# Patient Record
Sex: FEMALE | Race: WHITE | HISPANIC OR LATINO | Employment: FULL TIME | URBAN - METROPOLITAN AREA
[De-identification: names, ages, dates, MRNs, and addresses within clinical notes are randomized per-mention and may not be internally consistent; named-entity substitution may affect disease eponyms.]

---

## 2017-01-05 ENCOUNTER — ALLSCRIPTS OFFICE VISIT (OUTPATIENT)
Dept: OTHER | Facility: OTHER | Age: 37
End: 2017-01-05

## 2017-01-11 ENCOUNTER — ALLSCRIPTS OFFICE VISIT (OUTPATIENT)
Dept: OTHER | Facility: OTHER | Age: 37
End: 2017-01-11

## 2017-03-03 ENCOUNTER — ALLSCRIPTS OFFICE VISIT (OUTPATIENT)
Dept: OTHER | Facility: OTHER | Age: 37
End: 2017-03-03

## 2017-06-06 ENCOUNTER — ALLSCRIPTS OFFICE VISIT (OUTPATIENT)
Dept: OTHER | Facility: OTHER | Age: 37
End: 2017-06-06

## 2017-09-08 ENCOUNTER — ALLSCRIPTS OFFICE VISIT (OUTPATIENT)
Dept: OTHER | Facility: OTHER | Age: 37
End: 2017-09-08

## 2017-09-12 ENCOUNTER — GENERIC CONVERSION - ENCOUNTER (OUTPATIENT)
Dept: OTHER | Facility: OTHER | Age: 37
End: 2017-09-12

## 2017-11-29 ENCOUNTER — ALLSCRIPTS OFFICE VISIT (OUTPATIENT)
Dept: OTHER | Facility: OTHER | Age: 37
End: 2017-11-29

## 2018-01-09 NOTE — RESULT NOTES
Message  I received a message from the device clinic that opti vol crossed the threshold  No answer I will attempt to call [facility] in the future to evalaute for SS of HF        Signatures   Electronically signed by : Ellen Concepcion; Sep 12 2017  4:57PM EST                       (Author)

## 2018-01-12 VITALS
BODY MASS INDEX: 35.88 KG/M2 | SYSTOLIC BLOOD PRESSURE: 104 MMHG | WEIGHT: 195 LBS | HEIGHT: 62 IN | HEART RATE: 72 BPM | DIASTOLIC BLOOD PRESSURE: 68 MMHG

## 2018-01-13 VITALS
HEIGHT: 62 IN | SYSTOLIC BLOOD PRESSURE: 110 MMHG | BODY MASS INDEX: 35.7 KG/M2 | WEIGHT: 194 LBS | DIASTOLIC BLOOD PRESSURE: 68 MMHG

## 2018-01-19 ENCOUNTER — ALLSCRIPTS OFFICE VISIT (OUTPATIENT)
Dept: OTHER | Facility: OTHER | Age: 38
End: 2018-01-19

## 2018-01-20 NOTE — PROGRESS NOTES
Assessment   Assessed    1  Cardiomyopathy (425 4) (I42 9)   2  ICD (implantable cardioverter-defibrillator) in place (V45 02) (Z95 810)   3  Left bundle-branch block (426 3) (I44 7)   4  Hypertension (401 9) (I10)    Plan   Hypertension    · Follow-up visit in 1 year Evaluation and Treatment  Follow-up  Status: Hold For -    Scheduling  Requested for: 63BAG0962   Ordered; For: Hypertension; Ordered By: Duane Pippins Performed:  Due: 80FAR7505  Paroxysmal atrial fibrillation    · EKG/ECG- POC; Status:Complete;   Done: 68JTH3784   Perform: In Office; Due:19Jan2019; Last Updated By:Alyssa Camacho; 1/19/2018 3:14:53 PM;Ordered; For:Paroxysmal atrial fibrillation; Ordered By:Jamal Mattson;    Discussion/Summary   Cardiology Discussion Summary Free Text Note Form [facility]:    All of her assessed cardiac problems are stable  I have reviewed her medications and made no changes  No cardiac testing is ordered  RTO 1 year  Repeat an ECHO at that time  Chief Complaint   Chief Complaint Free Text Note Form: patient at the office for 1 year follow up, denied any symptoms only concern about device is making some alert sounds  History of Present Illness   Cardiology HPI Free Text Note Form [facility]: She has not had any cardiac problems since her last OV  He lost over 50 LBS and is doing great  She denies CP, SOB, palpitations, LE edema  She has a CM and LBBB thought to be a postpartum CM 9 years ago  Her EF is now 50% with a biV pacemaker and medications  Review of Systems   Cardiology Female ROS:         Cardiac: as noted in HPI  Skin: No complaints of nonhealing sores or skin rash  Genitourinary: No complaints of recurrent urinary tract infections, frequent urination at night, difficult urination, blood in urine, kidney stones, loss of bladder control, kidney problems, denies any birth control or hormone replacement, is not post menopausal, not currently pregnant        Psychological: No complaints of feeling depressed, anxiety, panic attacks, or difficulty concentrating  General: No complaints of trouble sleeping, lack of energy, fatigue, appetite changes, weight changes, fever, frequent infections, or night sweats  Respiratory: No complaints of shortness of breath, cough with sputum, or wheezing  HEENT: No complaints of serious problems, hearing problems, nose problems, throat problems, or snoring  Gastrointestinal: No complaints of liver problems, nausea, vomiting, heartburn, constipation, bloody stools, diarrhea, problems swallowing, adbominal pain, or rectal bleeding  Hematologic: No complaints of bleeding disorders, anemia, blood clots, or excessive brusing  Neurological: No complaints of numbness, tingling, dizziness, weakness, seizures, headaches, syncope or fainting, AM fatigue, daytime sleepiness, no witnessed apnea episodes  Musculoskeletal: No complaints of arthritis, back pain, or painfull swelling  ROS Reviewed:    ROS reviewed  Active Problems   Problems    1  Acute bronchitis (466 0) (J20 9)   2  Acute rhinitis (460) (J00)   3  Acute sinusitis (461 9) (J01 90)   4  Anxiety (300 00) (F41 9)   5  Cardiomyopathy (425 4) (I42 9)   6  Chronic systolic congestive heart failure (428 22,428 0) (I50 22)   7  Encounter for gynecological examination without abnormal finding (V72 31) (Z01 419)   8  Encounter for routine gynecological examination (V72 31) (Z01 419)   9  Encounter for routine gynecological examination with Papanicolaou smear of cervix     (V72 31,V76 2) (Z01 419)   10  Hypertension (401 9) (I10)   11  ICD (implantable cardioverter-defibrillator) in place (V45 02) (Z95 810)   12  Left bundle-branch block (426 3) (I44 7)   13  Menorrhagia with regular cycle (626 2) (N92 0)   14  Mitral regurgitation (424 0) (I34 0)   15  Paroxysmal atrial fibrillation (427 31) (I48 0)   16  Preoperative testing (V72 84) (Z01 818)   17   Screening for human papillomavirus (HPV) (V73 81) (Z11 51)    Past Medical History   Problems    1  History of Bunion, unspecified laterality   2  History of candidal vulvovaginitis (V13 29) (Z86 19)   3  History of chest pain (V13 89) (Z87 898)   4  History of herpes simplex infection (V12 09) (Z86 19)   5  History of left bundle branch block (V12 59) (Z86 79)   6  Normal delivery 21   9)  Active Problems And Past Medical History Reviewed: The active problems and past medical history were reviewed and updated today  Surgical History   Problems    1  History of Defibrillation   2  History of Implantable Cardioverter-Defibrillator   3  History of Tubal Ligation  Surgical History Reviewed: The surgical history was reviewed and updated today  Family History   Father    1  Family history of Diabetes Mellitus (V18 0)   2  Family history of Heart Disease (V17 49)   3  Family history of Hyperlipidemia   4  Family history of Hypertension (V17 49)  Family History Reviewed: The family history was reviewed and updated today  Social History   Problems    · Being A Social Drinker   · Daily Coffee Consumption (2  Cups/Day)   · Exercising regularly (more than 90 min/week)   · Marital History - Currently    · Never A Smoker  Social History Reviewed: The social history was reviewed and updated today  The social history was reviewed and is unchanged  Current Meds    1  Carvedilol 25 MG Oral Tablet; Take 1 tablet twice daily; Therapy: 43VAD3820 to ([facility])  Requested for: 69XRZ1800; Last     Rx:28Nov2017 Ordered   2  Lisinopril 5 MG Oral Tablet; Take 1 tablet twice daily; Therapy: 95QWH4591 to ([facility])  Requested for: 17KHN3653; Last     Rx:28Nov2017 Ordered  Medication List Reviewed: The medication list was reviewed and updated today  Allergies   Medication    1   No Known Drug Allergies    Vitals   Vital Signs    Recorded: 23GKX1146 03:05PM   Heart Rate 69   Systolic 126, RUE, Sitting   Diastolic 70, RUE, Sitting   Height 5 ft 1 5 in   Weight 148 lb 3 oz   BMI Calculated 27 55   BSA Calculated 1 67     Physical Exam        Constitutional - General appearance: No acute distress, well appearing and well nourished  Eyes - Conjunctiva and Sclera examination: Conjunctiva pink, sclera anicteric  Neck - Normal, no JVD   Pulmonary - Respiratory effort: No signs of respiratory distress  -- Auscultation of lungs: Clear to auscultation  Cardiovascular - Auscultation of heart: Normal rate and rhythm, normal S1 and S2, no murmurs  -- Pedal pulses: Normal, 2+ bilaterally  -- Examination of extremities for edema and/or varicosities: Normal        Abdomen - Soft  Musculoskeletal - Gait and station: Normal gait  Skin - Skin: Normal without rashes  Skin is warm and well perfused  Neurologic - Speech normal  No focal deficits  Psychiatric - Orientation to person, place, and time: Normal       Results/Data   ECG Report:      Rhythm and rate:  normal sinus rhythm  BiV pacing      Future Appointments      Date/Time Provider Specialty Site   12/04/2018 08:30 AM Cardiology, [address] [facility]   03/06/2018 09:30 AM Cardiology, Device Remote  [address]   02/01/2018 09:40 AM GIULIANO Morejon   Obstetrics/Gynecology [facility] OB     Signatures    Electronically signed by : GIULIANO Mitchell ; Jan 19 2018  3:27PM EST                       (Author)

## 2018-01-23 VITALS
BODY MASS INDEX: 27.27 KG/M2 | WEIGHT: 148.19 LBS | HEART RATE: 69 BPM | DIASTOLIC BLOOD PRESSURE: 70 MMHG | SYSTOLIC BLOOD PRESSURE: 100 MMHG | HEIGHT: 62 IN

## 2018-02-01 ENCOUNTER — OFFICE VISIT (OUTPATIENT)
Dept: OBGYN CLINIC | Facility: CLINIC | Age: 38
End: 2018-02-01
Payer: COMMERCIAL

## 2018-02-01 VITALS
DIASTOLIC BLOOD PRESSURE: 62 MMHG | BODY MASS INDEX: 27.64 KG/M2 | SYSTOLIC BLOOD PRESSURE: 122 MMHG | HEIGHT: 61 IN | WEIGHT: 146.4 LBS

## 2018-02-01 DIAGNOSIS — Z12.31 ENCOUNTER FOR SCREENING MAMMOGRAM FOR MALIGNANT NEOPLASM OF BREAST: ICD-10-CM

## 2018-02-01 DIAGNOSIS — Z01.419 ENCOUNTER FOR GYNECOLOGICAL EXAMINATION (GENERAL) (ROUTINE) WITHOUT ABNORMAL FINDINGS: Primary | ICD-10-CM

## 2018-02-01 PROCEDURE — 87624 HPV HI-RISK TYP POOLED RSLT: CPT | Performed by: OBSTETRICS & GYNECOLOGY

## 2018-02-01 PROCEDURE — G0145 SCR C/V CYTO,THINLAYER,RESCR: HCPCS | Performed by: OBSTETRICS & GYNECOLOGY

## 2018-02-01 PROCEDURE — S0612 ANNUAL GYNECOLOGICAL EXAMINA: HCPCS | Performed by: OBSTETRICS & GYNECOLOGY

## 2018-02-01 RX ORDER — LISINOPRIL 5 MG/1
TABLET ORAL
COMMUNITY
Start: 2017-11-28 | End: 2018-09-10

## 2018-02-01 RX ORDER — LISINOPRIL 5 MG/1
1 TABLET ORAL 2 TIMES DAILY
COMMUNITY
Start: 2011-03-23 | End: 2018-12-01 | Stop reason: SDUPTHER

## 2018-02-01 RX ORDER — CARVEDILOL 25 MG/1
25 TABLET ORAL 2 TIMES DAILY WITH MEALS
COMMUNITY
Start: 2017-11-28 | End: 2018-12-01 | Stop reason: SDUPTHER

## 2018-02-01 NOTE — PROGRESS NOTES
Pepper Hinojosa is a 42 y o  female here for a routine gyn exam,   Current complaints   No     Patient is here for annual gyn exam   She has no new problems  She lost 50 lb she should go back on weight watchers program  She is doing well with her pacemaker/defibrillator  Her menses and cycles are normal       Obstetric History  OB History   No data available       Social History     Social History    Marital status: /Civil Union     Spouse name: N/A    Number of children: N/A    Years of education: N/A     Occupational History    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      Social History Main Topics    Smoking status: Never Smoker    Smokeless tobacco: Never Used    Alcohol use Yes      Comment: social    Drug use: No    Sexual activity: Yes     Partners: Male     Birth control/ protection: Female Sterilization     Other Topics Concern    Not on file     Social History Narrative    No narrative on file     Family History   Problem Relation Age of Onset    Diabetes Father     Hypertension Father     Heart disease Father     Hyperlipidemia Father      Past Medical History:   Diagnosis Date    Bunion 1985    Candidal vulvovaginitis     Chest pain     Herpes simplex     LBBB (left bundle branch block)     Normal delivery     2005 son, 2008 daughter    Pacemaker        Current Outpatient Prescriptions:     carvedilol (COREG) 25 mg tablet, , Disp: , Rfl:     lisinopril (ZESTRIL) 5 mg tablet, , Disp: , Rfl:             Gynecologic History  Patient's last menstrual period was 01/22/2018 (exact date)  Contraception: tubal ligation  Last Pap: 2015  Results were: normal  Last mammogram: never   Results were: na    Obstetric History  OB History   No data available         The following portions of the patient's history were reviewed and updated as appropriate: allergies, current medications, past family history, past medical history, past social history, past surgical history and problem list     Review of Systems  Review of Systems is unremarkable     Objective     /62   Ht 5' 1" (1 549 m)   Wt 66 4 kg (146 lb 6 4 oz)   LMP 01/22/2018 (Exact Date)   Breastfeeding? No   BMI 27 66 kg/m²     General Appearance:    Alert, cooperative, no distress, appears stated age   Head:    Normocephalic, without obvious abnormality, atraumatic                   Neck:   Supple, symmetrical, trachea midline, no adenopathy;     thyroid:  no enlargement/tenderness/nodules;           Lungs:     Clear to auscultation bilaterally, respirations unlabored        Heart:    Regular rate and rhythm, S1 and S2 normal, no murmur, rub   or gallop   Breast Exam:  normal nipple, no masses normal skin   Abdomen:     Soft, non-tender, bowel sounds active all four quadrants,     no masses, no organomegaly     Genitalia      :Vulva: normal  Vagina: normal mucosa  Cervix: normal appearance and thin prep PAP obtained  Uterus: normal size, anteverted  Adnexa: normal adnexa and no mass, fullness, tenderness     Rectal:   not done   Extremities:   Extremities normal, atraumatic, no cyanosis or edema       Skin:   Skin color, texture, turgor normal, no rashes or lesions   Lymph nodes:   Cervical, supraclavicular, and axillary nodes normal             Assessment:  Encounter Diagnoses   Name Primary?  Encounter for screening mammogram for malignant neoplasm of breast     Encounter for gynecological examination (general) (routine) without abnormal findings Yes     Encounter Diagnoses   Name Primary?  Encounter for screening mammogram for malignant neoplasm of breast     Encounter for gynecological examination (general) (routine) without abnormal findings Yes           Normal gynecological evalation and well visit         Plan     Education reviewed: none

## 2018-02-05 LAB — HPV RRNA GENITAL QL NAA+PROBE: NORMAL

## 2018-02-07 LAB
LAB AP GYN PRIMARY INTERPRETATION: NORMAL
LAB AP LMP: NORMAL
Lab: NORMAL

## 2018-02-16 ENCOUNTER — TRANSCRIBE ORDERS (OUTPATIENT)
Dept: LAB | Facility: CLINIC | Age: 38
End: 2018-02-16

## 2018-02-19 ENCOUNTER — HOSPITAL ENCOUNTER (OUTPATIENT)
Dept: RADIOLOGY | Age: 38
Discharge: HOME | End: 2018-02-19
Payer: COMMERCIAL

## 2018-02-19 DIAGNOSIS — Z12.31 ENCOUNTER FOR SCREENING MAMMOGRAM FOR MALIGNANT NEOPLASM OF BREAST: ICD-10-CM

## 2018-02-19 PROCEDURE — 77067 SCR MAMMO BI INCL CAD: CPT

## 2018-03-06 ENCOUNTER — CLINICAL SUPPORT (OUTPATIENT)
Dept: CARDIOLOGY CLINIC | Facility: CLINIC | Age: 38
End: 2018-03-06
Payer: COMMERCIAL

## 2018-03-06 DIAGNOSIS — I44.7 LEFT BUNDLE BRANCH BLOCK: ICD-10-CM

## 2018-03-06 DIAGNOSIS — I42.9 CARDIOMYOPATHY, UNSPECIFIED TYPE (HCC): ICD-10-CM

## 2018-03-06 DIAGNOSIS — Z95.810 AICD (AUTOMATIC CARDIOVERTER/DEFIBRILLATOR) PRESENT: ICD-10-CM

## 2018-03-06 DIAGNOSIS — I50.22 CHRONIC SYSTOLIC CONGESTIVE HEART FAILURE (HCC): Primary | ICD-10-CM

## 2018-03-06 DIAGNOSIS — I47.2 VENTRICULAR TACHYARRHYTHMIA (HCC): ICD-10-CM

## 2018-03-06 PROCEDURE — 93295 DEV INTERROG REMOTE 1/2/MLT: CPT | Performed by: INTERNAL MEDICINE

## 2018-03-06 PROCEDURE — 93296 REM INTERROG EVL PM/IDS: CPT | Performed by: INTERNAL MEDICINE

## 2018-03-06 NOTE — PROGRESS NOTES
ICD CARELINK TRANSMISSION: BATTERY VOLTAGE ADEQUATE (5 9 YRS)  AP<0 1%, BVP>99%  ALL AVAILABLE LEAD PARAMETERS WITHIN NORMAL LIMITS  NO SIGNIFICANT HIGH RATE EPISODES  [address] [facility]  OPTI-VOL FLUID THRESHOLD CROSSED IN DEC & ONGOING  PT IS NOT ON ANY DIURETIC  WILL RECHECK IN 1 MONTH  TASKED NP  NORMAL DEVICE FUNCTION   GV

## 2018-03-07 NOTE — PROGRESS NOTES
"  Discussion/Summary  Normal device function      Results/Data  Results   Cardiac Device Remote [id number] 01:25PM [facility] Lung     Test Name Result Flag Reference   MISCELLANEOUS COMMENT      CARELINK TRANSMISSION: BATTERY VOLTAGE ADEQUATE (7 7 YRS)  AP<0 1%, BVP>99%  NO SIGNIFICANT HIGH RATE EPISODES  OPTI-VOL WITHIN NORMAL LIMITS  ALL AVAILABLE LEAD PARAMETERS WITHIN NORMAL LIMITS  NORMAL DEVICE FUNCTION  GV   Cardiac Electrophysiology Report      [id number]}  pdf   DEVICE TYPE CRT-D       Cardiac Electrophysiology Report [id number] 01:25PM [facility]     Test Name Result Flag Reference   Cardiac Electrophysiology Report      [id number]  pdf     Signatures   Electronically signed by : Francisco Gallardo RN; Feb 29 2016  1:07PM EST                       (Author)    Electronically signed by : Chikis Moses DO; Mar  8 2016  1:04PM EST                       (Author)    "

## 2018-03-07 NOTE — PROGRESS NOTES
"  Discussion/Summary  Normal device function     6 hours paf  kelby notified  Results/Data  Results   Cardiac Device Remote [id number] 12:33AM Luz Maria Alfaro     Test Name Result Flag Reference   MISCELLANEOUS COMMENT (Report)     CARELINK TRANSMISSION: BATTERY VOLTAGE ADEQUATE  (7 5 YRS)  AP 1%  98%  ALL AVAILABLE LEAD PARAMETERS WITHIN NORMAL LIMITS  1 AT/AF EPISODE DETECTED < 7 HOURS  PATIENT IS NOT ON ASA OR ANTICOAGULANTS  DR Patricia Deras  OPTI-VOL WITHIN NORMAL LIMITS  NORMAL DEVICE FUNCTION  ---HEIN   Cardiac Electrophysiology Report      [id number]}  pdf   DEVICE TYPE CRT-D       Cardiac Electrophysiology Report [id number] 12:33AM Luz Maria Alfaro     Test Name Result Flag Reference   Cardiac Electrophysiology Report      [id number]  pdf     Signatures   Electronically signed by : Idalmis Vivar, ; Son  3 2016  3:39PM EST                       (Author)    Electronically signed by : Ivan Reinoso DO; Jun 4 2016  3:37PM EST                       (Author)    "

## 2018-03-07 NOTE — PROGRESS NOTES
"  Discussion/Summary  Normal device function     Brief NSVT  EF retained  on beta blocker  adequate BiV pacing    continue same therapy     Results/Data  Cardiac Device In Clinic [id number] 03:15PM Donnia Osgood     Test Name Result Flag Reference   MISCELLANEOUS COMMENT (Report)     DEVICE INTERROGATED IN THE [place] OFFICE: BATTERY VOLTAGE ADEQUATE  AP 0% BVP 99 8% ( 98 5 VSR PACE 1 3)  1 NSVT NOTED, NO THERAPIES GIVEN  AVAIL EGRAM 1 SECS; APPROX 13 BEATS ~ 198 BPM  PT ON CARVEDILOL & EF 50% (2016)  OPTI-VOL WITHIN NORMAL LIMITS  ALL LEAD PARAMETERS WITHIN NORMAL LIMITS  NO PROGRAMMING CHANGES MADE TO DEVICE PARAMETERS  NORMAL DEVICE FUNCTION  NC   Cardiac Electrophysiology Report      [id number]  pdf   DEVICE TYPE CRT-D       Cardiac Electrophysiology Report [id number] 03:15PM Donnia Osgood     Test Name Result Flag Reference   Cardiac Electrophysiology Report      [id number] pdf     Signatures   Electronically signed by : Aletha Gresham, ; Nov 28 2016 10:27AM EST                       (Author)    Electronically signed by : GIULIANO Joya ; Nov 28 2016 12:52PM EST                       (Author)    "

## 2018-03-07 NOTE — PROGRESS NOTES
"  Discussion/Summary  Normal device function      Results/Data  Cardiac Device Remote [id number] 11:22AM [employer] [facility]     Test Name Result Flag Reference   MISCELLANEOUS COMMENT (Report)     CARELINK TRANSMISSION: BATTERY VOLTAGE ADEQUATE (6 YR)  AP < 0 1% BP 99 8% ( 98 4%+VSRP 1 4%)  ALL AVAILABLE LEAD PARAMETERS WITHIN NORMAL LIMITS  NO SIGNIFICANT HIGH RATE EPISODES  204 V SENSE EPISODES SINCE 6/5/17 (1 5 MIN /D) WITH MARKERS SHOWING STACH  -136 BPM (VSRP MAX = 130 PPM)  OPTI-VOL FLUID THRESHOLD CROSSED ~ 9/1/2017  TASKED TO NP  NORMAL DEVICE FUNCTION  RG   Cardiac Electrophysiology Report      [id number]  pdf   DEVICE TYPE CRT-D       Cardiac Electrophysiology Report [id number] 11:22AM [facility] [employer] [facility]     Test Name Result Flag Reference   Cardiac Electrophysiology Report      [id number]  pdf     Signatures   Electronically signed by : Jarret Ray, ; Sep  8 2017  8:05AM EST                       (Author)    Electronically signed by : GIULIANO Hicks ; Sep  8 2017  9:40AM EST                       (Author)    "

## 2018-03-07 NOTE — PROGRESS NOTES
"  Discussion/Summary  Normal device function     Adequate BiV pacing  sinus tachycardia  Results/Data  Cardiac Device Remote [id number] 10:40AM Sarah Beth Figueroa     Test Name Result Flag Reference   MISCELLANEOUS COMMENT (Report)     CARELINK TRANSMISSION: BATTERY VOLTAGE ADEQUATE (7 2 YRS)  AP<0 1%, BVP>99% (TOTAL -98 5% + VSR PACE-1 3%)  NO SIGNIFICANT HIGH RATE EPISODES  [place]  OPTI-VOL WITHIN NORMAL LIMITS  ALL AVAILABLE LEAD PARAMETERS WITHIN NORMAL LIMITS  NORMAL DEVICE FUNCTION  GV   Cardiac Electrophysiology Report      [id number]  pdf   DEVICE TYPE CRT-D       Cardiac Electrophysiology Report [id number] 10:40AM Sarah Beth Figueroa     Test Name Result Flag Reference   Cardiac Electrophysiology Report      [id number]  pdf     Signatures   Electronically signed by : Jose Santizo RN; Mar  3 2017 12:49PM EST                       (Author)    Electronically signed by : GIULIANO Elias ; Mar 14 2017 10:48AM EST                       (Author)    "

## 2018-03-07 NOTE — PROGRESS NOTES
"  Discussion/Summary  Normal device function      Results/Data  Cardiac Device In Clinic [id number] 01:27PM Suzanna Estrin     Test Name Result Flag Reference   MISCELLANEOUS COMMENT (Report)     DEVICE INTERROGATED IN THE [facility] OFFICE  [id number] VOLTAGE ADEQUATE  (6 1 YRS)  AP <1% BVP 99%  ALL AVAILABLE LEAD PARAMETERS WITHIN NORMAL LIMITS  NO SIGNIFICANT HIGH RATE EPISODES  OPTI-VOL WITHIN NORMAL LIMITS  NO PROGRAMMING CHANGES MADE TO DEVICE PARAMETERS  NORMAL DEVICE FUNCTION  ---HEIN   Cardiac Electrophysiology Report      [id number] [id number]  pdf   DEVICE TYPE CRT-D       Cardiac Electrophysiology Report [id number] 01:27PM Suzanna Estrin     Test Name Result Flag Reference   Cardiac Electrophysiology Report      [id number]  pdf     Signatures   Electronically signed by : Jose Velazquez, ; Nov 29 2017  3:18PM EST                       (Author)    Electronically signed by : GIULIANO Jean Baptiste ; Nov 29 2017  4:21PM EST                       (Author)    "

## 2018-03-07 NOTE — PROGRESS NOTES
"  Discussion/Summary  Normal device function      Results/Data  Cardiac Device Remote [id number] 11:25PM Cam Schwalbe     Test Name Result Flag Reference   MISCELLANEOUS COMMENT (Report)     CARELINK TRANSMISSION: BATTERY STATUS "OK"  AP 0% BVP 99 7% ( 98 4 VSR PACE 1 3)  ALL AVAILABLE LEAD PARAMETERS WITHIN NORMAL LIMITS  1 AT/AF NOTED, 0 2% OF TIME IN  4:44 HRS LONG  NO AC NOTED-HER CHADS-VASC SCORE IS 0  DR ALEXIS MADE AWARE  PT DOES HAVE DX OF PAF  EF 50% (2016) & PT ON CARVEDILOL  [address] OPTI-VOL WITHIN NORMAL LIMITS  NORMAL DEVICE FUNCTION  NC   Cardiac Electrophysiology Report      [id number]  pdf   DEVICE TYPE CRT-D       Cardiac Electrophysiology Report [id number] 11:25PM Cam Schwalbe     Test Name Result Flag Reference   Cardiac Electrophysiology Report      [id number]  pdf     Signatures   Electronically signed by : Jefe Mensah, ; Jun 12 2017 12:57PM EST                       (Author)    Electronically signed by : GIULIANO Monahan ; Jun 12 2017  3:25PM EST                       (Author)    "

## 2018-04-09 ENCOUNTER — CLINICAL SUPPORT (OUTPATIENT)
Dept: CARDIOLOGY CLINIC | Facility: CLINIC | Age: 38
End: 2018-04-09
Payer: COMMERCIAL

## 2018-04-09 DIAGNOSIS — I50.22 CHRONIC SYSTOLIC CONGESTIVE HEART FAILURE (HCC): Primary | ICD-10-CM

## 2018-04-09 DIAGNOSIS — I47.2 VT (VENTRICULAR TACHYCARDIA) (HCC): ICD-10-CM

## 2018-04-09 DIAGNOSIS — Z95.810 PRESENCE OF IMPLANTABLE CARDIOVERTER-DEFIBRILLATOR (ICD): ICD-10-CM

## 2018-04-09 PROCEDURE — 93299 PR REM INTERROG ICPMS/SCRMS <30 D TECH REVIEW: CPT | Performed by: INTERNAL MEDICINE

## 2018-04-09 PROCEDURE — 93297 REM INTERROG DEV EVAL ICPMS: CPT | Performed by: INTERNAL MEDICINE

## 2018-04-09 NOTE — PROGRESS NOTES
CARELINK CRT-D TRANSMISSION - OPTI-VOL ONLY:  OPTI-VOL WITHIN NORMAL LIMITS   BATTERY VOLTAGE ADEQUATE (4 7 YR)   AP <0 1% BP 98 4% (VSRP 1 4%)   ALL LEAD PARAMETERS WITHIN NORMAL LIMITS   NO SIGNIFICANT HIGH RATE EPISODES   103 V SENSE EPISODES FOR ST > VSRP MAX (130 BPM)   NORMAL DEVICE FUNCTION   RG

## 2018-09-10 ENCOUNTER — HOSPITAL ENCOUNTER (EMERGENCY)
Facility: HOSPITAL | Age: 38
Discharge: HOME | End: 2018-09-10
Attending: EMERGENCY MEDICINE
Payer: COMMERCIAL

## 2018-09-10 ENCOUNTER — APPOINTMENT (EMERGENCY)
Dept: RADIOLOGY | Facility: HOSPITAL | Age: 38
End: 2018-09-10
Payer: COMMERCIAL

## 2018-09-10 VITALS
DIASTOLIC BLOOD PRESSURE: 74 MMHG | OXYGEN SATURATION: 100 % | SYSTOLIC BLOOD PRESSURE: 120 MMHG | WEIGHT: 140.43 LBS | BODY MASS INDEX: 26.53 KG/M2 | RESPIRATION RATE: 18 BRPM | TEMPERATURE: 98.7 F | HEART RATE: 67 BPM

## 2018-09-10 DIAGNOSIS — R07.89 CHEST WALL PAIN: Primary | ICD-10-CM

## 2018-09-10 DIAGNOSIS — M79.10 MUSCLE PAIN: ICD-10-CM

## 2018-09-10 LAB
ALBUMIN SERPL BCP-MCNC: 3.7 G/DL (ref 3.5–5)
ALP SERPL-CCNC: 45 U/L (ref 46–116)
ALT SERPL W P-5'-P-CCNC: 27 U/L (ref 12–78)
ANION GAP SERPL CALCULATED.3IONS-SCNC: 7 MMOL/L (ref 4–13)
AST SERPL W P-5'-P-CCNC: 14 U/L (ref 5–45)
ATRIAL RATE: 63 BPM
BASOPHILS # BLD AUTO: 0.03 THOUSANDS/ΜL (ref 0–0.1)
BASOPHILS NFR BLD AUTO: 1 % (ref 0–1)
BILIRUB SERPL-MCNC: 0.2 MG/DL (ref 0.2–1)
BUN SERPL-MCNC: 12 MG/DL (ref 5–25)
CALCIUM SERPL-MCNC: 9 MG/DL (ref 8.3–10.1)
CHLORIDE SERPL-SCNC: 103 MMOL/L (ref 100–108)
CO2 SERPL-SCNC: 26 MMOL/L (ref 21–32)
CREAT SERPL-MCNC: 0.61 MG/DL (ref 0.6–1.3)
DEPRECATED D DIMER PPP: 349 NG/ML (FEU) (ref 0–424)
EOSINOPHIL # BLD AUTO: 0.22 THOUSAND/ΜL (ref 0–0.61)
EOSINOPHIL NFR BLD AUTO: 4 % (ref 0–6)
ERYTHROCYTE [DISTWIDTH] IN BLOOD BY AUTOMATED COUNT: 12.2 % (ref 11.6–15.1)
GFR SERPL CREATININE-BSD FRML MDRD: 114 ML/MIN/1.73SQ M
GLUCOSE SERPL-MCNC: 88 MG/DL (ref 65–140)
HCT VFR BLD AUTO: 35.8 % (ref 34.8–46.1)
HGB BLD-MCNC: 11.7 G/DL (ref 11.5–15.4)
IMM GRANULOCYTES # BLD AUTO: 0.01 THOUSAND/UL (ref 0–0.2)
IMM GRANULOCYTES NFR BLD AUTO: 0 % (ref 0–2)
LYMPHOCYTES # BLD AUTO: 2.44 THOUSANDS/ΜL (ref 0.6–4.47)
LYMPHOCYTES NFR BLD AUTO: 42 % (ref 14–44)
MCH RBC QN AUTO: 28.4 PG (ref 26.8–34.3)
MCHC RBC AUTO-ENTMCNC: 32.7 G/DL (ref 31.4–37.4)
MCV RBC AUTO: 87 FL (ref 82–98)
MONOCYTES # BLD AUTO: 0.68 THOUSAND/ΜL (ref 0.17–1.22)
MONOCYTES NFR BLD AUTO: 12 % (ref 4–12)
NEUTROPHILS # BLD AUTO: 2.48 THOUSANDS/ΜL (ref 1.85–7.62)
NEUTS SEG NFR BLD AUTO: 41 % (ref 43–75)
NRBC BLD AUTO-RTO: 0 /100 WBCS
P AXIS: -60 DEGREES
PLATELET # BLD AUTO: 202 THOUSANDS/UL (ref 149–390)
PMV BLD AUTO: 10.9 FL (ref 8.9–12.7)
POTASSIUM SERPL-SCNC: 3.9 MMOL/L (ref 3.5–5.3)
PR INTERVAL: 162 MS
PROT SERPL-MCNC: 7.2 G/DL (ref 6.4–8.2)
QRS AXIS: -31 DEGREES
QRSD INTERVAL: 96 MS
QT INTERVAL: 394 MS
QTC INTERVAL: 403 MS
RBC # BLD AUTO: 4.12 MILLION/UL (ref 3.81–5.12)
SODIUM SERPL-SCNC: 136 MMOL/L (ref 136–145)
T WAVE AXIS: 60 DEGREES
TROPONIN I SERPL-MCNC: <0.02 NG/ML
VENTRICULAR RATE: 63 BPM
WBC # BLD AUTO: 5.86 THOUSAND/UL (ref 4.31–10.16)

## 2018-09-10 PROCEDURE — 93010 ELECTROCARDIOGRAM REPORT: CPT | Performed by: INTERNAL MEDICINE

## 2018-09-10 PROCEDURE — 85379 FIBRIN DEGRADATION QUANT: CPT | Performed by: EMERGENCY MEDICINE

## 2018-09-10 PROCEDURE — 80053 COMPREHEN METABOLIC PANEL: CPT | Performed by: EMERGENCY MEDICINE

## 2018-09-10 PROCEDURE — 36415 COLL VENOUS BLD VENIPUNCTURE: CPT

## 2018-09-10 PROCEDURE — 85025 COMPLETE CBC W/AUTO DIFF WBC: CPT | Performed by: EMERGENCY MEDICINE

## 2018-09-10 PROCEDURE — 93005 ELECTROCARDIOGRAM TRACING: CPT

## 2018-09-10 PROCEDURE — 99285 EMERGENCY DEPT VISIT HI MDM: CPT

## 2018-09-10 PROCEDURE — 84484 ASSAY OF TROPONIN QUANT: CPT | Performed by: EMERGENCY MEDICINE

## 2018-09-10 PROCEDURE — 71046 X-RAY EXAM CHEST 2 VIEWS: CPT

## 2018-09-10 NOTE — DISCHARGE INSTRUCTIONS
Chest Wall Pain, Ambulatory Care   GENERAL INFORMATION:   Chest wall pain  may be caused by problems with the muscles, cartilage, or bones of the chest wall  Chest wall pain may also be caused by pain that spreads to your chest from another part of your body  The pain may be aching, severe, dull, or sharp  It may come and go, or it may be constant  The pain may be worse when you move in certain ways, breathe deeply, or cough  Seek immediate care for the following symptoms:   · Severe pain    · You have any of the following signs of a heart attack:      ¨ Squeezing, pressure, or pain in your chest that lasts longer than 5 minutes or returns    ¨ Discomfort or pain in your back, neck, jaw, stomach, or arm     ¨ Trouble breathing    ¨ Nausea or vomiting    ¨ Lightheadedness or a sudden cold sweat, especially with chest pain or trouble breathing  Treatment  depends on the cause of your chest wall pain  You may need any of the following:  · NSAIDs  help decrease swelling and pain or fever  This medicine is available with or without a doctor's order  NSAIDs can cause stomach bleeding or kidney problems in certain people  If you take blood thinner medicine, always ask your healthcare provider if NSAIDs are safe for you  Always read the medicine label and follow directions  · Acetaminophen  decreases pain  It is available without a doctor's order  Ask how much to take and how often to take it  Follow directions  Acetaminophen can cause liver damage if not taken correctly  · Apply heat  on your chest for 20 to 30 minutes every 2 hours for as many days as directed  Heat helps decrease pain and muscle spasms  · Apply ice  on your chest for 15 to 20 minutes every hour or as directed  Use an ice pack, or put crushed ice in a plastic bag  Cover it with a towel  Ice helps prevent tissue damage and decreases swelling and pain    Follow up with your healthcare provider as directed:  Write down your questions so you remember to ask them during your visits  CARE AGREEMENT:   You have the right to help plan your care  Learn about your health condition and how it may be treated  Discuss treatment options with your caregivers to decide what care you want to receive  You always have the right to refuse treatment  The above information is an  only  It is not intended as medical advice for individual conditions or treatments  Talk to your doctor, nurse or pharmacist before following any medical regimen to see if it is safe and effective for you  © 2014 [address] is for End User's use only and may not be sold, redistributed or otherwise used for commercial purposes  All illustrations and images included in CareNotes® are the copyrighted property of A D A M , Inc  or [facility]  Musculoskeletal Pain   WHAT YOU NEED TO KNOW:   Muscle pain can be dull, achy, or sharp  You may have pain and tenderness to the touch as well  It can occur anywhere on your body and is often brought on by exercise  Muscle pain may occur from an injury, such as a sprain, tendonitis, or bone fracture  Muscle pain can also be the result of medical conditions, such as polymyositis, fibromyalgia, and connective tissue disorders  DISCHARGE INSTRUCTIONS:   Self care:   · Rest  as directed and avoid activity that causes pain  You may be able to return to normal activity when you can move without pain  Follow directions for rest and activity  You are at risk for injury for 3 weeks after your symptoms go away  · Ice  your painful muscle area to decrease pain and swelling  Use an ice pack, or put ice in a plastic bag and cover it with a towel  Always  put a cloth between the ice and your skin  Apply the ice as often as directed for the first 24 to 48 hours  · Compression  with a splint, brace, or elastic bandage helps decrease pain and swelling  This may be needed for muscle pain in arms or legs   A splint, brace, or bandage will also help protect the painful area when you move around  · Elevate  a painful arm or leg to reduce swelling and pain  Elevate your limb while you are sitting or lying down  Prop a painful leg on pillows to keep it above the level of your heart  Medicines:   · NSAIDs  help decrease swelling and pain or fever  This medicine is available with or without a doctor's order  NSAIDs can cause stomach bleeding or kidney problems in certain people  If you take blood thinner medicine, always ask your healthcare provider if NSAIDs are safe for you  Always read the medicine label and follow directions  · Acetaminophen  is used to decrease pain  It is available without a doctor's order  Ask your healthcare provider how much to take and when to take it  Follow directions  Acetaminophen can cause liver damage if not taken correctly  Do not take more than one medicine that contains acetaminophen unless directed  · Muscle relaxers  help relax your muscles to decrease pain and muscle spasms  · Steroids  may be given to decrease redness, pain, and swelling  · Take your medicine as directed  Contact your healthcare provider if you think your medicine is not helping or if you have side effects  Tell him if you are allergic to any medicine  Keep a list of the medicines, vitamins, and herbs you take  Include the amounts, and when and why you take them  Bring the list or the pill bottles to follow-up visits  Carry your medicine list with you in case of an emergency  Follow up with your healthcare provider as directed: You may need more tests to help healthcare providers find the cause of your muscle pain  You may need physical therapy to learn muscle strengthening exercises  Write down your questions so you remember to ask them during your visits  Contact your healthcare provider if:   · You have a fever  · You have trouble sleeping because of your pain       · Your painful area becomes more tender, red, and warm to the touch  · You have decreased movement of the painful area  · You have questions or concerns about your condition or care  Return to the emergency department if:   · You have increased severe pain when you move the painful muscle area  · You lose feeling in your painful muscle area  · You have new or worse swelling in the painful area  Your skin may feel tight  · You have increased muscle pain or pain that does not improve with treatment  © 2017 [address]  Information is for End User's use only and may not be sold, redistributed or otherwise used for commercial purposes  All illustrations and images included in CareNotes® are the copyrighted property of A D A M , Inc  or [employer]  The above information is an  only  It is not intended as medical advice for individual conditions or treatments  Talk to your doctor, nurse or pharmacist before following any medical regimen to see if it is safe and effective for you

## 2018-09-10 NOTE — ED PROVIDER NOTES
History  Chief Complaint   Patient presents with    Chest Pain     Pt reports right sided chest pain for the last "2-3 days" that "comes and goes " Pt reports the chest pain worsens when she exhales  Pt denies SOB or any radiating pain  Pt has ICD placed      Patient presents to the emergency department with right sided chest pain that began 2 days ago  She states it hurts most with positional change and with exhalation  She denies trauma fall or new activity or injury  She denies back pain component  She denies sob or diaphoresis  She denies left-sided chest pain  She denies jaw arm neck pain  She denies calf pain calf tenderness or history of DVTs or pulmonary embolisms  She denies fever chills or cough  She refuses analgesics at this time  Prior to Admission Medications   Prescriptions Last Dose Informant Patient Reported? Taking?   carvedilol (COREG) 25 mg tablet  Self Yes Yes   lisinopril (ZESTRIL) 5 mg tablet   Yes Yes   Sig: Take 1 tablet by mouth 2 (two) times a day      Facility-Administered Medications: None       Past Medical History:   Diagnosis Date    Bunion 1985    Candidal vulvovaginitis     Cardiomyopathy ([facility] [address] )     Chest pain     Herpes simplex     LBBB (left bundle branch block)     Normal delivery     2005 son, 2008 daughter    Pacemaker        Past Surgical History:   Procedure Laterality Date    CARDIAC DEFIBRILLATOR PLACEMENT      TUBAL LIGATION  2009       Family History   Problem Relation Age of Onset    Diabetes Father     Hypertension Father     Heart disease Father     Hyperlipidemia Father      I have reviewed and agree with the history as documented  Social History   Substance Use Topics    Smoking status: Never Smoker    Smokeless tobacco: Never Used    Alcohol use Yes      Comment: social        Review of Systems   Constitutional: Negative  Negative for activity change, appetite change, chills, diaphoresis, fatigue and fever     HENT: Negative for congestion, drooling, sinus pain, sinus pressure, trouble swallowing and voice change  Eyes: Negative  Negative for photophobia and visual disturbance  Respiratory: Negative for cough, choking, chest tightness, shortness of breath, wheezing and stridor  Cardiovascular: Positive for chest pain  Negative for palpitations and leg swelling  Gastrointestinal: Negative  Negative for abdominal pain, anal bleeding, blood in stool, diarrhea, nausea and vomiting  Endocrine: Negative  Genitourinary: Negative  Negative for difficulty urinating, dysuria, frequency, hematuria and urgency  Musculoskeletal: Negative  Negative for back pain, joint swelling, neck pain and neck stiffness  Skin: Negative  Negative for rash and wound  Allergic/Immunologic: Negative  Neurological: Negative  Negative for dizziness, tremors, seizures, syncope, facial asymmetry, speech difficulty, light-headedness, numbness and headaches  Hematological: Negative  Does not bruise/bleed easily  Psychiatric/Behavioral: Negative  Physical Exam  Physical Exam   Constitutional: She is oriented to person, place, and time  She appears well-developed and well-nourished  Nontoxic appearance  No respiratory distress  Patient looks comfortable sitting upright in the stretcher  Speak in full sentences and answer simple questions appropriately  HENT:   Head: Normocephalic and atraumatic  Right Ear: External ear normal    Left Ear: External ear normal    Mouth/Throat: Oropharynx is clear and moist    Eyes: Conjunctivae and EOM are normal  Pupils are equal, round, and reactive to light  Neck: Normal range of motion  Neck supple  Cardiovascular: Normal rate, regular rhythm, normal heart sounds and intact distal pulses  Pulmonary/Chest: Effort normal and breath sounds normal  No respiratory distress  She has no wheezes  She has no rales  She exhibits tenderness  Normal visible appearance of anterior chest wall  No asymmetry ecchymosis signs of infection  No crepitus or subcutaneous air  Mild right-sided tenderness to palpation  Increased discomfort with twisting of trunk  Abdominal: Soft  Bowel sounds are normal  She exhibits no distension and no mass  There is no tenderness  There is no rebound and no guarding  No hernia  Musculoskeletal: Normal range of motion  She exhibits no edema, tenderness or deformity  Neurological: She is alert and oriented to person, place, and time  She has normal reflexes  She displays normal reflexes  No cranial nerve deficit or sensory deficit  She exhibits normal muscle tone  Coordination normal    Skin: Skin is warm and dry  No rash noted  No erythema  Psychiatric: She has a normal mood and affect  Her behavior is normal  Judgment and thought content normal    Nursing note and vitals reviewed        Vital Signs  ED Triage Vitals   Temperature Pulse Respirations Blood Pressure SpO2   09/10/18 1604 09/10/18 1605 09/10/18 1604 09/10/18 1604 09/10/18 1605   98 7 °F (37 1 °C) 75 18 132/67 97 %      Temp Source Heart Rate Source Patient Position - Orthostatic VS BP Location FiO2 (%)   09/10/18 1604 09/10/18 1604 09/10/18 1604 09/10/18 1604 --   Oral Monitor Sitting Right arm       Pain Score       --                  Vitals:    09/10/18 1604 09/10/18 1605   BP: 132/67    Pulse:  75   Patient Position - Orthostatic VS: Sitting        Visual Acuity      ED Medications  Medications - No data to display    Diagnostic Studies  Results Reviewed     Procedure Component Value Units Date/Time    D-Dimer [[id number]]  (Normal) Collected:  09/10/18 1652    Lab Status:  Final result Specimen:  Blood Updated:  09/10/18 1715     D-Dimer, Quant 349 ng/ml (FEU)     Troponin I [[id number]]  (Normal) Collected:  09/10/18 1624    Lab Status:  Final result Specimen:  Blood from Arm, Right Updated:  09/10/18 1649     Troponin I <0 02 ng/mL     Comprehensive metabolic panel [[id number]]  (Abnormal) Collected: 09/10/18 1624    Lab Status:  Final result Specimen:  Blood from Arm, Right Updated:  09/10/18 1646     Sodium 136 mmol/L      Potassium 3 9 mmol/L      Chloride 103 mmol/L      CO2 26 mmol/L      ANION GAP 7 mmol/L      BUN 12 mg/dL      Creatinine 0 61 mg/dL      Glucose 88 mg/dL      Calcium 9 0 mg/dL      AST 14 U/L      ALT 27 U/L      Alkaline Phosphatase 45 (L) U/L      Total Protein 7 2 g/dL      Albumin 3 7 g/dL      Total Bilirubin 0 20 mg/dL      eGFR 114 ml/min/1 73sq m     Narrative:         National Kidney Disease Education Program recommendations are as follows:  GFR calculation is accurate only with a steady state creatinine  Chronic Kidney disease less than 60 ml/min/1 73 sq  meters  Kidney failure less than 15 ml/min/1 73 sq  meters  CBC and differential [[id number]]  (Abnormal) Collected:  09/10/18 1624    Lab Status:  Final result Specimen:  Blood from Arm, Right Updated:  09/10/18 1631     WBC 5 86 Thousand/uL      RBC 4 12 Million/uL      Hemoglobin 11 7 g/dL      Hematocrit 35 8 %      MCV 87 fL      MCH 28 4 pg      MCHC 32 7 g/dL      RDW 12 2 %      MPV 10 9 fL      Platelets 311 Thousands/uL      nRBC 0 /100 WBCs      Neutrophils Relative 41 (L) %      Immat GRANS % 0 %      Lymphocytes Relative 42 %      Monocytes Relative 12 %      Eosinophils Relative 4 %      Basophils Relative 1 %      Neutrophils Absolute 2 48 Thousands/µL      Immature Grans Absolute 0 01 Thousand/uL      Lymphocytes Absolute 2 44 Thousands/µL      Monocytes Absolute 0 68 Thousand/µL      Eosinophils Absolute 0 22 Thousand/µL      Basophils Absolute 0 03 Thousands/µL                  XR chest 2 views   ED Interpretation by Annia Quintero MD (09/10 1713)   No acute disease  No infiltrates or evidence of pneumothorax                   Procedures  ECG 12 Lead Documentation  Date/Time: 9/10/2018 4:46 PM  Performed by: Toy Saavedra  Authorized by: Toy Saavedra     ECG reviewed by me, the ED Provider: yes Patient location:  ED  Previous ECG:     Previous ECG:  Compared to current    Similarity:  Changes noted  Interpretation:     Interpretation: abnormal    Rate:     ECG rate:  63    ECG rate assessment: normal    Rhythm:     Rhythm: paced    Pacing:     Capture:  Complete    Type of pacing:  Atrial  QRS:     QRS axis:  Left    QRS intervals: Wide  Conduction:     Conduction: normal    ST segments:     ST segments:  Non-specific  T waves:     T waves: non-specific             Phone Contacts  ED Phone Contact    ED Course  ED Course as of Sep 10 1736   Mon Sep 10, 2018   1728 Patient is stable for discharge  I suspect a muscular etiology of her chest wall pain  Discussed pain management and signs and symptoms that would require return to the emergency department  MDM  CritCare Time    Disposition  Final diagnoses:   Chest wall pain   Muscle pain     Time reflects when diagnosis was documented in both MDM as applicable and the Disposition within this note     Time User Action Codes Description Comment    9/10/2018  5:32 PM Karen Arriaza Add [R07 89] Chest wall pain     9/10/2018  5:33 PM Madeline Nolasco Add [M79 1] Muscle pain       ED Disposition     ED Disposition Condition Comment    Discharge  Tara Marquez discharge to home/self care  Condition at discharge: Stable        Follow-up Information     Follow up With Specialties Details Why [facility] physician  Schedule an appointment as soon as possible for a visit            Patient's Medications   Discharge Prescriptions    No medications on file     No discharge procedures on file      ED Provider  Electronically Signed by           Jamari Giron MD  09/10/18 895 Eh Ge MD  09/10/18 [id number]

## 2018-11-07 ENCOUNTER — TELEPHONE (OUTPATIENT)
Dept: CARDIOLOGY CLINIC | Facility: CLINIC | Age: 38
End: 2018-11-07

## 2018-11-07 NOTE — TELEPHONE ENCOUNTER
P/C with c/o intensifying CP located on right side of her chest  Pt stated she was seen in ER previously for same symptoms in September   Advised to return to ED, and will f/u in the am

## 2018-11-12 ENCOUNTER — OFFICE VISIT (OUTPATIENT)
Dept: CARDIOLOGY CLINIC | Facility: CLINIC | Age: 38
End: 2018-11-12
Payer: COMMERCIAL

## 2018-11-12 VITALS
DIASTOLIC BLOOD PRESSURE: 60 MMHG | HEIGHT: 61 IN | SYSTOLIC BLOOD PRESSURE: 82 MMHG | WEIGHT: 143 LBS | HEART RATE: 69 BPM | BODY MASS INDEX: 27 KG/M2

## 2018-11-12 DIAGNOSIS — I42.0 DILATED CARDIOMYOPATHY (HCC): ICD-10-CM

## 2018-11-12 DIAGNOSIS — R07.1 CHEST PAIN ON BREATHING: Primary | ICD-10-CM

## 2018-11-12 DIAGNOSIS — I10 ESSENTIAL HYPERTENSION: ICD-10-CM

## 2018-11-12 DIAGNOSIS — I44.7 LEFT BUNDLE-BRANCH BLOCK: ICD-10-CM

## 2018-11-12 DIAGNOSIS — I50.22 CHRONIC SYSTOLIC CONGESTIVE HEART FAILURE (HCC): ICD-10-CM

## 2018-11-12 PROCEDURE — 93000 ELECTROCARDIOGRAM COMPLETE: CPT | Performed by: INTERNAL MEDICINE

## 2018-11-12 PROCEDURE — 99214 OFFICE O/P EST MOD 30 MIN: CPT | Performed by: INTERNAL MEDICINE

## 2018-11-12 NOTE — PROGRESS NOTES
Cardiology Follow Up    Vivi Fletcher  1980  [id number]  [address] [facility]  [address] [place] [address]  [phone]    1  Chest pain on breathing  POCT ECG   2  Dilated cardiomyopathy ([address] )  Echo complete with contrast if indicated   3  Chronic systolic congestive heart failure ([address] )     4  Left bundle-branch block     5  Essential hypertension           Discussion/Summary:Her symptoms are c/w pleuritic pain / inflammation  I will check an ECHO and have her take Motrin 400 mg BID for the next 10 days  She may need a longer course if the symptoms persist  I have reviewed the rest of her medications and made no changes  RTO 1 year  Interval History: She has been having R sided pleuritic CP since September which she feels is getting worse  The pain is worse with taking a deep breath and slightly worse when lying flat  She was seen in the ER  Troponin was normal  CXR was normal  She is presently taking Tylenol for the pain which helps a little  He has a non ischemic CM with a biV pacemaker  Last ECHO showed an EF of 50%      Patient Active Problem List   Diagnosis    Acute bronchitis    Acute rhinitis    Acute sinusitis    Anxiety    Cardiomyopathy ([address] )    Chronic systolic congestive heart failure ([address] )    Hypertension    Left bundle-branch block    Menorrhagia with regular cycle    Mitral regurgitation    Paroxysmal atrial fibrillation (HCC)     Past Medical History:   Diagnosis Date    Anxiety     Atrial fibrillation (HCC)     paroxysmal    Bunion 1985    Candidal vulvovaginitis     Cardiomyopathy ([address] )     Chest pain     Herpes simplex     Hypertension     LBBB (left bundle branch block)     Normal delivery     2005 son, 2008 daughter   Coty Hernandez Pacemaker      Social History     Social History    Marital status: /Civil Union     Spouse name: N/A    Number of children: N/A    Years of education: N/A     Occupational History    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      Social History Main Topics    Smoking status: Never Smoker    Smokeless tobacco: Never Used    Alcohol use Yes      Comment: social    Drug use: No    Sexual activity: Yes     Partners: Male     Birth control/ protection: Female Sterilization     Other Topics Concern    Not on file     Social History Narrative    No narrative on file      Family History   Problem Relation Age of Onset    Diabetes Father     Hypertension Father     Heart disease Father     Hyperlipidemia Father      Past Surgical History:   Procedure Laterality Date    CARDIAC DEFIBRILLATOR PLACEMENT      TUBAL LIGATION  2009       Current Outpatient Prescriptions:     carvedilol (COREG) 25 mg tablet, Take 25 mg by mouth 2 (two) times a day with meals  , Disp: , Rfl:     lisinopril (ZESTRIL) 5 mg tablet, Take 1 tablet by mouth 2 (two) times a day, Disp: , Rfl:   No Known Allergies  Vitals:    11/12/18 0828   BP: (!) 82/60   BP Location: Left arm   Patient Position: Sitting   Cuff Size: Standard   Pulse: 69   Weight: 64 9 kg (143 lb)   Height: 5' 1" (1 549 m)     Weight (last 2 days)     Date/Time   Weight    11/12/18 0828  64 9 (143)             Blood pressure (!) 82/60, pulse 69, height 5' 1" (1 549 m), weight 64 9 kg (143 lb), not currently breastfeeding , Body mass index is 27 02 kg/m²      Labs:  Admission on 09/10/2018, Discharged on 09/10/2018   Component Date Value    Sodium 09/10/2018 136     Potassium 09/10/2018 3 9     Chloride 09/10/2018 103     CO2 09/10/2018 26     ANION GAP 09/10/2018 7     BUN 09/10/2018 12     Creatinine 09/10/2018 0 61     Glucose 09/10/2018 88     Calcium 09/10/2018 9 0     AST 09/10/2018 14     ALT 09/10/2018 27     Alkaline Phosphatase 09/10/2018 45*    Total Protein 09/10/2018 7 2     Albumin 09/10/2018 3 7     Total Bilirubin 09/10/2018 0 20     eGFR 09/10/2018 114     WBC 09/10/2018 5 86     RBC 09/10/2018 4 12     Hemoglobin 09/10/2018 11 7     Hematocrit 09/10/2018 35 8     MCV 09/10/2018 87     MCH 09/10/2018 28 4     MCHC 09/10/2018 32 7     RDW 09/10/2018 12 2     MPV 09/10/2018 10 9     Platelets 46/37/0764 202     nRBC 09/10/2018 0     Neutrophils Relative 09/10/2018 41*    Immat GRANS % 09/10/2018 0     Lymphocytes Relative 09/10/2018 42     Monocytes Relative 09/10/2018 12     Eosinophils Relative 09/10/2018 4     Basophils Relative 09/10/2018 1     Neutrophils Absolute 09/10/2018 2 48     Immature Grans Absolute 09/10/2018 0 01     Lymphocytes Absolute 09/10/2018 2 44     Monocytes Absolute 09/10/2018 0 68     Eosinophils Absolute 09/10/2018 0 22     Basophils Absolute 09/10/2018 0 03     Troponin I 09/10/2018 <0 02     D-Dimer, Quant 09/10/2018 349     Ventricular Rate 09/10/2018 63     Atrial Rate 09/10/2018 63     CA Interval 09/10/2018 162     QRSD Interval 09/10/2018 96     QT Interval 09/10/2018 394     QTC Interval 09/10/2018 403     P Axis 09/10/2018 -60     QRS [place] 09/10/2018 -31     T Wave Axis 09/10/2018 60      Imaging: No results found  Review of Systems:  Review of Systems   Constitutional: Negative for diaphoresis, fatigue, fever and unexpected weight change  HENT: Negative  Respiratory: Negative for cough, shortness of breath and wheezing  Cardiovascular: Positive for chest pain  Negative for palpitations and leg swelling  Gastrointestinal: Negative for abdominal pain, diarrhea and nausea  Musculoskeletal: Negative for gait problem and myalgias  Skin: Negative for rash  Neurological: Negative for dizziness and numbness  Psychiatric/Behavioral: Negative  Physical Exam:  Physical Exam   Constitutional: She is oriented to person, place, and time  She appears well-developed and well-nourished  HENT:   Head: Normocephalic and atraumatic  Eyes: Pupils are equal, round, and reactive to light  Neck: Normal range of motion  Neck supple  No JVD present  Cardiovascular: Regular rhythm, S1 normal, S2 normal and normal pulses  Pulses:       Carotid pulses are 2+ on the right side, and 2+ on the left side  Pulmonary/Chest: Effort normal and breath sounds normal  She has no wheezes  She has no rales  Abdominal: Soft  Bowel sounds are normal  There is no tenderness  Musculoskeletal: Normal range of motion  She exhibits no edema or tenderness  Neurological: She is alert and oriented to person, place, and time  She has normal reflexes  No cranial nerve deficit  Skin: Skin is warm  Psychiatric: She has a normal mood and affect

## 2018-12-01 DIAGNOSIS — I42.0 DILATED CARDIOMYOPATHY (HCC): Primary | ICD-10-CM

## 2018-12-03 RX ORDER — CARVEDILOL 25 MG/1
TABLET ORAL
Qty: 180 TABLET | Refills: 3 | Status: SHIPPED | OUTPATIENT
Start: 2018-12-03 | End: 2019-11-17 | Stop reason: SDUPTHER

## 2018-12-03 RX ORDER — LISINOPRIL 5 MG/1
TABLET ORAL
Qty: 180 TABLET | Refills: 3 | Status: SHIPPED | OUTPATIENT
Start: 2018-12-03 | End: 2019-11-17 | Stop reason: SDUPTHER

## 2018-12-11 ENCOUNTER — IN-CLINIC DEVICE VISIT (OUTPATIENT)
Dept: CARDIOLOGY CLINIC | Facility: CLINIC | Age: 38
End: 2018-12-11
Payer: COMMERCIAL

## 2018-12-11 ENCOUNTER — HOSPITAL ENCOUNTER (OUTPATIENT)
Dept: NON INVASIVE DIAGNOSTICS | Facility: CLINIC | Age: 38
Discharge: HOME | End: 2018-12-11
Payer: COMMERCIAL

## 2018-12-11 DIAGNOSIS — I47.2 VT (VENTRICULAR TACHYCARDIA) (HCC): Primary | ICD-10-CM

## 2018-12-11 DIAGNOSIS — Z95.810 AICD (AUTOMATIC CARDIOVERTER/DEFIBRILLATOR) PRESENT: ICD-10-CM

## 2018-12-11 DIAGNOSIS — I42.0 DILATED CARDIOMYOPATHY (HCC): ICD-10-CM

## 2018-12-11 PROCEDURE — 93306 TTE W/DOPPLER COMPLETE: CPT

## 2018-12-11 PROCEDURE — 93306 TTE W/DOPPLER COMPLETE: CPT | Performed by: INTERNAL MEDICINE

## 2018-12-11 PROCEDURE — 93283 PRGRMG EVAL IMPLANTABLE DFB: CPT | Performed by: INTERNAL MEDICINE

## 2018-12-13 ENCOUNTER — TELEPHONE (OUTPATIENT)
Dept: CARDIOLOGY CLINIC | Facility: CLINIC | Age: 38
End: 2018-12-13

## 2018-12-13 NOTE — PROGRESS NOTES
Results for orders placed or performed in visit on 12/11/18   Cardiac EP device report    Narrative    CRT-D  DEVICE INTERROGATED IN THE [place] OFFICE: BATTERY VOLTAGE ADEQUATE  AP 0%  98%  VSRP 1 4%  ALL AVAILABLE LEAD PARAMETERS WITHIN NORMAL LIMITS  NO SIGNIFICANT HIGH RATE EPISODES  OPTI-VOL WITHIN NORMAL LIMITS  NO PROGRAMMING CHANGES MADE TO DEVICE PARAMETERS  NORMAL DEVICE FUNCTION   NC

## 2019-01-25 ENCOUNTER — OFFICE VISIT (OUTPATIENT)
Dept: CARDIOLOGY CLINIC | Facility: CLINIC | Age: 39
End: 2019-01-25
Payer: COMMERCIAL

## 2019-01-25 VITALS
DIASTOLIC BLOOD PRESSURE: 58 MMHG | BODY MASS INDEX: 26.62 KG/M2 | SYSTOLIC BLOOD PRESSURE: 100 MMHG | HEIGHT: 61 IN | HEART RATE: 80 BPM | WEIGHT: 141 LBS

## 2019-01-25 DIAGNOSIS — I44.7 LEFT BUNDLE-BRANCH BLOCK: ICD-10-CM

## 2019-01-25 DIAGNOSIS — R07.1 CHEST PAIN ON BREATHING: Primary | ICD-10-CM

## 2019-01-25 DIAGNOSIS — I42.0 DILATED CARDIOMYOPATHY (HCC): ICD-10-CM

## 2019-01-25 DIAGNOSIS — I48.0 PAROXYSMAL ATRIAL FIBRILLATION (HCC): ICD-10-CM

## 2019-01-25 PROCEDURE — 99213 OFFICE O/P EST LOW 20 MIN: CPT | Performed by: INTERNAL MEDICINE

## 2019-01-25 NOTE — PROGRESS NOTES
Cardiology Follow Up    Sindy Scherer  1980  [id number]  [address] [facility] [place]  [address] [address]  [phone]    1  Chest pain on breathing  CTA chest pe study    Basic metabolic panel   2  Dilated cardiomyopathy ([address] )     3  Left bundle-branch block     4  Paroxysmal atrial fibrillation (HCC)           Discussion/Summary: I do not feel that her CP is cardiac related  I will get a CT chest / Pulmonary embolus study to rule of PE  With her previous heart problems, she is anxious about this ongoing pain  I tried to reassure her that her heart condition is very stable  RTO 9 months  Interval History: She continues to have R sided upper chest pain worse with breathing  This has been going on since 9/2018  It is better  She still exercises regularly and it does not limit her activity  Recent ECHO showed an EF of 45% which is basically unchanged for her  She had previous cardiac cath which showed patent coronary arteries      Patient Active Problem List   Diagnosis    Acute bronchitis    Acute rhinitis    Acute sinusitis    Anxiety    Cardiomyopathy [facility] [address] )    Hypertension    Left bundle-branch block    Menorrhagia with regular cycle    Mitral regurgitation    Paroxysmal atrial fibrillation (HCC)     Past Medical History:   Diagnosis Date    Anxiety     Atrial fibrillation (HCC)     paroxysmal    Bunion 1985    Candidal vulvovaginitis     Cardiomyopathy ([address] )     Chest pain     Herpes simplex     Hypertension     LBBB (left bundle branch block)     Normal delivery     2005 son, 2008 daughter   Reshma Mayen Pacemaker      Social History     Social History    Marital status: /Civil Union     Spouse name: N/A    Number of children: N/A    Years of education: N/A     Occupational History    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      Social History Main Topics    Smoking status: Never Smoker    Smokeless tobacco: Never Used    Alcohol use Yes      Comment: social    Drug use: No    Sexual activity: Yes     Partners: Male     Birth control/ protection: Female Sterilization     Other Topics Concern    Not on file     Social History Narrative    No narrative on file      Family History   Problem Relation Age of Onset    Diabetes Father     Hypertension Father     Heart disease Father     Hyperlipidemia Father      Past Surgical History:   Procedure Laterality Date    CARDIAC DEFIBRILLATOR PLACEMENT      TUBAL LIGATION  2009       Current Outpatient Prescriptions:     carvedilol (COREG) 25 mg tablet, TAKE 1 TABLET TWICE DAILY, Disp: 180 tablet, Rfl: 3    lisinopril (ZESTRIL) 5 mg tablet, TAKE 1 TABLET TWICE DAILY, Disp: 180 tablet, Rfl: 3  No Known Allergies  Vitals:    01/25/19 0849   BP: 100/58   BP Location: Left arm   Patient Position: Sitting   Cuff Size: Standard   Pulse: 80   Weight: 64 kg (141 lb)   Height: 5' 1" (1 549 m)     Weight (last 2 days)     Date/Time   Weight    01/25/19 0849  64 (141)             Blood pressure 100/58, pulse 80, height 5' 1" (1 549 m), weight 64 kg (141 lb), not currently breastfeeding , Body mass index is 26 64 kg/m²      Labs:  Office Visit on 11/12/2018   Component Date Value    INTERPRETATIONS 11/12/2018     Admission on 09/10/2018, Discharged on 09/10/2018   Component Date Value    Sodium 09/10/2018 136     Potassium 09/10/2018 3 9     Chloride 09/10/2018 103     CO2 09/10/2018 26     ANION GAP 09/10/2018 7     BUN 09/10/2018 12     Creatinine 09/10/2018 0 61     Glucose 09/10/2018 88     Calcium 09/10/2018 9 0     AST 09/10/2018 14     ALT 09/10/2018 27     Alkaline Phosphatase 09/10/2018 45*    Total Protein 09/10/2018 7 2     Albumin 09/10/2018 3 7     Total Bilirubin 09/10/2018 0 20     eGFR 09/10/2018 114     WBC 09/10/2018 5 86     RBC 09/10/2018 4 12     Hemoglobin 09/10/2018 11 7     Hematocrit 09/10/2018 35 8     MCV 09/10/2018 87     MCH 09/10/2018 28 4     MCHC 09/10/2018 32 7     RDW 09/10/2018 12 2     MPV 09/10/2018 10 9     Platelets 22/71/1811 202     nRBC 09/10/2018 0     Neutrophils Relative 09/10/2018 41*    Immat GRANS % 09/10/2018 0     Lymphocytes Relative 09/10/2018 42     Monocytes Relative 09/10/2018 12     Eosinophils Relative 09/10/2018 4     Basophils Relative 09/10/2018 1     Neutrophils Absolute 09/10/2018 2 48     Immature Grans Absolute 09/10/2018 0 01     Lymphocytes Absolute 09/10/2018 2 44     Monocytes Absolute 09/10/2018 0 68     Eosinophils Absolute 09/10/2018 0 22     Basophils Absolute 09/10/2018 0 03     Troponin I 09/10/2018 <0 02     D-Dimer, Quant 09/10/2018 349     Ventricular Rate 09/10/2018 63     Atrial Rate 09/10/2018 63     LA Interval 09/10/2018 162     QRSD Interval 09/10/2018 96     QT Interval 09/10/2018 394     QTC Interval 09/10/2018 403     P Axis 09/10/2018 -60     QRS Apex 09/10/2018 -31     T Wave Axis 09/10/2018 60      Imaging: No results found  Review of Systems:  Review of Systems   Constitutional: Negative for diaphoresis, fatigue, fever and unexpected weight change  HENT: Negative  Respiratory: Negative for cough, shortness of breath and wheezing  Cardiovascular: Positive for chest pain  Negative for palpitations and leg swelling  Gastrointestinal: Negative for abdominal pain, diarrhea and nausea  Musculoskeletal: Negative for gait problem and myalgias  Skin: Negative for rash  Neurological: Negative for dizziness and numbness  Psychiatric/Behavioral: Negative  Physical Exam:  Physical Exam   Constitutional: She is oriented to person, place, and time  She appears well-developed and well-nourished  HENT:   Head: Normocephalic and atraumatic  Eyes: Pupils are equal, round, and reactive to light  Neck: Normal range of motion  Neck supple  No JVD present     Cardiovascular: Regular rhythm, S1 normal, S2 normal and normal pulses  Pulses:       Carotid pulses are 2+ on the right side, and 2+ on the left side  Pulmonary/Chest: Effort normal and breath sounds normal  She has no wheezes  She has no rales  Abdominal: Soft  Bowel sounds are normal  There is no tenderness  Musculoskeletal: Normal range of motion  She exhibits no edema or tenderness  Neurological: She is alert and oriented to person, place, and time  She has normal reflexes  No cranial nerve deficit  Skin: Skin is warm  Psychiatric: She has a normal mood and affect

## 2019-01-26 ENCOUNTER — TRANSCRIBE ORDERS (OUTPATIENT)
Dept: ADMINISTRATIVE | Age: 39
End: 2019-01-26

## 2019-01-26 ENCOUNTER — APPOINTMENT (OUTPATIENT)
Dept: LAB | Age: 39
End: 2019-01-26
Payer: COMMERCIAL

## 2019-01-26 DIAGNOSIS — R07.1 PAINFUL RESPIRATION: ICD-10-CM

## 2019-01-26 DIAGNOSIS — R07.1 PAINFUL RESPIRATION: Primary | ICD-10-CM

## 2019-01-26 LAB
ANION GAP SERPL CALCULATED.3IONS-SCNC: 9 MMOL/L (ref 4–13)
BUN SERPL-MCNC: 24 MG/DL (ref 5–25)
CALCIUM SERPL-MCNC: 8.5 MG/DL (ref 8.3–10.1)
CHLORIDE SERPL-SCNC: 106 MMOL/L (ref 100–108)
CO2 SERPL-SCNC: 24 MMOL/L (ref 21–32)
CREAT SERPL-MCNC: 0.6 MG/DL (ref 0.6–1.3)
GFR SERPL CREATININE-BSD FRML MDRD: 114 ML/MIN/1.73SQ M
GLUCOSE P FAST SERPL-MCNC: 104 MG/DL (ref 65–99)
POTASSIUM SERPL-SCNC: 4.1 MMOL/L (ref 3.5–5.3)
SODIUM SERPL-SCNC: 139 MMOL/L (ref 136–145)

## 2019-01-26 PROCEDURE — 36415 COLL VENOUS BLD VENIPUNCTURE: CPT | Performed by: INTERNAL MEDICINE

## 2019-01-26 PROCEDURE — 80048 BASIC METABOLIC PNL TOTAL CA: CPT | Performed by: INTERNAL MEDICINE

## 2019-01-31 ENCOUNTER — TELEPHONE (OUTPATIENT)
Dept: CARDIOLOGY CLINIC | Facility: CLINIC | Age: 39
End: 2019-01-31

## 2019-01-31 ENCOUNTER — HOSPITAL ENCOUNTER (OUTPATIENT)
Dept: CT IMAGING | Facility: HOSPITAL | Age: 39
Discharge: HOME | End: 2019-01-31
Attending: INTERNAL MEDICINE
Payer: COMMERCIAL

## 2019-01-31 DIAGNOSIS — R07.1 CHEST PAIN ON BREATHING: ICD-10-CM

## 2019-01-31 PROCEDURE — 71275 CT ANGIOGRAPHY CHEST: CPT

## 2019-01-31 RX ADMIN — IOHEXOL 85 ML: 350 INJECTION, SOLUTION INTRAVENOUS at 17:03

## 2019-01-31 NOTE — TELEPHONE ENCOUNTER
Received call from radiology stating there was a critical result from pts CTA chest from today   Please Review

## 2019-02-05 ENCOUNTER — ANNUAL EXAM (OUTPATIENT)
Dept: OBGYN CLINIC | Facility: CLINIC | Age: 39
End: 2019-02-05
Payer: COMMERCIAL

## 2019-02-05 VITALS
HEIGHT: 62 IN | DIASTOLIC BLOOD PRESSURE: 62 MMHG | WEIGHT: 141 LBS | BODY MASS INDEX: 25.95 KG/M2 | SYSTOLIC BLOOD PRESSURE: 94 MMHG

## 2019-02-05 DIAGNOSIS — Z12.31 ENCOUNTER FOR SCREENING MAMMOGRAM FOR MALIGNANT NEOPLASM OF BREAST: ICD-10-CM

## 2019-02-05 DIAGNOSIS — R92.2 DENSE BREAST TISSUE: ICD-10-CM

## 2019-02-05 DIAGNOSIS — Z01.419 ENCOUNTER FOR GYNECOLOGICAL EXAMINATION (GENERAL) (ROUTINE) WITHOUT ABNORMAL FINDINGS: Primary | ICD-10-CM

## 2019-02-05 PROCEDURE — S0612 ANNUAL GYNECOLOGICAL EXAMINA: HCPCS | Performed by: OBSTETRICS & GYNECOLOGY

## 2019-02-05 NOTE — PROGRESS NOTES
Gary Marquez  1980      CC:  Yearly exam    S:  34 y o  female here for yearly exam  Her cycles are regular, getting slightly heavier, not crampy  She is sexually active  She uses tubal for contraception  She has no gynecologic concerns today  Multiple cardiac issues -stable per patient, she feels well    Works as manager for [employer]   Has a son and daughter, 10yo and 11yo  Had tubal after last delivery - was told should not get pregnant again due to cardiac issues      Last Pap 2/1/2018 - Normal Cytology, Neg HPV  Last Mammo 2/19/18 -3D, benign      Current Outpatient Prescriptions:     carvedilol (COREG) 25 mg tablet, TAKE 1 TABLET TWICE DAILY, Disp: 180 tablet, Rfl: 3    lisinopril (ZESTRIL) 5 mg tablet, TAKE 1 TABLET TWICE DAILY, Disp: 180 tablet, Rfl: 3  Social History     Social History    Marital status: /Civil Union     Spouse name: N/A    Number of children: N/A    Years of education: N/A     Occupational History    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      Social History Main Topics    Smoking status: Never Smoker    Smokeless tobacco: Never Used    Alcohol use Yes      Comment: social    Drug use: No    Sexual activity: Yes     Partners: Male     Birth control/ protection: Female Sterilization     Other Topics Concern    Not on file     Social History Narrative    No narrative on file     Family History   Problem Relation Age of Onset    Diabetes Father     Hypertension Father     Heart disease Father     Hyperlipidemia Father     No Known Problems Mother     No Known Problems Brother     No Known Problems Brother       Past Medical History:   Diagnosis Date    Anxiety     Atrial fibrillation ([facility] [address] )     paroxysmal    Bunion 1985    Candidal vulvovaginitis     Cardiomyopathy ([facility] [address] )     Chest pain     Herpes simplex     Hypertension     LBBB (left bundle branch block)     Normal delivery     2005 son, 2008 daughter    Pacemaker         O:  Blood pressure 94/62, height 5' 2" (1 575 m), weight 64 kg (141 lb), last menstrual period 01/28/2019, not currently breastfeeding  Patient appears well and is not in distress  Neck is supple without masses  Breasts are symmetrical without mass, tenderness, nipple discharge, skin changes or adenopathy  Abdomen is soft and nontender without masses  External genitals are normal without lesions or rashes  Vagina is normal without discharge or bleeding  Cervix is normal without discharge or lesion  Uterus is normal, mobile, nontender without palpable mass  Adnexa are normal, nontender, without palpable mass  A:  Yearly exam      P:   Pap due 2023   Mammo slip provided    RTO one year for yearly exam or sooner as needed  Discussed heavy menses - tolerable for now, will try NSAIDS just prior to onset of menses, ATC first 2-3d  Could also consider lysteda (although would want cardiology clearance), or Mirena

## 2019-02-15 ENCOUNTER — TELEPHONE (OUTPATIENT)
Dept: CARDIOLOGY CLINIC | Facility: CLINIC | Age: 39
End: 2019-02-15

## 2019-02-15 NOTE — TELEPHONE ENCOUNTER
----- Message from Quan Fink MD sent at 2/15/2019 10:20 AM EST -----  Please tell her that her CT scan of her chest did not show any blood clots or pneumonia or any problems   it did show a small james nodule which does not cause any symptoms and just needs to be followed  This should be followed by her PCP  Not cardiac related

## 2019-02-19 ENCOUNTER — OFFICE VISIT (OUTPATIENT)
Dept: INTERNAL MEDICINE CLINIC | Facility: CLINIC | Age: 39
End: 2019-02-19
Payer: COMMERCIAL

## 2019-02-19 VITALS
WEIGHT: 145 LBS | TEMPERATURE: 97.6 F | HEART RATE: 68 BPM | HEIGHT: 61 IN | SYSTOLIC BLOOD PRESSURE: 106 MMHG | OXYGEN SATURATION: 98 % | DIASTOLIC BLOOD PRESSURE: 62 MMHG | BODY MASS INDEX: 27.38 KG/M2

## 2019-02-19 DIAGNOSIS — R07.1 PAIN OF ANTERIOR CHEST WALL WITH RESPIRATION: Primary | ICD-10-CM

## 2019-02-19 DIAGNOSIS — I42.0 DILATED CARDIOMYOPATHY (HCC): ICD-10-CM

## 2019-02-19 DIAGNOSIS — Z13.6 SCREENING FOR HEART DISEASE: ICD-10-CM

## 2019-02-19 DIAGNOSIS — Z13.1 SCREENING FOR DIABETES MELLITUS: ICD-10-CM

## 2019-02-19 PROCEDURE — 99204 OFFICE O/P NEW MOD 45 MIN: CPT | Performed by: NURSE PRACTITIONER

## 2019-02-19 PROCEDURE — 3008F BODY MASS INDEX DOCD: CPT | Performed by: NURSE PRACTITIONER

## 2019-02-19 NOTE — PROGRESS NOTES
Assessment/Plan:    Cardiomyopathy ([address] )  Cardiomyopathy diagnosed 10 years ago; postpartum  She is status post ICD shortly after her initial diagnosis  No ongoing complications with recent echo showing stable ejection fraction at 45%, routine follow-up with Cardiology  Pain of anterior chest wall with respiration  Reassured patient that her chest wall pain is not likely related to the 3 mm right upper lobe nodule found on recent CTA  The size is very small not likely to cause pain, she has no pulmonary abnormality on assessment today, her vitals are stable with normal activity tolerance  No concerning systemic complaints  The recommendation on the radiology report was to do a follow-up image in 12 months, however due to her pain and her history of secondhand smoke exposure as a child, I have ordered as CT to be done in 6 months for follow-up evaluation  For the time being we will do feel an x-ray of the thoracic spine to rule out anatomical abnormality that may be causing pain  Patient will return in 3 months for reassessment, she should call the office if any of this her symptoms worsen prior  Diagnoses and all orders for this visit:    Pain of anterior chest wall with respiration  -     XR spine thoracic 3 vw; Future  -     CT chest w contrast; Future  -     Comprehensive metabolic panel; Future  -     CBC and differential; Future    Screening for heart disease  -     Lipid panel; Future    Screening for diabetes mellitus  -     Comprehensive metabolic panel; Future  -     Hemoglobin A1C; Future    Dilated cardiomyopathy (HCC)          Subjective:      Patient ID: Pelon Naylor is a 34 y o  female  Patient is a 77-year-old female here today as a new patient to establish care  Past medical history includes 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, AFib, and anxiety    Patient is here today to discuss concern she has regarding right chest wall pain that started approximately September last year  She reports that she spoke with her previous PCP about this and they referred her to her cardiologist for evaluation given her cardiac history  Her cardiac evaluation was negative and a CTA was done in January  There was an incidental finding of a 3 mm RUL nodule found and the patient is concerned that this is associated with her pain symptoms  Pain occurs with inspiration and expiration, primarily at the anterior chest wall but radiating through to the posterior chest   She denies shortness of breath, cough, or activity intolerance  She does not smoke or have any environmental exposures  She did have second hand smoke exposure throughout her childhood  She denies fever, chills, night sweats, weight loss, appetite changes  No increased symptoms with ROM of her neck, back, or upper extremities  Denies numbness/tingling  The following portions of the patient's history were reviewed and updated as appropriate: allergies, current medications, past family history, past medical history, past social history, past surgical history and problem list     Review of Systems   Constitutional: Negative for activity change, appetite change, chills, fatigue, fever and unexpected weight change  HENT: Negative for hearing loss  Eyes: Negative for visual disturbance  Respiratory: Negative for cough, chest tightness and shortness of breath  Cardiovascular: Positive for chest pain (see hpi)  Negative for palpitations and leg swelling  Gastrointestinal: Negative for constipation, diarrhea, nausea and vomiting  Genitourinary: Negative for dysuria and frequency  Musculoskeletal: Negative for arthralgias and myalgias  Allergic/Immunologic: Negative for environmental allergies  Neurological: Negative for dizziness, weakness, numbness and headaches  Psychiatric/Behavioral: Negative for sleep disturbance  The patient is not nervous/anxious            Objective:      BP 106/62   Pulse 68   Temp 97 6 °F (36 4 °C)   Ht 5' 1" (1 549 m)   Wt 65 8 kg (145 lb)   LMP 01/28/2019 (Approximate)   SpO2 98%   BMI 27 40 kg/m²          Physical Exam   Constitutional: She is oriented to person, place, and time  Vital signs are normal  She appears well-developed and well-nourished  She is cooperative  HENT:   Right Ear: Hearing, tympanic membrane, external ear and ear canal normal    Left Ear: Hearing, tympanic membrane, external ear and ear canal normal    Nose: Mucosal edema present  Mouth/Throat: Uvula is midline, oropharynx is clear and moist and mucous membranes are normal    Eyes: Pupils are equal, round, and reactive to light  Conjunctivae and lids are normal    Neck: Full passive range of motion without pain  No JVD present  Carotid bruit is not present  Cardiovascular: Normal rate, regular rhythm, normal heart sounds and intact distal pulses  No murmur heard  Pulmonary/Chest: Effort normal and breath sounds normal  No respiratory distress  Abdominal: Soft  Normal appearance and bowel sounds are normal  There is no tenderness  Musculoskeletal: Normal range of motion  She exhibits no edema  Lymphadenopathy:        Head (right side): No submental, no submandibular, no tonsillar, no preauricular, no posterior auricular and no occipital adenopathy present  Head (left side): No submental, no submandibular, no tonsillar, no preauricular, no posterior auricular and no occipital adenopathy present  She has no cervical adenopathy  Neurological: She is alert and oriented to person, place, and time  She has normal strength  No sensory deficit  Skin: Skin is warm, dry and intact  Psychiatric: She has a normal mood and affect  Her speech is normal and behavior is normal  Judgment and thought content normal  Cognition and memory are normal    Vitals reviewed

## 2019-02-19 NOTE — ASSESSMENT & PLAN NOTE
Cardiomyopathy diagnosed 10 years ago; postpartum  She is status post ICD shortly after her initial diagnosis  No ongoing complications with recent echo showing stable ejection fraction at 45%, routine follow-up with Cardiology

## 2019-02-19 NOTE — ASSESSMENT & PLAN NOTE
Reassured patient that her chest wall pain is not likely related to the 3 mm right upper lobe nodule found on recent CTA  The size is very small not likely to cause pain, she has no pulmonary abnormality on assessment today, her vitals are stable with normal activity tolerance  No concerning systemic complaints  The recommendation on the radiology report was to do a follow-up image in 12 months, however due to her pain and her history of secondhand smoke exposure as a child, I have ordered as CT to be done in 6 months for follow-up evaluation  For the time being we will do feel an x-ray of the thoracic spine to rule out anatomical abnormality that may be causing pain  Patient will return in 3 months for reassessment, she should call the office if any of this her symptoms worsen prior

## 2019-02-25 ENCOUNTER — HOSPITAL ENCOUNTER (OUTPATIENT)
Dept: RADIOLOGY | Age: 39
Discharge: HOME | End: 2019-02-25
Payer: COMMERCIAL

## 2019-02-25 VITALS — WEIGHT: 145 LBS | HEIGHT: 61 IN | BODY MASS INDEX: 27.38 KG/M2

## 2019-02-25 DIAGNOSIS — R92.2 DENSE BREAST TISSUE: ICD-10-CM

## 2019-02-25 DIAGNOSIS — Z12.31 ENCOUNTER FOR SCREENING MAMMOGRAM FOR MALIGNANT NEOPLASM OF BREAST: ICD-10-CM

## 2019-02-25 PROCEDURE — 77063 BREAST TOMOSYNTHESIS BI: CPT

## 2019-02-25 PROCEDURE — 77067 SCR MAMMO BI INCL CAD: CPT

## 2019-03-05 ENCOUNTER — APPOINTMENT (OUTPATIENT)
Dept: LAB | Age: 39
End: 2019-03-05
Payer: COMMERCIAL

## 2019-03-05 ENCOUNTER — APPOINTMENT (OUTPATIENT)
Dept: RADIOLOGY | Age: 39
End: 2019-03-05
Payer: COMMERCIAL

## 2019-03-05 DIAGNOSIS — R07.1 PAIN OF ANTERIOR CHEST WALL WITH RESPIRATION: ICD-10-CM

## 2019-03-05 DIAGNOSIS — Z13.6 SCREENING FOR HEART DISEASE: ICD-10-CM

## 2019-03-05 DIAGNOSIS — Z13.1 SCREENING FOR DIABETES MELLITUS: ICD-10-CM

## 2019-03-05 LAB
ALBUMIN SERPL BCP-MCNC: 3.5 G/DL (ref 3.5–5)
ALP SERPL-CCNC: 41 U/L (ref 46–116)
ALT SERPL W P-5'-P-CCNC: 21 U/L (ref 12–78)
ANION GAP SERPL CALCULATED.3IONS-SCNC: 6 MMOL/L (ref 4–13)
AST SERPL W P-5'-P-CCNC: 12 U/L (ref 5–45)
BASOPHILS # BLD AUTO: 0.04 THOUSANDS/ΜL (ref 0–0.1)
BASOPHILS NFR BLD AUTO: 1 % (ref 0–1)
BILIRUB SERPL-MCNC: 0.32 MG/DL (ref 0.2–1)
BUN SERPL-MCNC: 14 MG/DL (ref 5–25)
CALCIUM SERPL-MCNC: 8.2 MG/DL (ref 8.3–10.1)
CHLORIDE SERPL-SCNC: 106 MMOL/L (ref 100–108)
CHOLEST SERPL-MCNC: 158 MG/DL (ref 50–200)
CO2 SERPL-SCNC: 27 MMOL/L (ref 21–32)
CREAT SERPL-MCNC: 0.51 MG/DL (ref 0.6–1.3)
EOSINOPHIL # BLD AUTO: 0.27 THOUSAND/ΜL (ref 0–0.61)
EOSINOPHIL NFR BLD AUTO: 5 % (ref 0–6)
ERYTHROCYTE [DISTWIDTH] IN BLOOD BY AUTOMATED COUNT: 12.8 % (ref 11.6–15.1)
EST. AVERAGE GLUCOSE BLD GHB EST-MCNC: 100 MG/DL
GFR SERPL CREATININE-BSD FRML MDRD: 121 ML/MIN/1.73SQ M
GLUCOSE P FAST SERPL-MCNC: 94 MG/DL (ref 65–99)
HBA1C MFR BLD: 5.1 % (ref 4.2–6.3)
HCT VFR BLD AUTO: 34.7 % (ref 34.8–46.1)
HDLC SERPL-MCNC: 46 MG/DL (ref 40–60)
HGB BLD-MCNC: 11.1 G/DL (ref 11.5–15.4)
IMM GRANULOCYTES # BLD AUTO: 0.01 THOUSAND/UL (ref 0–0.2)
IMM GRANULOCYTES NFR BLD AUTO: 0 % (ref 0–2)
LDLC SERPL CALC-MCNC: 87 MG/DL (ref 0–100)
LYMPHOCYTES # BLD AUTO: 1.76 THOUSANDS/ΜL (ref 0.6–4.47)
LYMPHOCYTES NFR BLD AUTO: 34 % (ref 14–44)
MCH RBC QN AUTO: 28.3 PG (ref 26.8–34.3)
MCHC RBC AUTO-ENTMCNC: 32 G/DL (ref 31.4–37.4)
MCV RBC AUTO: 89 FL (ref 82–98)
MONOCYTES # BLD AUTO: 0.57 THOUSAND/ΜL (ref 0.17–1.22)
MONOCYTES NFR BLD AUTO: 11 % (ref 4–12)
NEUTROPHILS # BLD AUTO: 2.48 THOUSANDS/ΜL (ref 1.85–7.62)
NEUTS SEG NFR BLD AUTO: 49 % (ref 43–75)
NONHDLC SERPL-MCNC: 112 MG/DL
NRBC BLD AUTO-RTO: 0 /100 WBCS
PLATELET # BLD AUTO: 162 THOUSANDS/UL (ref 149–390)
PMV BLD AUTO: 12 FL (ref 8.9–12.7)
POTASSIUM SERPL-SCNC: 4.3 MMOL/L (ref 3.5–5.3)
PROT SERPL-MCNC: 6.5 G/DL (ref 6.4–8.2)
RBC # BLD AUTO: 3.92 MILLION/UL (ref 3.81–5.12)
SODIUM SERPL-SCNC: 139 MMOL/L (ref 136–145)
TRIGL SERPL-MCNC: 126 MG/DL
WBC # BLD AUTO: 5.13 THOUSAND/UL (ref 4.31–10.16)

## 2019-03-05 PROCEDURE — 80053 COMPREHEN METABOLIC PANEL: CPT

## 2019-03-05 PROCEDURE — 83036 HEMOGLOBIN GLYCOSYLATED A1C: CPT

## 2019-03-05 PROCEDURE — 72072 X-RAY EXAM THORAC SPINE 3VWS: CPT

## 2019-03-05 PROCEDURE — 36415 COLL VENOUS BLD VENIPUNCTURE: CPT

## 2019-03-05 PROCEDURE — 80061 LIPID PANEL: CPT

## 2019-03-05 PROCEDURE — 85025 COMPLETE CBC W/AUTO DIFF WBC: CPT

## 2019-03-14 ENCOUNTER — REMOTE DEVICE CLINIC VISIT (OUTPATIENT)
Dept: CARDIOLOGY CLINIC | Facility: CLINIC | Age: 39
End: 2019-03-14
Payer: COMMERCIAL

## 2019-03-14 DIAGNOSIS — I44.7 LBBB (LEFT BUNDLE BRANCH BLOCK): ICD-10-CM

## 2019-03-14 DIAGNOSIS — I42.0 DILATED CARDIOMYOPATHY (HCC): Primary | ICD-10-CM

## 2019-03-14 DIAGNOSIS — Z95.810 BIVENTRICULAR ICD (IMPLANTABLE CARDIOVERTER-DEFIBRILLATOR) IN PLACE: ICD-10-CM

## 2019-03-14 DIAGNOSIS — I48.0 PAROXYSMAL ATRIAL FIBRILLATION (HCC): ICD-10-CM

## 2019-03-14 PROCEDURE — 93295 DEV INTERROG REMOTE 1/2/MLT: CPT | Performed by: INTERNAL MEDICINE

## 2019-03-14 PROCEDURE — 93296 REM INTERROG EVL PM/IDS: CPT | Performed by: INTERNAL MEDICINE

## 2019-03-14 NOTE — PROGRESS NOTES
Results for orders placed or performed in visit on 03/14/19   Cardiac EP device report    Narrative    CRT-D  CARELINK TRANSMISSION: BATTERY VOLTAGE ADEQUATE (5 3 YRS)  AP - 0% BVP - 99 4% ( - 98% + VSR PACE - 1 4%)  ALL AVAILABLE LEAD PARAMETERS WITHIN NORMAL LIMITS  NO SIGNIFICANT HIGH RATE EPISODES  211 V SENSE (5 2 MIN/DAY) w/CHANNEL MARKERS SHOWING STACH, V FUSION BEATS  LONGEST EPISODE @ 29 MIN 13 SECS  AVG V RATES 133-162 BPM  OPTI-VOL WITHIN NORMAL LIMITS  NORMAL DEVICE FUNCTION     EB

## 2019-05-09 ENCOUNTER — APPOINTMENT (OUTPATIENT)
Dept: LAB | Facility: CLINIC | Age: 39
End: 2019-05-09
Payer: COMMERCIAL

## 2019-05-09 ENCOUNTER — TRANSCRIBE ORDERS (OUTPATIENT)
Dept: LAB | Facility: CLINIC | Age: 39
End: 2019-05-09

## 2019-05-09 DIAGNOSIS — Z01.818 OTHER SPECIFIED PRE-OPERATIVE EXAMINATION: ICD-10-CM

## 2019-05-09 DIAGNOSIS — Z01.818 OTHER SPECIFIED PRE-OPERATIVE EXAMINATION: Primary | ICD-10-CM

## 2019-05-09 LAB
APTT PPP: 28 SECONDS (ref 26–38)
BASOPHILS # BLD AUTO: 0.03 THOUSANDS/ΜL (ref 0–0.1)
BASOPHILS NFR BLD AUTO: 1 % (ref 0–1)
EOSINOPHIL # BLD AUTO: 0.29 THOUSAND/ΜL (ref 0–0.61)
EOSINOPHIL NFR BLD AUTO: 6 % (ref 0–6)
ERYTHROCYTE [DISTWIDTH] IN BLOOD BY AUTOMATED COUNT: 12.1 % (ref 11.6–15.1)
HCT VFR BLD AUTO: 38.8 % (ref 34.8–46.1)
HGB BLD-MCNC: 12.7 G/DL (ref 11.5–15.4)
IMM GRANULOCYTES # BLD AUTO: 0.01 THOUSAND/UL (ref 0–0.2)
IMM GRANULOCYTES NFR BLD AUTO: 0 % (ref 0–2)
INR PPP: 1.05 (ref 0.86–1.17)
LYMPHOCYTES # BLD AUTO: 1.82 THOUSANDS/ΜL (ref 0.6–4.47)
LYMPHOCYTES NFR BLD AUTO: 36 % (ref 14–44)
MCH RBC QN AUTO: 28.7 PG (ref 26.8–34.3)
MCHC RBC AUTO-ENTMCNC: 32.7 G/DL (ref 31.4–37.4)
MCV RBC AUTO: 88 FL (ref 82–98)
MONOCYTES # BLD AUTO: 0.78 THOUSAND/ΜL (ref 0.17–1.22)
MONOCYTES NFR BLD AUTO: 15 % (ref 4–12)
NEUTROPHILS # BLD AUTO: 2.19 THOUSANDS/ΜL (ref 1.85–7.62)
NEUTS SEG NFR BLD AUTO: 42 % (ref 43–75)
NRBC BLD AUTO-RTO: 0 /100 WBCS
PLATELET # BLD AUTO: 182 THOUSANDS/UL (ref 149–390)
PMV BLD AUTO: 11.3 FL (ref 8.9–12.7)
PROTHROMBIN TIME: 13.4 SECONDS (ref 11.8–14.2)
RBC # BLD AUTO: 4.42 MILLION/UL (ref 3.81–5.12)
WBC # BLD AUTO: 5.12 THOUSAND/UL (ref 4.31–10.16)

## 2019-05-09 PROCEDURE — 85610 PROTHROMBIN TIME: CPT

## 2019-05-09 PROCEDURE — 36415 COLL VENOUS BLD VENIPUNCTURE: CPT

## 2019-05-09 PROCEDURE — 85730 THROMBOPLASTIN TIME PARTIAL: CPT

## 2019-05-09 PROCEDURE — 85025 COMPLETE CBC W/AUTO DIFF WBC: CPT

## 2019-05-28 ENCOUNTER — ANESTHESIA EVENT (OUTPATIENT)
Dept: PERIOP | Facility: HOSPITAL | Age: 39
End: 2019-05-28
Payer: SELF-PAY

## 2019-05-29 ENCOUNTER — ANESTHESIA (OUTPATIENT)
Dept: PERIOP | Facility: HOSPITAL | Age: 39
End: 2019-05-29
Payer: SELF-PAY

## 2019-05-29 ENCOUNTER — HOSPITAL ENCOUNTER (OUTPATIENT)
Facility: HOSPITAL | Age: 39
Setting detail: OBSERVATION
Discharge: HOME | End: 2019-05-30
Attending: PLASTIC SURGERY | Admitting: PLASTIC SURGERY
Payer: SELF-PAY

## 2019-05-29 DIAGNOSIS — Z41.1 ENCOUNTER FOR COSMETIC SURGERY: ICD-10-CM

## 2019-05-29 DIAGNOSIS — E88.1 LIPODYSTROPHY: Primary | ICD-10-CM

## 2019-05-29 LAB — EXT PREGNANCY TEST URINE: NEGATIVE

## 2019-05-29 PROCEDURE — 81025 URINE PREGNANCY TEST: CPT | Performed by: PLASTIC SURGERY

## 2019-05-29 PROCEDURE — 88302 TISSUE EXAM BY PATHOLOGIST: CPT | Performed by: PATHOLOGY

## 2019-05-29 RX ORDER — MAGNESIUM HYDROXIDE 1200 MG/15ML
LIQUID ORAL AS NEEDED
Status: DISCONTINUED | OUTPATIENT
Start: 2019-05-29 | End: 2019-05-29 | Stop reason: HOSPADM

## 2019-05-29 RX ORDER — MEPERIDINE HYDROCHLORIDE 25 MG/ML
12.5 INJECTION INTRAMUSCULAR; INTRAVENOUS; SUBCUTANEOUS
Status: DISCONTINUED | OUTPATIENT
Start: 2019-05-29 | End: 2019-05-29 | Stop reason: HOSPADM

## 2019-05-29 RX ORDER — ONDANSETRON 2 MG/ML
4 INJECTION INTRAMUSCULAR; INTRAVENOUS ONCE AS NEEDED
Status: DISCONTINUED | OUTPATIENT
Start: 2019-05-29 | End: 2019-05-29 | Stop reason: HOSPADM

## 2019-05-29 RX ORDER — MIDAZOLAM HYDROCHLORIDE 1 MG/ML
INJECTION INTRAMUSCULAR; INTRAVENOUS AS NEEDED
Status: DISCONTINUED | OUTPATIENT
Start: 2019-05-29 | End: 2019-05-29 | Stop reason: SURG

## 2019-05-29 RX ORDER — GLYCOPYRROLATE 0.2 MG/ML
INJECTION INTRAMUSCULAR; INTRAVENOUS AS NEEDED
Status: DISCONTINUED | OUTPATIENT
Start: 2019-05-29 | End: 2019-05-29 | Stop reason: SURG

## 2019-05-29 RX ORDER — FENTANYL CITRATE 50 UG/ML
INJECTION, SOLUTION INTRAMUSCULAR; INTRAVENOUS AS NEEDED
Status: DISCONTINUED | OUTPATIENT
Start: 2019-05-29 | End: 2019-05-29 | Stop reason: SURG

## 2019-05-29 RX ORDER — HYDROMORPHONE HYDROCHLORIDE 2 MG/ML
INJECTION, SOLUTION INTRAMUSCULAR; INTRAVENOUS; SUBCUTANEOUS AS NEEDED
Status: DISCONTINUED | OUTPATIENT
Start: 2019-05-29 | End: 2019-05-29 | Stop reason: SURG

## 2019-05-29 RX ORDER — DIPHENHYDRAMINE HYDROCHLORIDE 50 MG/ML
25 INJECTION INTRAMUSCULAR; INTRAVENOUS EVERY 6 HOURS PRN
Status: DISCONTINUED | OUTPATIENT
Start: 2019-05-29 | End: 2019-05-30 | Stop reason: HOSPADM

## 2019-05-29 RX ORDER — PROPOFOL 10 MG/ML
INJECTION, EMULSION INTRAVENOUS AS NEEDED
Status: DISCONTINUED | OUTPATIENT
Start: 2019-05-29 | End: 2019-05-29 | Stop reason: SURG

## 2019-05-29 RX ORDER — SCOLOPAMINE TRANSDERMAL SYSTEM 1 MG/1
PATCH, EXTENDED RELEASE TRANSDERMAL AS NEEDED
Status: DISCONTINUED | OUTPATIENT
Start: 2019-05-29 | End: 2019-05-29 | Stop reason: SURG

## 2019-05-29 RX ORDER — SODIUM CHLORIDE, SODIUM LACTATE, POTASSIUM CHLORIDE, CALCIUM CHLORIDE 600; 310; 30; 20 MG/100ML; MG/100ML; MG/100ML; MG/100ML
50 INJECTION, SOLUTION INTRAVENOUS CONTINUOUS
Status: DISCONTINUED | OUTPATIENT
Start: 2019-05-29 | End: 2019-05-29

## 2019-05-29 RX ORDER — PROMETHAZINE HYDROCHLORIDE 25 MG/ML
12.5 INJECTION, SOLUTION INTRAMUSCULAR; INTRAVENOUS ONCE AS NEEDED
Status: DISCONTINUED | OUTPATIENT
Start: 2019-05-29 | End: 2019-05-29 | Stop reason: HOSPADM

## 2019-05-29 RX ORDER — LIDOCAINE HYDROCHLORIDE AND EPINEPHRINE 5; 5 MG/ML; UG/ML
INJECTION, SOLUTION INFILTRATION; PERINEURAL AS NEEDED
Status: DISCONTINUED | OUTPATIENT
Start: 2019-05-29 | End: 2019-05-29 | Stop reason: HOSPADM

## 2019-05-29 RX ORDER — CEFAZOLIN SODIUM 1 G/50ML
1000 SOLUTION INTRAVENOUS ONCE
Status: COMPLETED | OUTPATIENT
Start: 2019-05-29 | End: 2019-05-29

## 2019-05-29 RX ORDER — CEFAZOLIN SODIUM 1 G/50ML
1000 SOLUTION INTRAVENOUS EVERY 8 HOURS
Status: COMPLETED | OUTPATIENT
Start: 2019-05-29 | End: 2019-05-30

## 2019-05-29 RX ORDER — ONDANSETRON 2 MG/ML
INJECTION INTRAMUSCULAR; INTRAVENOUS AS NEEDED
Status: DISCONTINUED | OUTPATIENT
Start: 2019-05-29 | End: 2019-05-29 | Stop reason: SURG

## 2019-05-29 RX ORDER — NEOSTIGMINE METHYLSULFATE 1 MG/ML
INJECTION INTRAVENOUS AS NEEDED
Status: DISCONTINUED | OUTPATIENT
Start: 2019-05-29 | End: 2019-05-29 | Stop reason: SURG

## 2019-05-29 RX ORDER — EPHEDRINE SULFATE 50 MG/ML
INJECTION INTRAVENOUS AS NEEDED
Status: DISCONTINUED | OUTPATIENT
Start: 2019-05-29 | End: 2019-05-29 | Stop reason: SURG

## 2019-05-29 RX ORDER — DEXAMETHASONE SODIUM PHOSPHATE 10 MG/ML
INJECTION, SOLUTION INTRAMUSCULAR; INTRAVENOUS AS NEEDED
Status: DISCONTINUED | OUTPATIENT
Start: 2019-05-29 | End: 2019-05-29 | Stop reason: SURG

## 2019-05-29 RX ORDER — OXYCODONE HYDROCHLORIDE AND ACETAMINOPHEN 5; 325 MG/1; MG/1
1 TABLET ORAL EVERY 4 HOURS PRN
Status: DISCONTINUED | OUTPATIENT
Start: 2019-05-29 | End: 2019-05-30 | Stop reason: HOSPADM

## 2019-05-29 RX ORDER — ROCURONIUM BROMIDE 10 MG/ML
INJECTION, SOLUTION INTRAVENOUS AS NEEDED
Status: DISCONTINUED | OUTPATIENT
Start: 2019-05-29 | End: 2019-05-29 | Stop reason: SURG

## 2019-05-29 RX ORDER — HYDROMORPHONE HCL/PF 1 MG/ML
0.5 SYRINGE (ML) INJECTION
Status: DISCONTINUED | OUTPATIENT
Start: 2019-05-29 | End: 2019-05-29 | Stop reason: HOSPADM

## 2019-05-29 RX ORDER — HYDROMORPHONE HCL/PF 1 MG/ML
1 SYRINGE (ML) INJECTION EVERY 2 HOUR PRN
Status: DISCONTINUED | OUTPATIENT
Start: 2019-05-29 | End: 2019-05-30 | Stop reason: HOSPADM

## 2019-05-29 RX ORDER — DEXTROSE MONOHYDRATE AND SODIUM CHLORIDE 5; .45 G/100ML; G/100ML
125 INJECTION, SOLUTION INTRAVENOUS CONTINUOUS
Status: DISCONTINUED | OUTPATIENT
Start: 2019-05-29 | End: 2019-05-30 | Stop reason: HOSPADM

## 2019-05-29 RX ORDER — ONDANSETRON 2 MG/ML
4 INJECTION INTRAMUSCULAR; INTRAVENOUS EVERY 6 HOURS PRN
Status: DISCONTINUED | OUTPATIENT
Start: 2019-05-29 | End: 2019-05-30 | Stop reason: HOSPADM

## 2019-05-29 RX ADMIN — SCOPALAMINE 1 PATCH: 1 PATCH, EXTENDED RELEASE TRANSDERMAL at 13:00

## 2019-05-29 RX ADMIN — PROPOFOL 150 MG: 10 INJECTION, EMULSION INTRAVENOUS at 09:44

## 2019-05-29 RX ADMIN — CEFAZOLIN SODIUM 1000 MG: 1 SOLUTION INTRAVENOUS at 13:58

## 2019-05-29 RX ADMIN — NEOSTIGMINE METHYLSULFATE 3 MG: 1 INJECTION INTRAVENOUS at 14:34

## 2019-05-29 RX ADMIN — FENTANYL CITRATE 100 MCG: 50 INJECTION INTRAMUSCULAR; INTRAVENOUS at 10:29

## 2019-05-29 RX ADMIN — ONDANSETRON HYDROCHLORIDE 4 MG: 2 INJECTION, SOLUTION INTRAMUSCULAR; INTRAVENOUS at 20:21

## 2019-05-29 RX ADMIN — ROCURONIUM BROMIDE 50 MG: 10 INJECTION, SOLUTION INTRAVENOUS at 09:44

## 2019-05-29 RX ADMIN — HYDROMORPHONE HYDROCHLORIDE 1 MG: 1 INJECTION, SOLUTION INTRAMUSCULAR; INTRAVENOUS; SUBCUTANEOUS at 20:27

## 2019-05-29 RX ADMIN — HYDROMORPHONE HYDROCHLORIDE 0.5 MG: 1 INJECTION, SOLUTION INTRAMUSCULAR; INTRAVENOUS; SUBCUTANEOUS at 15:32

## 2019-05-29 RX ADMIN — HYDROMORPHONE HYDROCHLORIDE 0.5 MG: 1 INJECTION, SOLUTION INTRAMUSCULAR; INTRAVENOUS; SUBCUTANEOUS at 15:09

## 2019-05-29 RX ADMIN — HYDROMORPHONE HYDROCHLORIDE 0.6 MG: 2 INJECTION, SOLUTION INTRAMUSCULAR; INTRAVENOUS; SUBCUTANEOUS at 14:42

## 2019-05-29 RX ADMIN — SODIUM CHLORIDE, POTASSIUM CHLORIDE, SODIUM LACTATE AND CALCIUM CHLORIDE: 600; 310; 30; 20 INJECTION, SOLUTION INTRAVENOUS at 09:25

## 2019-05-29 RX ADMIN — SODIUM CHLORIDE, POTASSIUM CHLORIDE, SODIUM LACTATE AND CALCIUM CHLORIDE 1000 ML: 600; 310; 30; 20 INJECTION, SOLUTION INTRAVENOUS at 18:19

## 2019-05-29 RX ADMIN — SODIUM CHLORIDE, POTASSIUM CHLORIDE, SODIUM LACTATE AND CALCIUM CHLORIDE: 600; 310; 30; 20 INJECTION, SOLUTION INTRAVENOUS at 13:45

## 2019-05-29 RX ADMIN — ROCURONIUM BROMIDE 10 MG: 10 INJECTION, SOLUTION INTRAVENOUS at 11:10

## 2019-05-29 RX ADMIN — ROCURONIUM BROMIDE 10 MG: 10 INJECTION, SOLUTION INTRAVENOUS at 13:22

## 2019-05-29 RX ADMIN — SODIUM CHLORIDE, POTASSIUM CHLORIDE, SODIUM LACTATE AND CALCIUM CHLORIDE 50 ML/HR: 600; 310; 30; 20 INJECTION, SOLUTION INTRAVENOUS at 08:54

## 2019-05-29 RX ADMIN — CEFAZOLIN SODIUM 1000 MG: 1 SOLUTION INTRAVENOUS at 22:27

## 2019-05-29 RX ADMIN — FENTANYL CITRATE 100 MCG: 50 INJECTION INTRAMUSCULAR; INTRAVENOUS at 09:44

## 2019-05-29 RX ADMIN — DEXTROSE AND SODIUM CHLORIDE 125 ML/HR: 5; 450 INJECTION, SOLUTION INTRAVENOUS at 15:33

## 2019-05-29 RX ADMIN — HYDROMORPHONE HYDROCHLORIDE 0.4 MG: 2 INJECTION, SOLUTION INTRAMUSCULAR; INTRAVENOUS; SUBCUTANEOUS at 12:06

## 2019-05-29 RX ADMIN — FENTANYL CITRATE 50 MCG: 50 INJECTION INTRAMUSCULAR; INTRAVENOUS at 10:27

## 2019-05-29 RX ADMIN — CEFAZOLIN SODIUM 1000 MG: 1 SOLUTION INTRAVENOUS at 09:58

## 2019-05-29 RX ADMIN — GLYCOPYRROLATE 0.4 MG: 0.2 INJECTION, SOLUTION INTRAMUSCULAR; INTRAVENOUS at 14:34

## 2019-05-29 RX ADMIN — DEXTROSE AND SODIUM CHLORIDE 125 ML/HR: 5; 450 INJECTION, SOLUTION INTRAVENOUS at 22:27

## 2019-05-29 RX ADMIN — EPHEDRINE SULFATE 5 MG: 50 INJECTION, SOLUTION INTRAVENOUS at 10:46

## 2019-05-29 RX ADMIN — ROCURONIUM BROMIDE 10 MG: 10 INJECTION, SOLUTION INTRAVENOUS at 13:37

## 2019-05-29 RX ADMIN — ONDANSETRON HYDROCHLORIDE 4 MG: 2 INJECTION, SOLUTION INTRAMUSCULAR; INTRAVENOUS at 14:16

## 2019-05-29 RX ADMIN — DEXAMETHASONE SODIUM PHOSPHATE 4 MG: 10 INJECTION, SOLUTION INTRAMUSCULAR; INTRAVENOUS at 09:54

## 2019-05-29 RX ADMIN — MIDAZOLAM HYDROCHLORIDE 2 MG: 1 INJECTION, SOLUTION INTRAMUSCULAR; INTRAVENOUS at 09:44

## 2019-05-29 RX ADMIN — ROCURONIUM BROMIDE 10 MG: 10 INJECTION, SOLUTION INTRAVENOUS at 12:43

## 2019-05-29 RX ADMIN — HYDROMORPHONE HYDROCHLORIDE 0.5 MG: 1 INJECTION, SOLUTION INTRAMUSCULAR; INTRAVENOUS; SUBCUTANEOUS at 15:21

## 2019-05-30 VITALS
OXYGEN SATURATION: 96 % | TEMPERATURE: 98.7 F | RESPIRATION RATE: 18 BRPM | SYSTOLIC BLOOD PRESSURE: 98 MMHG | DIASTOLIC BLOOD PRESSURE: 50 MMHG | HEART RATE: 88 BPM

## 2019-05-30 PROBLEM — E88.1 LIPODYSTROPHY: Status: ACTIVE | Noted: 2019-05-30

## 2019-05-30 RX ORDER — OXYCODONE HYDROCHLORIDE AND ACETAMINOPHEN 5; 325 MG/1; MG/1
1 TABLET ORAL EVERY 4 HOURS PRN
Qty: 30 TABLET | Refills: 0 | Status: SHIPPED | OUTPATIENT
Start: 2019-05-30 | End: 2019-06-09

## 2019-05-30 RX ORDER — CEPHALEXIN 500 MG/1
500 CAPSULE ORAL EVERY 8 HOURS SCHEDULED
Qty: 21 CAPSULE | Refills: 0 | Status: SHIPPED | OUTPATIENT
Start: 2019-05-30 | End: 2019-06-06

## 2019-05-30 RX ADMIN — HYDROMORPHONE HYDROCHLORIDE 1 MG: 1 INJECTION, SOLUTION INTRAMUSCULAR; INTRAVENOUS; SUBCUTANEOUS at 00:28

## 2019-05-30 RX ADMIN — CEFAZOLIN SODIUM 1000 MG: 1 SOLUTION INTRAVENOUS at 05:16

## 2019-05-30 RX ADMIN — OXYCODONE HYDROCHLORIDE AND ACETAMINOPHEN 1 TABLET: 5; 325 TABLET ORAL at 09:43

## 2019-05-30 RX ADMIN — HYDROMORPHONE HYDROCHLORIDE 1 MG: 1 INJECTION, SOLUTION INTRAMUSCULAR; INTRAVENOUS; SUBCUTANEOUS at 05:32

## 2019-05-30 RX ADMIN — DEXTROSE AND SODIUM CHLORIDE 125 ML/HR: 5; 450 INJECTION, SOLUTION INTRAVENOUS at 06:25

## 2019-06-14 ENCOUNTER — REMOTE DEVICE CLINIC VISIT (OUTPATIENT)
Dept: CARDIOLOGY CLINIC | Facility: CLINIC | Age: 39
End: 2019-06-14
Payer: COMMERCIAL

## 2019-06-14 DIAGNOSIS — Z95.810 PRESENCE OF AUTOMATIC CARDIOVERTER/DEFIBRILLATOR (AICD): Primary | ICD-10-CM

## 2019-06-14 PROCEDURE — 93296 REM INTERROG EVL PM/IDS: CPT | Performed by: INTERNAL MEDICINE

## 2019-06-14 PROCEDURE — 93295 DEV INTERROG REMOTE 1/2/MLT: CPT | Performed by: INTERNAL MEDICINE

## 2019-08-17 ENCOUNTER — TRANSCRIBE ORDERS (OUTPATIENT)
Dept: LAB | Facility: CLINIC | Age: 39
End: 2019-08-17

## 2019-08-17 ENCOUNTER — HOSPITAL ENCOUNTER (OUTPATIENT)
Dept: CT IMAGING | Facility: HOSPITAL | Age: 39
Discharge: HOME | End: 2019-08-17
Payer: COMMERCIAL

## 2019-08-17 DIAGNOSIS — R07.1 PAIN OF ANTERIOR CHEST WALL WITH RESPIRATION: ICD-10-CM

## 2019-08-17 PROCEDURE — 71260 CT THORAX DX C+: CPT

## 2019-08-17 RX ADMIN — IOHEXOL 85 ML: 350 INJECTION, SOLUTION INTRAVENOUS at 11:20

## 2019-08-26 ENCOUNTER — OFFICE VISIT (OUTPATIENT)
Dept: INTERNAL MEDICINE CLINIC | Facility: CLINIC | Age: 39
End: 2019-08-26
Payer: COMMERCIAL

## 2019-08-26 VITALS
TEMPERATURE: 98.2 F | SYSTOLIC BLOOD PRESSURE: 114 MMHG | HEART RATE: 60 BPM | WEIGHT: 141 LBS | BODY MASS INDEX: 26.62 KG/M2 | DIASTOLIC BLOOD PRESSURE: 68 MMHG | OXYGEN SATURATION: 99 % | RESPIRATION RATE: 16 BRPM | HEIGHT: 61 IN

## 2019-08-26 DIAGNOSIS — E66.3 OVERWEIGHT (BMI 25.0-29.9): ICD-10-CM

## 2019-08-26 DIAGNOSIS — I42.0 DILATED CARDIOMYOPATHY (HCC): ICD-10-CM

## 2019-08-26 DIAGNOSIS — Z00.00 ENCOUNTER FOR PREVENTIVE CARE: ICD-10-CM

## 2019-08-26 DIAGNOSIS — R07.1 PAIN OF ANTERIOR CHEST WALL WITH RESPIRATION: Primary | ICD-10-CM

## 2019-08-26 PROCEDURE — 99214 OFFICE O/P EST MOD 30 MIN: CPT | Performed by: NURSE PRACTITIONER

## 2019-08-26 PROCEDURE — 1036F TOBACCO NON-USER: CPT | Performed by: NURSE PRACTITIONER

## 2019-08-26 PROCEDURE — 3008F BODY MASS INDEX DOCD: CPT | Performed by: NURSE PRACTITIONER

## 2019-08-26 NOTE — PROGRESS NOTES
Assessment/Plan:    Cardiomyopathy (HCC)  Stable on carvedilol and lisinopril  Continue with routine cardiac f/u  Pain of anterior chest wall with respiration  Pt continues with symptoms despite unremarkable exam and chest CT  Did attempt to reassure that this does not appear to be anything concerning given her normal testing  She is stable and otherwise asymptomatic with normal vitals  Pt referred to pulmonology for evaluation of symptoms  Overweight (BMI 25 0-29  9)  BMI Counseling: Body mass index is 26 64 kg/m²  Discussed the patient's BMI with her  The BMI is above average  BMI counseling and education was provided to the patient  Nutrition recommendations include decreasing overall calorie intake and 3-5 servings of fruits/vegetables daily  Diagnoses and all orders for this visit:    Pain of anterior chest wall with respiration  -     Ambulatory referral to Pulmonology; Future    Encounter for preventive care  -     Lipid panel; Future  -     Comprehensive metabolic panel; Future  -     CBC and differential; Future  -     Hemoglobin A1C; Future    Dilated cardiomyopathy (HCC)    Overweight (BMI 25 0-29  9)          Subjective:      Patient ID: Jordin Marquez is a 37 y o  female  Patient is a 47-year-old female here for follow-up  Past medical history of cardiomyopathy with ICD  At her visit in February, we discussed persistent R chest wall pain with inspiration and expiration  This was first noted nearly a year ago and she was evaluated by her cardiologist with no abnormal findings on CTA  There was an incidental finding of a 3 mm RUL nodule which was not likely related to her symptoms  A 6 month f/u CT was ordered for further reassessment, and this was done this month  The 3 mm nodule was no longer present  There were 1-2 mm nodules noted that appeared to be stable findings, unchanged from prior imaging  Pt continues with this mild pain with respirations    No palpitations or shortness of breath, denies dizziness, headache, or blurred vision  Appetite is normal, no N/V/D  The following portions of the patient's history were reviewed and updated as appropriate: allergies, current medications, past family history, past medical history, past social history, past surgical history and problem list     Review of Systems   Constitutional: Negative for activity change, appetite change, chills, fatigue, fever and unexpected weight change  HENT: Negative for hearing loss  Eyes: Negative for visual disturbance  Respiratory: Negative for cough, chest tightness, shortness of breath and wheezing  Cardiovascular: Positive for chest pain  Negative for palpitations and leg swelling  Gastrointestinal: Negative for constipation, diarrhea, nausea and vomiting  Genitourinary: Negative for dysuria and frequency  Musculoskeletal: Negative for arthralgias and myalgias  Allergic/Immunologic: Negative for environmental allergies  Neurological: Negative for dizziness, weakness, numbness and headaches  Psychiatric/Behavioral: Negative for sleep disturbance  The patient is not nervous/anxious  Objective:      /68 (Patient Position: Sitting, Cuff Size: Standard)   Pulse 60   Temp 98 2 °F (36 8 °C)   Resp 16   Ht 5' 1" (1 549 m)   Wt 64 kg (141 lb)   SpO2 99%   BMI 26 64 kg/m²          Physical Exam   Constitutional: She is oriented to person, place, and time  Vital signs are normal  She appears well-developed and well-nourished  She is cooperative  HENT:   Right Ear: Hearing normal    Left Ear: Hearing normal    Eyes: Conjunctivae are normal    Cardiovascular: Normal rate, regular rhythm, normal heart sounds and intact distal pulses  No murmur heard  Pulmonary/Chest: Effort normal and breath sounds normal  No accessory muscle usage  No respiratory distress  She has no decreased breath sounds  She has no wheezes  She has no rhonchi  She has no rales  Abdominal: Normal appearance  There is no tenderness  Musculoskeletal: She exhibits no edema  Lymphadenopathy:        Head (right side): No submental, no submandibular, no tonsillar, no preauricular, no posterior auricular and no occipital adenopathy present  Head (left side): No submental, no submandibular, no tonsillar, no preauricular, no posterior auricular and no occipital adenopathy present  She has no cervical adenopathy  Neurological: She is alert and oriented to person, place, and time  Skin: Skin is warm, dry and intact  Psychiatric: She has a normal mood and affect  Her speech is normal and behavior is normal  Judgment and thought content normal  Cognition and memory are normal    Vitals reviewed

## 2019-08-26 NOTE — ASSESSMENT & PLAN NOTE
BMI Counseling: Body mass index is 26 64 kg/m²  Discussed the patient's BMI with her  The BMI is above average  BMI counseling and education was provided to the patient  Nutrition recommendations include decreasing overall calorie intake and 3-5 servings of fruits/vegetables daily

## 2019-08-26 NOTE — ASSESSMENT & PLAN NOTE
Pt continues with symptoms despite unremarkable exam and chest CT  Did attempt to reassure that this does not appear to be anything concerning given her normal testing  She is stable and otherwise asymptomatic with normal vitals  Pt referred to pulmonology for evaluation of symptoms

## 2019-09-03 ENCOUNTER — OFFICE VISIT (OUTPATIENT)
Dept: PULMONOLOGY | Facility: CLINIC | Age: 39
End: 2019-09-03
Payer: COMMERCIAL

## 2019-09-03 VITALS
DIASTOLIC BLOOD PRESSURE: 70 MMHG | SYSTOLIC BLOOD PRESSURE: 116 MMHG | BODY MASS INDEX: 26.43 KG/M2 | HEART RATE: 75 BPM | WEIGHT: 140 LBS | OXYGEN SATURATION: 99 % | RESPIRATION RATE: 16 BRPM | HEIGHT: 61 IN | TEMPERATURE: 98 F

## 2019-09-03 DIAGNOSIS — R07.1 PAIN OF ANTERIOR CHEST WALL WITH RESPIRATION: ICD-10-CM

## 2019-09-03 PROCEDURE — 99244 OFF/OP CNSLTJ NEW/EST MOD 40: CPT | Performed by: INTERNAL MEDICINE

## 2019-09-03 RX ORDER — PREDNISONE 20 MG/1
40 TABLET ORAL DAILY
Qty: 10 TABLET | Refills: 0 | Status: SHIPPED | OUTPATIENT
Start: 2019-09-03 | End: 2019-10-28

## 2019-09-03 NOTE — PROGRESS NOTES
Pulmonary Outpatient Consultation Note   Dorian Joyce 37 y o  female MRN: [id number]  9/3/2019    Referring provider:   JUAN LUIS Petit    Assessment/Plan:      Chest pain on breathing   The etiology of right-sided chest pain is not entirely clear  There is no evidence of pleural disease or underlying pulmonary process that would be contributing to the pain  The pain is not reproducible  The pain has been unrelieved with anti inflammatory agents  Though it would be unusual to have an isolated chest wall abnormality related to rheumatologic condition, I will perform screening labs including NICO, rheumatoid factor, CPK and C reactive protein  As a therapeutic trial, I will also give a short course of prednisone  I will call her with blood work results and to see how her pain is affected by the prednisone  Visit orders:    Diagnoses and all orders for this visit:    Pain of anterior chest wall with respiration  -     Ambulatory referral to Pulmonology  -     NICO Screen w/ Reflex to Titer/Pattern; Future  -     RF Screen w/ Reflex to Titer; Future  -     C-reactive protein; Future  -     CK (with reflex to MB); Future  -     predniSONE 20 mg tablet; Take 2 tablets (40 mg total) by mouth daily        History of Present Illness   HPI:  Dorian Joyce is a 37 y o  female who  Is here today for evaluation regarding right-sided chest pain  One year ago, she presented to the emergency department with a relatively sudden onset of right anterior chest wall pain  The pain radiates into the back  Patient states that the pain is constantly there, with episodic flare-ups  The pain is sometimes worse when lying on her right side  She has no associated neck or shoulder pain  She denies trauma to this area  She finds it is difficult to take a deep breath when she is experiencing the pain  Otherwise, she is not dyspneic  Tylenol and Aleve have been ineffective at treating the pain   She has no associated cough, wheeze or sputum production  She does not have any prior existing lung disease including asthma or COPD  She is a lifelong nonsmoker  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  She has an ICD in place  Extensive cardiac evaluation has not revealed any etiology for the chest pain  She has undergone 2 separate chest CT examinations  The first was in January of this year which showed a 3 mm right upper lobe pulmonary nodule  No evidence of pulmonary embolism  On follow-up CT,  Nodule is no longer appreciated  She denies arthralgias or myalgias other than the chest pain  She denies rheumatologic conditions  Her weight is stable  She denies fever, chills or sweats  Review of Systems   Constitutional: Negative for chills, fever and unexpected weight change  HENT: Negative for postnasal drip and sore throat  Eyes: Negative for visual disturbance  Respiratory:        As noted in HPI   Cardiovascular: Negative for chest pain  Gastrointestinal: Negative for abdominal pain, diarrhea and vomiting  Denies heartburn   Genitourinary: Negative for difficulty urinating  Musculoskeletal: Negative for arthralgias and back pain  Skin: Negative for rash  Neurological: Negative for headaches  Hematological: Negative for adenopathy  Psychiatric/Behavioral: Negative  All other systems reviewed and are negative          Historical Information   Past Medical History:   Diagnosis Date    Allergic rhinitis     Anxiety     Atrial fibrillation (HCC)     paroxysmal    Bunion 1985    Candidal vulvovaginitis     Cardiomyopathy ([address] )     Chest pain     CHF (congestive heart failure) (HCC)     chronic systolic    Coronary artery disease     Herpes simplex     Irregular heart beat     LBBB (left bundle branch block)     Normal delivery     2005 son, 2008 daughter    Pacemaker      Past Surgical History:   Procedure Laterality Date    CARDIAC DEFIBRILLATOR PLACEMENT      MO EXCISE EXCESS SKIN TISSUE,ABDOMEN, ADD-ON N/A 5/29/2019    Procedure: CIRCUMFERENTIAL ABDOMINOPLASTY;  Surgeon: Nadia Gomez MD;  Location: [address] [place] [address];  Service: Plastics    TUBAL LIGATION  2009     Family History   Problem Relation Age of Onset    Diabetes Father     Hypertension Father     Heart disease Father     Hyperlipidemia Father     Hypertension Mother     No Known Problems Brother     No Known Problems Brother     No Known Problems Son     No Known Problems Daughter        Occupational History:  Works at a desk job  No chemical exposures    Social History     Tobacco Use   Smoking Status Never Smoker   Smokeless Tobacco Never Used   Tobacco Comment    history of second hand smoke exposure as a child       Meds/Allergies     Current Outpatient Medications:     carvedilol (COREG) 25 mg tablet, TAKE 1 TABLET TWICE DAILY, Disp: 180 tablet, Rfl: 3    lisinopril (ZESTRIL) 5 mg tablet, TAKE 1 TABLET TWICE DAILY, Disp: 180 tablet, Rfl: 3    predniSONE 20 mg tablet, Take 2 tablets (40 mg total) by mouth daily, Disp: 10 tablet, Rfl: 0  No Known Allergies    Vitals: Blood pressure 116/70, pulse 75, temperature 98 °F (36 7 °C), temperature source Tympanic, resp  rate 16, height 5' 1" (1 549 m), weight 63 5 kg (140 lb), SpO2 99 %, not currently breastfeeding , Body mass index is 26 45 kg/m²  Oxygen Therapy  SpO2: 99 %    Physical Exam   Constitutional: She is oriented to person, place, and time  No distress  HENT:   Head: Normocephalic  Mouth/Throat: No oropharyngeal exudate  Eyes: Pupils are equal, round, and reactive to light  No scleral icterus  Neck: Neck supple  No JVD present  Cardiovascular: Normal rate and regular rhythm  Pulmonary/Chest: No respiratory distress  She has no wheezes  She has no rales  She exhibits no tenderness  Abdominal: Soft  There is no tenderness  Musculoskeletal: She exhibits no edema  Lymphadenopathy:     She has no cervical adenopathy  Neurological: She is alert and oriented to person, place, and time  Skin: Skin is warm and dry  Psychiatric: She has a normal mood and affect  Labs: I have personally reviewed pertinent lab results  Lab Results   Component Value Date    WBC 5 12 05/09/2019    HGB 12 7 05/09/2019    HCT 38 8 05/09/2019    MCV 88 05/09/2019     05/09/2019     Lab Results   Component Value Date    CALCIUM 8 2 (L) 03/05/2019    K 4 3 03/05/2019    CO2 27 03/05/2019     03/05/2019    BUN 14 03/05/2019    CREATININE 0 51 (L) 03/05/2019     No results found for: IGE  Lab Results   Component Value Date    ALT 21 03/05/2019    AST 12 03/05/2019    ALKPHOS 41 (L) 03/05/2019       Imaging and other studies: I have personally reviewed pertinent reports  and I have personally reviewed pertinent films in PACS   CT of the chest from January 2019 and August 2019 are both reviewed on the [facility]  There is no pulmonary embolism  No pleural effusion  On the initial study, there was a 3 mm right upper lobe nodule, not visualized on most recent study

## 2019-09-03 NOTE — ASSESSMENT & PLAN NOTE
The etiology of right-sided chest pain is not entirely clear  There is no evidence of pleural disease or underlying pulmonary process that would be contributing to the pain  The pain is not reproducible  The pain has been unrelieved with anti inflammatory agents  Though it would be unusual to have an isolated chest wall abnormality related to rheumatologic condition, I will perform screening labs including NICO, rheumatoid factor, CPK and C reactive protein  As a therapeutic trial, I will also give a short course of prednisone  I will call her with blood work results and to see how her pain is affected by the prednisone

## 2019-09-06 ENCOUNTER — APPOINTMENT (OUTPATIENT)
Dept: LAB | Facility: CLINIC | Age: 39
End: 2019-09-06
Payer: COMMERCIAL

## 2019-09-06 DIAGNOSIS — R07.1 PAIN OF ANTERIOR CHEST WALL WITH RESPIRATION: ICD-10-CM

## 2019-09-06 LAB
CK SERPL-CCNC: 41 U/L (ref 26–192)
CRP SERPL QL: <3 MG/L
RHEUMATOID FACT SER QL LA: NEGATIVE

## 2019-09-06 PROCEDURE — 82550 ASSAY OF CK (CPK): CPT

## 2019-09-06 PROCEDURE — 86430 RHEUMATOID FACTOR TEST QUAL: CPT

## 2019-09-06 PROCEDURE — 36415 COLL VENOUS BLD VENIPUNCTURE: CPT

## 2019-09-06 PROCEDURE — 86140 C-REACTIVE PROTEIN: CPT

## 2019-09-06 PROCEDURE — 86038 ANTINUCLEAR ANTIBODIES: CPT

## 2019-09-09 LAB — RYE IGE QN: NEGATIVE

## 2019-09-16 ENCOUNTER — REMOTE DEVICE CLINIC VISIT (OUTPATIENT)
Dept: CARDIOLOGY CLINIC | Facility: CLINIC | Age: 39
End: 2019-09-16
Payer: COMMERCIAL

## 2019-09-16 DIAGNOSIS — Z95.810 PRESENCE OF AUTOMATIC CARDIOVERTER/DEFIBRILLATOR (AICD): Primary | ICD-10-CM

## 2019-09-16 PROCEDURE — 93295 DEV INTERROG REMOTE 1/2/MLT: CPT | Performed by: INTERNAL MEDICINE

## 2019-09-16 PROCEDURE — 93297 REM INTERROG DEV EVAL ICPMS: CPT | Performed by: INTERNAL MEDICINE

## 2019-09-16 PROCEDURE — 93296 REM INTERROG EVL PM/IDS: CPT | Performed by: INTERNAL MEDICINE

## 2019-09-16 NOTE — PROGRESS NOTES
Results for orders placed or performed in visit on 09/16/19   Cardiac EP device report    Narrative    CRT-D  CARELINK TRANSMISSION: BATTERY VOLTAGE ADEQUATE  (4 4 YRS) AP 1%  98%  ALL AVAILABLE LEAD PARAMETERS WITHIN NORMAL LIMITS  NO SIGNIFICANT HIGH RATE EPISODES  VENTRICULAR SENSE EPISODES NOTED  OPTI-VOL WITHIN NORMAL LIMITS  NORMAL DEVICE FUNCTION  ---HEIN

## 2019-10-28 ENCOUNTER — OFFICE VISIT (OUTPATIENT)
Dept: CARDIOLOGY CLINIC | Facility: CLINIC | Age: 39
End: 2019-10-28
Payer: COMMERCIAL

## 2019-10-28 VITALS
BODY MASS INDEX: 27.13 KG/M2 | HEART RATE: 72 BPM | HEIGHT: 61 IN | WEIGHT: 143.7 LBS | SYSTOLIC BLOOD PRESSURE: 108 MMHG | DIASTOLIC BLOOD PRESSURE: 64 MMHG

## 2019-10-28 DIAGNOSIS — I42.0 DILATED CARDIOMYOPATHY (HCC): ICD-10-CM

## 2019-10-28 DIAGNOSIS — I44.7 LEFT BUNDLE-BRANCH BLOCK: Primary | ICD-10-CM

## 2019-10-28 DIAGNOSIS — R07.1 CHEST PAIN ON BREATHING: ICD-10-CM

## 2019-10-28 PROCEDURE — 99213 OFFICE O/P EST LOW 20 MIN: CPT | Performed by: INTERNAL MEDICINE

## 2019-10-28 RX ORDER — PREDNISONE 10 MG/1
40 TABLET ORAL DAILY
Refills: 0 | COMMUNITY
Start: 2019-09-03 | End: 2019-10-28

## 2019-10-29 NOTE — PROGRESS NOTES
Cardiology Follow Up    Triston León  1980  [id number]  [facility] [place], [place] [facility] [place]  [address] [place]  [place] [address]  [phone]    1  Left bundle-branch block     2  Dilated cardiomyopathy ([address] )     3  Chest pain on breathing           Discussion/Summary: All of her assessed cardiac problems are stable  I have reviewed her medications and made no changes  No cardiac testing is ordered  RTO 1 year, repeat echo at that time  Interval History: She has not had any cardiac problems since her last office visit  She continues to have her atypical R sided CP  She is active and denies exertional CP  She has a LBBB, non ischemic CM  Last echo 12/2018 - EF 45%  She has a BiV ICD which is functioning normally  Patient Active Problem List   Diagnosis    Acute bronchitis    Acute rhinitis    Acute sinusitis    Anxiety    Cardiomyopathy ([address] )    Left bundle-branch block    Menorrhagia with regular cycle    Mitral regurgitation    Chest pain on breathing    Screening for heart disease    Screening for diabetes mellitus    Lipodystrophy    Overweight (BMI 25 0-29  9)     Past Medical History:   Diagnosis Date    Allergic rhinitis     Anxiety     Atrial fibrillation (HCC)     paroxysmal    Bunion 1985    Candidal vulvovaginitis     Cardiomyopathy ([address] )     Chest pain     CHF (congestive heart failure) (HCC)     chronic systolic    Coronary artery disease     Herpes simplex     Irregular heart beat     LBBB (left bundle branch block)     Normal delivery     2005 son, 2008 daughter    Pacemaker      Social History     Socioeconomic History    Marital status: /Civil Union     Spouse name: Not on file    Number of children: Not on file    Years of education: Not on file    Highest education level: Not on file   Occupational History    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   Social Needs    Financial resource strain: Not on file    Food insecurity:     Worry: Not on file     Inability: Not on file    Transportation needs:     Medical: Not on file     Non-medical: Not on file   Tobacco Use    Smoking status: Never Smoker    Smokeless tobacco: Never Used    Tobacco comment: history of second hand smoke exposure as a child   Substance and Sexual Activity    Alcohol use: Yes     Frequency: 2-4 times a month     Drinks per session: 1 or 2     Binge frequency: Never     Comment: social    Drug use: No    Sexual activity: Yes     Partners: Male     Birth control/protection: Female Sterilization   Lifestyle    Physical activity:     Days per week: Not on file     Minutes per session: Not on file    Stress: Not on file   Relationships    Social connections:     Talks on phone: Not on file     Gets together: Not on file     Attends Druze service: Not on file     Active member of club or organization: Not on file     Attends meetings of clubs or organizations: Not on file     Relationship status: Not on file    Intimate partner violence:     Fear of current or ex partner: Not on file     Emotionally abused: Not on file     Physically abused: Not on file     Forced sexual activity: Not on file   Other Topics Concern    Not on file   Social History Narrative    Lives with her  and 2 children    Works full time     Exercises 4-5 times per week    Reports a healthy/balanced diet      Family History   Problem Relation Age of Onset    Diabetes Father     Hypertension Father     Heart disease Father     Hyperlipidemia Father     Hypertension Mother     No Known Problems Brother     No Known Problems Brother     No Known Problems Son     No Known Problems Daughter      Past Surgical History:   Procedure Laterality Date    CARDIAC DEFIBRILLATOR PLACEMENT      [place] EXCISE EXCESS SKIN TISSUE,ABDOMEN, ADD-ON N/A 5/29/2019    Procedure: CIRCUMFERENTIAL ABDOMINOPLASTY;  Surgeon: Liliya Torre MD;  Location: [address] [place] [address] OR;  Service: Plastics    TUBAL LIGATION  2009       Current Outpatient Medications:     carvedilol (COREG) 25 mg tablet, TAKE 1 TABLET TWICE DAILY, Disp: 180 tablet, Rfl: 3    lisinopril (ZESTRIL) 5 mg tablet, TAKE 1 TABLET TWICE DAILY, Disp: 180 tablet, Rfl: 3  No Known Allergies  Vitals:    10/28/19 1631   BP: 108/64   BP Location: Left arm   Patient Position: Sitting   Cuff Size: Standard   Pulse: 72   Weight: 65 2 kg (143 lb 11 2 oz)   Height: 5' 1" (1 549 m)     Weight (last 2 days)     Date/Time   Weight    10/28/19 1631   65 2 (143 7)             Blood pressure 108/64, pulse 72, height 5' 1" (1 549 m), weight 65 2 kg (143 lb 11 2 oz), not currently breastfeeding , Body mass index is 27 15 kg/m²  Labs:  Appointment on 09/06/2019   Component Date Value    NICO 09/06/2019 Negative     Rheumatoid Factor 09/06/2019 Negative     CRP 09/06/2019 <3 0     Total CK 09/06/2019 41    Admission on 05/29/2019, Discharged on 05/30/2019   Component Date Value    EXT Preg Test, Ur 05/29/2019 Negative     Case Report 05/29/2019                      Value:Surgical Pathology Report                         Case: [id number]                                   Authorizing Provider:  Glen Rothman MD         Collected:           05/29/2019 1342              Ordering Location:     [facility] Received:            05/29/2019 [facility] Operating Room                                                         Pathologist:           Navid Flores MD                                                          Specimen:    [address] [facility] [address], CIRCUMFERENTIAL ABDOMINOPLASTY                                                  Final Diagnosis 05/29/2019                      Value: This result contains rich text formatting which cannot be displayed here   Additional Information 05/29/2019                      Value: This result contains rich text formatting which cannot be displayed here  Elena Mtz Gross Description 05/29/2019                      Value: This result contains rich text formatting which cannot be displayed here  Appointment on 05/09/2019   Component Date Value    WBC 05/09/2019 5 12     RBC 05/09/2019 4 42     Hemoglobin 05/09/2019 12 7     Hematocrit 05/09/2019 38 8     MCV 05/09/2019 88     MCH 05/09/2019 28 7     MCHC 05/09/2019 32 7     RDW 05/09/2019 12 1     MPV 05/09/2019 11 3     Platelets 53/80/9582 182     nRBC 05/09/2019 0     Neutrophils Relative 05/09/2019 42*    Immat GRANS % 05/09/2019 0     Lymphocytes Relative 05/09/2019 36     Monocytes Relative 05/09/2019 15*    Eosinophils Relative 05/09/2019 6     Basophils Relative 05/09/2019 1     Neutrophils Absolute 05/09/2019 2 19     Immature Grans Absolute 05/09/2019 0 01     Lymphocytes Absolute 05/09/2019 1 82     Monocytes Absolute 05/09/2019 0 78     Eosinophils Absolute 05/09/2019 0 29     Basophils Absolute 05/09/2019 0 03     Protime 05/09/2019 13 4     INR 05/09/2019 1 05     PTT 05/09/2019 28      Imaging: No results found  Review of Systems:  Review of Systems   Constitutional: Negative for diaphoresis, fatigue, fever and unexpected weight change  HENT: Negative  Respiratory: Negative for cough, shortness of breath and wheezing  Cardiovascular: Positive for chest pain  Negative for palpitations and leg swelling  Gastrointestinal: Negative for abdominal pain, diarrhea and nausea  Musculoskeletal: Negative for gait problem and myalgias  Skin: Negative for rash  Neurological: Negative for dizziness and numbness  Psychiatric/Behavioral: Negative  Physical Exam:  Physical Exam   Constitutional: She is oriented to person, place, and time  She appears well-developed and well-nourished  HENT:   Head: Normocephalic and atraumatic  Eyes: Pupils are equal, round, and reactive to light  Neck: Normal range of motion  Neck supple  No JVD present  Cardiovascular: Regular rhythm, S1 normal, S2 normal and normal pulses  Pulses:       Carotid pulses are 2+ on the right side, and 2+ on the left side  Pulmonary/Chest: Effort normal and breath sounds normal  She has no wheezes  She has no rales  Abdominal: Soft  Bowel sounds are normal  There is no tenderness  Musculoskeletal: Normal range of motion  She exhibits no edema or tenderness  Neurological: She is alert and oriented to person, place, and time  She has normal reflexes  No cranial nerve deficit  Skin: Skin is warm  Psychiatric: She has a normal mood and affect

## 2019-11-17 DIAGNOSIS — I42.0 DILATED CARDIOMYOPATHY (HCC): ICD-10-CM

## 2019-11-18 RX ORDER — LISINOPRIL 5 MG/1
TABLET ORAL
Qty: 180 TABLET | Refills: 3 | Status: SHIPPED | OUTPATIENT
Start: 2019-11-18 | End: 2020-12-23 | Stop reason: SDUPTHER

## 2019-11-18 RX ORDER — CARVEDILOL 25 MG/1
TABLET ORAL
Qty: 180 TABLET | Refills: 3 | Status: SHIPPED | OUTPATIENT
Start: 2019-11-18 | End: 2020-12-23 | Stop reason: SDUPTHER

## 2019-12-13 ENCOUNTER — IN-CLINIC DEVICE VISIT (OUTPATIENT)
Dept: CARDIOLOGY CLINIC | Facility: CLINIC | Age: 39
End: 2019-12-13
Payer: COMMERCIAL

## 2019-12-13 DIAGNOSIS — Z95.810 AICD (AUTOMATIC CARDIOVERTER/DEFIBRILLATOR) PRESENT: Primary | ICD-10-CM

## 2019-12-13 PROCEDURE — 93284 PRGRMG EVAL IMPLANTABLE DFB: CPT | Performed by: INTERNAL MEDICINE

## 2019-12-13 NOTE — PROGRESS NOTES
Results for orders placed or performed in visit on 12/13/19   Cardiac EP device report    Narrative    CRT-D  DEVICE INTERROGATED IN THE [facility] OFFICE: BATTERY VOLTAGE ADEQUATE  AP 0%  98% VSRP 1%  ALL AVAILABLE LEAD PARAMETERS WITHIN NORMAL LIMITS  NO SIGNIFICANT HIGH RATE EPISODES  OPTI-VOL WITHIN NORMAL LIMITS  NO PROGRAMMING CHANGES MADE TO DEVICE PARAMETERS  NORMAL DEVICE FUNCTION   NC

## 2020-02-10 ENCOUNTER — ANNUAL EXAM (OUTPATIENT)
Dept: OBGYN CLINIC | Facility: CLINIC | Age: 40
End: 2020-02-10
Payer: COMMERCIAL

## 2020-02-10 VITALS
HEIGHT: 61 IN | DIASTOLIC BLOOD PRESSURE: 60 MMHG | BODY MASS INDEX: 26.43 KG/M2 | WEIGHT: 140 LBS | SYSTOLIC BLOOD PRESSURE: 92 MMHG

## 2020-02-10 DIAGNOSIS — Z01.419 ENCOUNTER FOR GYNECOLOGICAL EXAMINATION (GENERAL) (ROUTINE) WITHOUT ABNORMAL FINDINGS: Primary | ICD-10-CM

## 2020-02-10 DIAGNOSIS — Z12.31 ENCOUNTER FOR SCREENING MAMMOGRAM FOR MALIGNANT NEOPLASM OF BREAST: ICD-10-CM

## 2020-02-10 PROCEDURE — 3074F SYST BP LT 130 MM HG: CPT | Performed by: OBSTETRICS & GYNECOLOGY

## 2020-02-10 PROCEDURE — 3078F DIAST BP <80 MM HG: CPT | Performed by: OBSTETRICS & GYNECOLOGY

## 2020-02-10 PROCEDURE — 3008F BODY MASS INDEX DOCD: CPT | Performed by: OBSTETRICS & GYNECOLOGY

## 2020-02-10 PROCEDURE — S0612 ANNUAL GYNECOLOGICAL EXAMINA: HCPCS | Performed by: OBSTETRICS & GYNECOLOGY

## 2020-02-10 NOTE — PROGRESS NOTES
Alessandra Leo Jimmy  1980      CC:  Yearly exam    S:  37 y o  female here for yearly exam  Her cycles are regular, can be on the heavier side, not crampy  LMP 1/17/20  Can soak a tampon in about 1 5-2 h on her one heavy day a month  Since has last visit she underwent circumferential abdominoplasty  (5/19)  Very happy with decision to have this done  History of postpartum cardiomyopathy - has ICD in place  Sexual activity: She is sexually active without pain, bleeding or dryness    Has a son 8yo, and daughter 11yo  Contraception: She uses tubal for contraception  Last Pap 2/1/2018 - Normal Cytology, Negative HPV  Last Mammo 2/25/19 - benign, 3d    We reviewed ASCCP guidelines for Pap testing today       Family hx of breast cancer: no  Family hx of ovarian cancer: no  Family hx of colon cancer: no      Current Outpatient Medications:     carvedilol (COREG) 25 mg tablet, TAKE 1 TABLET TWICE DAILY, Disp: 180 tablet, Rfl: 3    lisinopril (ZESTRIL) 5 mg tablet, TAKE 1 TABLET TWICE DAILY, Disp: 180 tablet, Rfl: 3  Social History     Socioeconomic History    Marital status: /Civil Union     Spouse name: Not on file    Number of children: Not on file    Years of education: Not on file    Highest education level: Not on file   Occupational History    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   Social Needs    Financial resource strain: Not on file    Food insecurity:     Worry: Not on file     Inability: Not on file    Transportation needs:     Medical: Not on file     Non-medical: Not on file   Tobacco Use    Smoking status: Never Smoker    Smokeless tobacco: Never Used    Tobacco comment: history of second hand smoke exposure as a child   Substance and Sexual Activity    Alcohol use: Yes     Frequency: 2-4 times a month     Drinks per session: 1 or 2     Binge frequency: Never     Comment: social    Drug use: No    Sexual activity: Yes     Partners: Male     Birth control/protection: Female Sterilization   Lifestyle    Physical activity:     Days per week: Not on file     Minutes per session: Not on file    Stress: Not on file   Relationships    Social connections:     Talks on phone: Not on file     Gets together: Not on file     Attends Quaker service: Not on file     Active member of club or organization: Not on file     Attends meetings of clubs or organizations: Not on file     Relationship status: Not on file    Intimate partner violence:     Fear of current or ex partner: Not on file     Emotionally abused: Not on file     Physically abused: Not on file     Forced sexual activity: Not on file   Other Topics Concern    Not on file   Social History Narrative    Lives with her  and 2 children    Works full time     Exercises 4-5 times per week    Reports a healthy/balanced diet     Family History   Problem Relation Age of Onset    Diabetes Father     Hypertension Father     Heart disease Father     Hyperlipidemia Father     Hypertension Mother     No Known Problems Brother     No Known Problems Brother     No Known Problems Son     No Known Problems Daughter       Past Medical History:   Diagnosis Date    Allergic rhinitis     Anxiety     Atrial fibrillation ([address] )     paroxysmal    Bunion 1985    Candidal vulvovaginitis     Cardiomy[facility] [address]     Chest pain     CHF (congestive heart failure) ([facility] )     chronic systolic    Coronary artery disease     Herpes simplex     Irregular heart beat     LBBB (left bundle branch block)     Normal delivery     2005 son, 2008 daughter    Pacemaker         Review of Systems   Respiratory: Negative  Cardiovascular: Negative  Gastrointestinal: Negative for constipation and diarrhea  Genitourinary: Negative for difficulty urinating, pelvic pain, vaginal bleeding, vaginal discharge, itching or odor      O:  Blood pressure 92/60, height 5' 1" (1 549 m), weight 63 5 kg (140 lb), last menstrual period 01/17/2020, not currently breastfeeding  Patient appears well and is not in distress  Breasts are symmetrical without mass, tenderness, nipple discharge, skin changes or adenopathy  Abdomen is soft and nontender without masses - abdominoplasty scar well healed  External genitals are normal without lesions or rashes  Urethral meatus and urethra are normal  Bladder is normal to palpation  Vagina is normal without discharge or bleeding  Cervix is normal without discharge or lesion  Uterus is normal, mobile, nontender without palpable mass, slightly globular  Adnexa are normal, nontender, without palpable mass  A:  Yearly exam      P:   Pap up to date   Mammo slip provided    Call if periods worsen in regards to heaviness   RTO one year for yearly exam or sooner as needed

## 2020-02-17 ENCOUNTER — APPOINTMENT (OUTPATIENT)
Dept: LAB | Facility: CLINIC | Age: 40
End: 2020-02-17
Payer: COMMERCIAL

## 2020-02-17 DIAGNOSIS — Z00.00 ENCOUNTER FOR PREVENTIVE CARE: ICD-10-CM

## 2020-02-17 LAB
ALBUMIN SERPL BCP-MCNC: 3.6 G/DL (ref 3.5–5)
ALP SERPL-CCNC: 45 U/L (ref 46–116)
ALT SERPL W P-5'-P-CCNC: 18 U/L (ref 12–78)
ANION GAP SERPL CALCULATED.3IONS-SCNC: 3 MMOL/L (ref 4–13)
AST SERPL W P-5'-P-CCNC: 10 U/L (ref 5–45)
BASOPHILS # BLD AUTO: 0.05 THOUSANDS/ΜL (ref 0–0.1)
BASOPHILS NFR BLD AUTO: 1 % (ref 0–1)
BILIRUB SERPL-MCNC: 0.35 MG/DL (ref 0.2–1)
BUN SERPL-MCNC: 13 MG/DL (ref 5–25)
CALCIUM SERPL-MCNC: 8.5 MG/DL (ref 8.3–10.1)
CHLORIDE SERPL-SCNC: 111 MMOL/L (ref 100–108)
CHOLEST SERPL-MCNC: 165 MG/DL (ref 50–200)
CO2 SERPL-SCNC: 27 MMOL/L (ref 21–32)
CREAT SERPL-MCNC: 0.55 MG/DL (ref 0.6–1.3)
EOSINOPHIL # BLD AUTO: 0.28 THOUSAND/ΜL (ref 0–0.61)
EOSINOPHIL NFR BLD AUTO: 6 % (ref 0–6)
ERYTHROCYTE [DISTWIDTH] IN BLOOD BY AUTOMATED COUNT: 12.4 % (ref 11.6–15.1)
EST. AVERAGE GLUCOSE BLD GHB EST-MCNC: 103 MG/DL
GFR SERPL CREATININE-BSD FRML MDRD: 117 ML/MIN/1.73SQ M
GLUCOSE P FAST SERPL-MCNC: 100 MG/DL (ref 65–99)
HBA1C MFR BLD: 5.2 %
HCT VFR BLD AUTO: 36.3 % (ref 34.8–46.1)
HDLC SERPL-MCNC: 49 MG/DL
HGB BLD-MCNC: 11.5 G/DL (ref 11.5–15.4)
IMM GRANULOCYTES # BLD AUTO: 0.01 THOUSAND/UL (ref 0–0.2)
IMM GRANULOCYTES NFR BLD AUTO: 0 % (ref 0–2)
LDLC SERPL CALC-MCNC: 100 MG/DL (ref 0–100)
LYMPHOCYTES # BLD AUTO: 1.75 THOUSANDS/ΜL (ref 0.6–4.47)
LYMPHOCYTES NFR BLD AUTO: 39 % (ref 14–44)
MCH RBC QN AUTO: 28.2 PG (ref 26.8–34.3)
MCHC RBC AUTO-ENTMCNC: 31.7 G/DL (ref 31.4–37.4)
MCV RBC AUTO: 89 FL (ref 82–98)
MONOCYTES # BLD AUTO: 0.54 THOUSAND/ΜL (ref 0.17–1.22)
MONOCYTES NFR BLD AUTO: 12 % (ref 4–12)
NEUTROPHILS # BLD AUTO: 1.84 THOUSANDS/ΜL (ref 1.85–7.62)
NEUTS SEG NFR BLD AUTO: 42 % (ref 43–75)
NONHDLC SERPL-MCNC: 116 MG/DL
NRBC BLD AUTO-RTO: 0 /100 WBCS
PLATELET # BLD AUTO: 170 THOUSANDS/UL (ref 149–390)
PMV BLD AUTO: 12 FL (ref 8.9–12.7)
POTASSIUM SERPL-SCNC: 4.2 MMOL/L (ref 3.5–5.3)
PROT SERPL-MCNC: 6.9 G/DL (ref 6.4–8.2)
RBC # BLD AUTO: 4.08 MILLION/UL (ref 3.81–5.12)
SODIUM SERPL-SCNC: 141 MMOL/L (ref 136–145)
TRIGL SERPL-MCNC: 79 MG/DL
WBC # BLD AUTO: 4.47 THOUSAND/UL (ref 4.31–10.16)

## 2020-02-17 PROCEDURE — 83036 HEMOGLOBIN GLYCOSYLATED A1C: CPT

## 2020-02-17 PROCEDURE — 85025 COMPLETE CBC W/AUTO DIFF WBC: CPT

## 2020-02-17 PROCEDURE — 80053 COMPREHEN METABOLIC PANEL: CPT

## 2020-02-17 PROCEDURE — 36415 COLL VENOUS BLD VENIPUNCTURE: CPT

## 2020-02-17 PROCEDURE — 80061 LIPID PANEL: CPT

## 2020-02-26 ENCOUNTER — OFFICE VISIT (OUTPATIENT)
Dept: INTERNAL MEDICINE CLINIC | Facility: CLINIC | Age: 40
End: 2020-02-26
Payer: COMMERCIAL

## 2020-02-26 VITALS
DIASTOLIC BLOOD PRESSURE: 64 MMHG | SYSTOLIC BLOOD PRESSURE: 112 MMHG | OXYGEN SATURATION: 97 % | HEART RATE: 67 BPM | HEIGHT: 61 IN | TEMPERATURE: 97.8 F | BODY MASS INDEX: 27 KG/M2 | RESPIRATION RATE: 16 BRPM | WEIGHT: 143 LBS

## 2020-02-26 DIAGNOSIS — Z13.29 SCREENING FOR THYROID DISORDER: ICD-10-CM

## 2020-02-26 DIAGNOSIS — Z00.00 ENCOUNTER FOR ANNUAL PHYSICAL EXAM: Primary | ICD-10-CM

## 2020-02-26 DIAGNOSIS — R07.1 CHEST PAIN ON BREATHING: ICD-10-CM

## 2020-02-26 DIAGNOSIS — E66.3 OVERWEIGHT (BMI 25.0-29.9): ICD-10-CM

## 2020-02-26 DIAGNOSIS — I42.0 DILATED CARDIOMYOPATHY (HCC): ICD-10-CM

## 2020-02-26 PROCEDURE — 99396 PREV VISIT EST AGE 40-64: CPT | Performed by: NURSE PRACTITIONER

## 2020-02-26 PROCEDURE — 3008F BODY MASS INDEX DOCD: CPT | Performed by: NURSE PRACTITIONER

## 2020-02-26 NOTE — ASSESSMENT & PLAN NOTE
Normal exam of a healthy adult female  UTD with gyn screenings, scheduled for mammogram this week  Reviewed her routine labs today which were unremarkable  Pt exercises regularly and reports a healthy diet

## 2020-02-26 NOTE — ASSESSMENT & PLAN NOTE
Pt remains symptomatic  Testing has all been unremarkable including chest CT  Pulmonary evaluation also unremarkable  Her symptoms have been stable and not impairing her functioning  Will continue to monitor, pt should call with changes or concerns

## 2020-02-26 NOTE — ASSESSMENT & PLAN NOTE
Stable  Regular f/u with cardiology  Current medications include lisinopril, carvedilol  Continue with tx and recommended f/u per cardiology

## 2020-02-26 NOTE — ASSESSMENT & PLAN NOTE
Weight is stable, continue with healthy diet and continue with exercise  Recommendation for 3-5 days per week

## 2020-02-26 NOTE — PROGRESS NOTES
ADULT ANNUAL PHYSICAL  [address] [facility] INTERNAL MEDICINE    NAME: Brandon Zimmer  AGE: 37 y o  SEX: female  : 1980     DATE: 2020     Assessment and Plan:     Problem List Items Addressed This Visit        Cardiovascular and Mediastinum    Cardiomyopathy ([address] )     Stable  Regular f/u with cardiology  Current medications include lisinopril, carvedilol  Continue with tx and recommended f/u per cardiology  Other    Chest pain on breathing     Pt remains symptomatic  Testing has all been unremarkable including chest CT  Pulmonary evaluation also unremarkable  Her symptoms have been stable and not impairing her functioning  Will continue to monitor, pt should call with changes or concerns  Encounter for annual physical exam - Primary     Normal exam of a healthy adult female  UTD with gyn screenings, scheduled for mammogram this week  Reviewed her routine labs today which were unremarkable  Pt exercises regularly and reports a healthy diet  Relevant Orders    Comprehensive metabolic panel    Lipid panel    CBC and differential    Overweight (BMI 25 0-29  9)     Weight is stable, continue with healthy diet and continue with exercise  Recommendation for 3-5 days per week  Other Visit Diagnoses     Screening for thyroid disorder        Relevant Orders    TSH, 3rd generation with Free T4 reflex          Immunizations and preventive care screenings were discussed with patient today  Appropriate education was printed on patient's after visit summary  Counseling:  · Exercise: the importance of regular exercise/physical activity was discussed  Recommend exercise 3-5 times per week for at least 30 minutes  BMI Counseling: Body mass index is 27 02 kg/m²   The BMI is above normal  Nutrition recommendations include decreasing portion sizes, encouraging healthy choices of fruits and vegetables and moderation in carbohydrate intake  Exercise recommendations include exercising 3-5 times per week  No follow-ups on file  Chief Complaint:     Chief Complaint   Patient presents with    Physical Exam      History of Present Illness:     Adult Annual Physical   Patient here for a comprehensive physical exam  The patient reports no problems  Diet and Physical Activity  · Diet/Nutrition: well balanced diet  · Exercise: 1-2 times a week on average  Depression Screening  PHQ-9 Depression Screening    PHQ-9:    Frequency of the following problems over the past two weeks:       Little interest or pleasure in doing things:  0 - not at all  Feeling down, depressed, or hopeless:  0 - not at all  PHQ-2 Score:  0       General Health  · Sleep: sleeps well and gets 7-8 hours of sleep on average  · Hearing: normal - bilateral   · Vision: no vision problems, goes for regular eye exams, most recent eye exam <1 year ago and wears contacts  · Dental: regular dental visits, brushes teeth twice daily and flosses teeth occasionally  /GYN Health  · Patient is: premenopausal  · Last menstrual period: 2/15/20  · Contraceptive method: none  Review of Systems:     Review of Systems   Constitutional: Negative for activity change, appetite change, chills, fatigue, fever and unexpected weight change  HENT: Positive for congestion  Negative for hearing loss  Eyes: Negative for visual disturbance  Respiratory: Negative for cough, chest tightness and shortness of breath  Cardiovascular: Negative for chest pain, palpitations and leg swelling  Gastrointestinal: Negative for constipation, diarrhea, nausea and vomiting  Genitourinary: Negative for dysuria and frequency  Musculoskeletal: Negative for arthralgias and myalgias  Allergic/Immunologic: Negative for environmental allergies  Neurological: Negative for dizziness, weakness, numbness and headaches     Psychiatric/Behavioral: Negative for dysphoric mood and sleep disturbance  The patient is not nervous/anxious         Past Medical History:     Past Medical History:   Diagnosis Date    Allergic rhinitis     Anxiety     Atrial fibrillation (HCC)     paroxysmal    Bunion 1985    Candidal vulvovaginitis     Cardiomyopathy ([address] )     Chest pain     CHF (congestive heart failure) (HCC)     chronic systolic    Coronary artery disease     Herpes simplex     Irregular heart beat     LBBB (left bundle branch block)     Normal delivery     2005 son, 2008 daughter    Pacemaker       Past Surgical History:     Past Surgical History:   Procedure Laterality Date    CARDIAC DEFIBRILLATOR PLACEMENT      LA EXCISE EXCESS SKIN TISSUE,ABDOMEN, ADD-ON N/A 5/29/2019    Procedure: CIRCUMFERENTIAL ABDOMINOPLASTY;  Surgeon: Vanda Rome MD;  Location: [address] [place] [address];  Service: Plastics    TUBAL LIGATION  2009      Social History:        Social History     Socioeconomic History    Marital status: /Civil Union     Spouse name: None    Number of children: None    Years of education: None    Highest education level: None   Occupational History    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   Social Needs    Financial resource strain: None    Food insecurity:     Worry: None     Inability: None    Transportation needs:     Medical: None     Non-medical: None   Tobacco Use    Smoking status: Never Smoker    Smokeless tobacco: Never Used    Tobacco comment: history of second hand smoke exposure as a child   Substance and Sexual Activity    Alcohol use: Yes     Frequency: 2-4 times a month     Drinks per session: 1 or 2     Binge frequency: Never     Comment: social    Drug use: No    Sexual activity: Yes     Partners: Male     Birth control/protection: Female Sterilization   Lifestyle    Physical activity:     Days per week: None     Minutes per session: None    Stress: None   Relationships    Social connections:     Talks on phone: None     Gets together: None Attends Latter-day service: None     Active member of club or organization: None     Attends meetings of clubs or organizations: None     Relationship status: None    Intimate partner violence:     Fear of current or ex partner: None     Emotionally abused: None     Physically abused: None     Forced sexual activity: None   Other Topics Concern    None   Social History Narrative    Lives with her  and 2 children    Works full time     Exercises 4-5 times per week    Reports a healthy/balanced diet      Family History:     Family History   Problem Relation Age of Onset    Diabetes Father     Hypertension Father     Heart disease Father     Hyperlipidemia Father     Hypertension Mother     No Known Problems Brother     No Known Problems Brother     No Known Problems Son     No Known Problems Daughter       Current Medications:     Current Outpatient Medications   Medication Sig Dispense Refill    carvedilol (COREG) 25 mg tablet TAKE 1 TABLET TWICE DAILY 180 tablet 3    lisinopril (ZESTRIL) 5 mg tablet TAKE 1 TABLET TWICE DAILY 180 tablet 3     No current facility-administered medications for this visit  Allergies:     No Known Allergies   Physical Exam:     /64   Pulse 67   Temp 97 8 °F (36 6 °C)   Resp 16   Ht 5' 1" (1 549 m)   Wt 64 9 kg (143 lb)   SpO2 97%   BMI 27 02 kg/m²     Physical Exam   Constitutional: She is oriented to person, place, and time  Vital signs are normal  She appears well-developed and well-nourished  She is cooperative  HENT:   Right Ear: Hearing, tympanic membrane, external ear and ear canal normal    Left Ear: Hearing, tympanic membrane, external ear and ear canal normal    Nose: Nose normal  No mucosal edema  Mouth/Throat: Uvula is midline, oropharynx is clear and moist and mucous membranes are normal    Eyes: Pupils are equal, round, and reactive to light  Conjunctivae and lids are normal    Neck: Full passive range of motion without pain   Normal carotid pulses and no JVD present  Carotid bruit is not present  No thyromegaly present  Cardiovascular: Normal rate, regular rhythm, normal heart sounds and intact distal pulses  No murmur heard  Pulmonary/Chest: Effort normal and breath sounds normal  No respiratory distress  Abdominal: Soft  Normal appearance and bowel sounds are normal  There is no tenderness  Musculoskeletal: Normal range of motion  She exhibits no edema  Lymphadenopathy:     She has no cervical adenopathy  Neurological: She is alert and oriented to person, place, and time  She has normal strength and normal reflexes  She displays normal reflexes  No sensory deficit  Skin: Skin is warm, dry and intact  Psychiatric: She has a normal mood and affect  Her speech is normal and behavior is normal  Judgment and thought content normal  Cognition and memory are normal    Vitals reviewed        Melissa A Prentiss Kussmaul, [address] INTERNAL MEDICINE

## 2020-02-28 ENCOUNTER — HOSPITAL ENCOUNTER (OUTPATIENT)
Dept: RADIOLOGY | Age: 40
Discharge: HOME | End: 2020-02-28
Payer: COMMERCIAL

## 2020-02-28 VITALS — BODY MASS INDEX: 27 KG/M2 | WEIGHT: 143 LBS | HEIGHT: 61 IN

## 2020-02-28 DIAGNOSIS — Z12.31 ENCOUNTER FOR SCREENING MAMMOGRAM FOR MALIGNANT NEOPLASM OF BREAST: ICD-10-CM

## 2020-02-28 PROCEDURE — 77067 SCR MAMMO BI INCL CAD: CPT

## 2020-02-28 PROCEDURE — 77063 BREAST TOMOSYNTHESIS BI: CPT

## 2020-03-18 ENCOUNTER — REMOTE DEVICE CLINIC VISIT (OUTPATIENT)
Dept: CARDIOLOGY CLINIC | Facility: CLINIC | Age: 40
End: 2020-03-18
Payer: COMMERCIAL

## 2020-03-18 DIAGNOSIS — Z95.810 PRESENCE OF AUTOMATIC CARDIOVERTER/DEFIBRILLATOR (AICD): Primary | ICD-10-CM

## 2020-03-18 PROCEDURE — 93295 DEV INTERROG REMOTE 1/2/MLT: CPT | Performed by: INTERNAL MEDICINE

## 2020-03-18 PROCEDURE — 93296 REM INTERROG EVL PM/IDS: CPT | Performed by: INTERNAL MEDICINE

## 2020-03-18 NOTE — PROGRESS NOTES
Results for orders placed or performed in visit on 03/18/20   Cardiac EP device report    Narrative    CRT-D  CARELINK TRANSMISSION: BATTERY VOLTAGE ADEQUATE  (3 7 YRS) AP 1% BVP 98%  ALL AVAILABLE LEAD PARAMETERS WITHIN NORMAL LIMITS  NO SIGNIFICANT HIGH RATE EPISODES  OPTI-VOL WITHIN NORMAL LIMITS  VENTRICULAR SENSE EPISODES DETECTED  NORMAL DEVICE FUNCTION  ---HEIN

## 2020-04-14 ENCOUNTER — TELEMEDICINE (OUTPATIENT)
Dept: INTERNAL MEDICINE CLINIC | Facility: CLINIC | Age: 40
End: 2020-04-14
Payer: COMMERCIAL

## 2020-04-14 DIAGNOSIS — F41.9 ANXIETY: Primary | ICD-10-CM

## 2020-04-14 PROCEDURE — 99214 OFFICE O/P EST MOD 30 MIN: CPT | Performed by: NURSE PRACTITIONER

## 2020-04-14 RX ORDER — ALPRAZOLAM 0.25 MG/1
0.25 TABLET ORAL 3 TIMES DAILY PRN
Qty: 30 TABLET | Refills: 0 | Status: SHIPPED | OUTPATIENT
Start: 2020-04-14 | End: 2020-06-05

## 2020-04-23 ENCOUNTER — TELEMEDICINE (OUTPATIENT)
Dept: INTERNAL MEDICINE CLINIC | Facility: CLINIC | Age: 40
End: 2020-04-23
Payer: COMMERCIAL

## 2020-04-23 DIAGNOSIS — F41.9 ANXIETY: Primary | ICD-10-CM

## 2020-04-23 PROCEDURE — 99213 OFFICE O/P EST LOW 20 MIN: CPT | Performed by: NURSE PRACTITIONER

## 2020-06-05 DIAGNOSIS — F41.9 ANXIETY: ICD-10-CM

## 2020-06-05 RX ORDER — ALPRAZOLAM 0.25 MG/1
0.25 TABLET ORAL 3 TIMES DAILY PRN
Qty: 30 TABLET | Refills: 0 | Status: SHIPPED | OUTPATIENT
Start: 2020-06-05 | End: 2022-03-03 | Stop reason: SDUPTHER

## 2020-06-17 ENCOUNTER — REMOTE DEVICE CLINIC VISIT (OUTPATIENT)
Dept: CARDIOLOGY CLINIC | Facility: CLINIC | Age: 40
End: 2020-06-17
Payer: COMMERCIAL

## 2020-06-17 DIAGNOSIS — Z95.810 AICD (AUTOMATIC CARDIOVERTER/DEFIBRILLATOR) PRESENT: Primary | ICD-10-CM

## 2020-06-17 PROCEDURE — 93296 REM INTERROG EVL PM/IDS: CPT | Performed by: INTERNAL MEDICINE

## 2020-06-17 PROCEDURE — 93295 DEV INTERROG REMOTE 1/2/MLT: CPT | Performed by: INTERNAL MEDICINE

## 2020-09-16 ENCOUNTER — REMOTE DEVICE CLINIC VISIT (OUTPATIENT)
Dept: CARDIOLOGY CLINIC | Facility: CLINIC | Age: 40
End: 2020-09-16
Payer: COMMERCIAL

## 2020-09-16 DIAGNOSIS — Z95.810 PRESENCE OF AUTOMATIC CARDIOVERTER/DEFIBRILLATOR (AICD): Primary | ICD-10-CM

## 2020-09-16 PROCEDURE — 93295 DEV INTERROG REMOTE 1/2/MLT: CPT | Performed by: INTERNAL MEDICINE

## 2020-09-16 PROCEDURE — 93296 REM INTERROG EVL PM/IDS: CPT | Performed by: INTERNAL MEDICINE

## 2020-09-16 NOTE — PROGRESS NOTES
Results for orders placed or performed in visit on 09/16/20   Cardiac EP device report    Narrative    CRT-D  CARELINK TRANSMISSION: BATTERY VOLTAGE ADEQUATE  (3 YRS) AP 1% BVP 98%  ALL AVAILABLE LEAD PARAMETERS WITHIN NORMAL LIMITS  NO SIGNIFICANT HIGH RATE EPISODES  OPTI-VOL WITHIN NORMAL LIMITS  VENTRICULAR SENSE EPISODES DETECTED  NORMAL DEVICE FUNCTION  ---HEIN

## 2020-11-13 ENCOUNTER — TELEMEDICINE (OUTPATIENT)
Dept: INTERNAL MEDICINE CLINIC | Facility: CLINIC | Age: 40
End: 2020-11-13
Payer: COMMERCIAL

## 2020-11-13 DIAGNOSIS — Z20.822 EXPOSURE TO COVID-19 VIRUS: ICD-10-CM

## 2020-11-13 DIAGNOSIS — Z20.822 EXPOSURE TO COVID-19 VIRUS: Primary | ICD-10-CM

## 2020-11-13 PROCEDURE — U0003 INFECTIOUS AGENT DETECTION BY NUCLEIC ACID (DNA OR RNA); SEVERE ACUTE RESPIRATORY SYNDROME CORONAVIRUS 2 (SARS-COV-2) (CORONAVIRUS DISEASE [COVID-19]), AMPLIFIED PROBE TECHNIQUE, MAKING USE OF HIGH THROUGHPUT TECHNOLOGIES AS DESCRIBED BY CMS-2020-01-R: HCPCS | Performed by: NURSE PRACTITIONER

## 2020-11-13 PROCEDURE — 99214 OFFICE O/P EST MOD 30 MIN: CPT | Performed by: NURSE PRACTITIONER

## 2020-11-15 LAB — SARS-COV-2 RNA SPEC QL NAA+PROBE: NOT DETECTED

## 2020-11-20 ENCOUNTER — TELEMEDICINE (OUTPATIENT)
Dept: CARDIOLOGY CLINIC | Facility: CLINIC | Age: 40
End: 2020-11-20
Payer: COMMERCIAL

## 2020-11-20 VITALS
SYSTOLIC BLOOD PRESSURE: 98 MMHG | BODY MASS INDEX: 28.7 KG/M2 | HEIGHT: 61 IN | WEIGHT: 152 LBS | DIASTOLIC BLOOD PRESSURE: 57 MMHG | HEART RATE: 79 BPM

## 2020-11-20 DIAGNOSIS — I44.7 LEFT BUNDLE-BRANCH BLOCK: Primary | ICD-10-CM

## 2020-11-20 DIAGNOSIS — I48.0 PAROXYSMAL ATRIAL FIBRILLATION (HCC): ICD-10-CM

## 2020-11-20 DIAGNOSIS — I42.0 DILATED CARDIOMYOPATHY (HCC): ICD-10-CM

## 2020-11-20 PROCEDURE — 3008F BODY MASS INDEX DOCD: CPT | Performed by: INTERNAL MEDICINE

## 2020-11-20 PROCEDURE — 1036F TOBACCO NON-USER: CPT | Performed by: INTERNAL MEDICINE

## 2020-11-20 PROCEDURE — 99214 OFFICE O/P EST MOD 30 MIN: CPT | Performed by: INTERNAL MEDICINE

## 2020-12-18 ENCOUNTER — IN-CLINIC DEVICE VISIT (OUTPATIENT)
Dept: CARDIOLOGY CLINIC | Facility: CLINIC | Age: 40
End: 2020-12-18
Payer: COMMERCIAL

## 2020-12-18 DIAGNOSIS — Z95.810 AICD (AUTOMATIC CARDIOVERTER/DEFIBRILLATOR) PRESENT: Primary | ICD-10-CM

## 2020-12-18 PROCEDURE — 93284 PRGRMG EVAL IMPLANTABLE DFB: CPT | Performed by: INTERNAL MEDICINE

## 2020-12-23 DIAGNOSIS — I42.0 DILATED CARDIOMYOPATHY (HCC): ICD-10-CM

## 2020-12-24 RX ORDER — CARVEDILOL 25 MG/1
25 TABLET ORAL 2 TIMES DAILY
Qty: 180 TABLET | Refills: 3 | Status: SHIPPED | OUTPATIENT
Start: 2020-12-24 | End: 2021-12-29 | Stop reason: SDUPTHER

## 2020-12-24 RX ORDER — LISINOPRIL 5 MG/1
5 TABLET ORAL 2 TIMES DAILY
Qty: 180 TABLET | Refills: 3 | Status: SHIPPED | OUTPATIENT
Start: 2020-12-24 | End: 2021-12-18

## 2021-03-01 ENCOUNTER — OFFICE VISIT (OUTPATIENT)
Dept: INTERNAL MEDICINE CLINIC | Facility: CLINIC | Age: 41
End: 2021-03-01
Payer: COMMERCIAL

## 2021-03-01 VITALS
BODY MASS INDEX: 32.4 KG/M2 | DIASTOLIC BLOOD PRESSURE: 70 MMHG | TEMPERATURE: 97.6 F | HEART RATE: 76 BPM | RESPIRATION RATE: 16 BRPM | WEIGHT: 171.6 LBS | OXYGEN SATURATION: 98 % | SYSTOLIC BLOOD PRESSURE: 112 MMHG | HEIGHT: 61 IN

## 2021-03-01 DIAGNOSIS — E66.09 CLASS 1 OBESITY DUE TO EXCESS CALORIES WITH SERIOUS COMORBIDITY AND BODY MASS INDEX (BMI) OF 32.0 TO 32.9 IN ADULT: ICD-10-CM

## 2021-03-01 DIAGNOSIS — R61 NIGHT SWEATS: ICD-10-CM

## 2021-03-01 DIAGNOSIS — I44.7 LEFT BUNDLE-BRANCH BLOCK: ICD-10-CM

## 2021-03-01 DIAGNOSIS — Z00.00 ANNUAL PHYSICAL EXAM: Primary | ICD-10-CM

## 2021-03-01 DIAGNOSIS — I48.0 PAROXYSMAL ATRIAL FIBRILLATION (HCC): ICD-10-CM

## 2021-03-01 DIAGNOSIS — I42.0 DILATED CARDIOMYOPATHY (HCC): ICD-10-CM

## 2021-03-01 DIAGNOSIS — Z11.4 SCREENING FOR HIV (HUMAN IMMUNODEFICIENCY VIRUS): ICD-10-CM

## 2021-03-01 PROBLEM — E66.811 CLASS 1 OBESITY DUE TO EXCESS CALORIES WITH SERIOUS COMORBIDITY AND BODY MASS INDEX (BMI) OF 32.0 TO 32.9 IN ADULT: Status: ACTIVE | Noted: 2019-08-26

## 2021-03-01 PROCEDURE — 3725F SCREEN DEPRESSION PERFORMED: CPT | Performed by: NURSE PRACTITIONER

## 2021-03-01 PROCEDURE — 1036F TOBACCO NON-USER: CPT | Performed by: NURSE PRACTITIONER

## 2021-03-01 PROCEDURE — 99396 PREV VISIT EST AGE 40-64: CPT | Performed by: NURSE PRACTITIONER

## 2021-03-01 PROCEDURE — 3008F BODY MASS INDEX DOCD: CPT | Performed by: NURSE PRACTITIONER

## 2021-03-01 NOTE — ASSESSMENT & PLAN NOTE
Stable with ongoing f/u with cardiology  Taking carvedilol and lisinopril  Continue tx and f/u as advised

## 2021-03-01 NOTE — ASSESSMENT & PLAN NOTE
Normal exam of healthy adult female  UTD with screening mammo, scheduled for this month  Regular gyn screening  She is due for labs, orders provided  Continue with reg dental and eye exams

## 2021-03-01 NOTE — PROGRESS NOTES
ADULT ANNUAL PHYSICAL  [address] [facility] INTERNAL MEDICINE    NAME: Teresa Siomn  AGE: 41 y o  SEX: female  : 1980     DATE: 3/1/2021     Assessment and Plan:     Problem List Items Addressed This Visit        Cardiovascular and Mediastinum    Cardiomyopathy ([address] )     Stable with ongoing f/u with cardiology  Taking carvedilol and lisinopril  Continue tx and f/u as advised  Left bundle-branch block     Asymptomatic, taking carvedilol and f/u annually with cardiology  No change in treatment  Paroxysmal atrial fibrillation (HCC)     RRR on exam   Taking carvedilol, annual f/u with cardiology  Continue current tx  Other    Annual physical exam - Primary     Normal exam of healthy adult female  UTD with screening mammo, scheduled for this month  Regular gyn screening  She is due for labs, orders provided  Continue with reg dental and eye exams  Relevant Orders    Comprehensive metabolic panel    Lipid panel    CBC and differential    UA w Reflex to Microscopic w Reflex to Culture    Class 1 obesity due to excess calories with serious comorbidity and body mass index (BMI) of 32 0 to 32 9 in adult     Weight gain noted since last visit  Pt reports less activity, increased PO intake with more unhealthy food choices  She attributes this to covid, being home for work, increased stress  Encouraged her to work on getting some outdoor exercise  Night sweats     Night sweats and feeling hot overnight for the past 6 months  No temperature intolerance during the daytime  No fevers  Appetite normal, no weight loss  Still has regular menses  Will check labs and f/u as indicated           Relevant Orders    Comprehensive metabolic panel    CBC and differential    TSH, 3rd generation with Free T4 reflex      Other Visit Diagnoses     Screening for HIV (human immunodeficiency virus)        Relevant Orders    HIV /2 Antigen/Antibody (4th Generation) w Reflex [facility]          Immunizations and preventive care screenings were discussed with patient today  Appropriate education was printed on patient's after visit summary  Counseling:  Dental Health: discussed importance of regular tooth brushing, flossing, and dental visits  Injury prevention: discussed safety/seat belts, safety helmets, smoke detectors, carbon dioxide detectors, and smoking near bedding or upholstery  · Exercise: the importance of regular exercise/physical activity was discussed  Recommend exercise 3-5 times per week for at least 30 minutes  BMI Counseling: Body mass index is 32 42 kg/m²  The BMI is above normal  Nutrition recommendations include decreasing portion sizes, encouraging healthy choices of fruits and vegetables, consuming healthier snacks, moderation in carbohydrate intake, increasing intake of lean protein, reducing intake of saturated and trans fat and reducing intake of cholesterol  Exercise recommendations include exercising 3-5 times per week and strength training exercises  No follow-ups on file  Chief Complaint:     Chief Complaint   Patient presents with    Physical Exam      History of Present Illness:     Adult Annual Physical   Patient here for a comprehensive physical exam  The patient reports no problems  Diet and Physical Activity  · Diet/Nutrition: poor diet, frequent junk food and consuming 3-5 servings of fruits/vegetables daily  · Exercise: no formal exercise  Depression Screening  PHQ-9 Depression Screening    PHQ-9:   Frequency of the following problems over the past two weeks:      Little interest or pleasure in doing things: 0 - not at all  Feeling down, depressed, or hopeless: 0 - not at all  PHQ-2 Score: 0       General Health  · Sleep: sleeps poorly, unrefreshing sleep and sleep disrupted, wakes often     · Hearing: normal - bilateral   · Vision: no vision problems, goes for regular eye exams, most recent eye exam >1 year ago and wears contacts  · Dental: regular dental visits and brushes teeth twice daily  /GYN Health  · Patient is: premenopausal  · Last menstrual period: 2/22/21 approx  · Contraceptive method: tubal ligation  Review of Systems:     Review of Systems   Constitutional: Positive for diaphoresis (nocturnal)  Negative for activity change, appetite change, chills, fatigue, fever and unexpected weight change  HENT: Negative for hearing loss  Eyes: Negative for visual disturbance  Respiratory: Negative for cough, chest tightness and shortness of breath  Cardiovascular: Negative for chest pain, palpitations and leg swelling  Gastrointestinal: Negative for abdominal pain, constipation, diarrhea, nausea and vomiting  Endocrine: Negative for heat intolerance  Genitourinary: Negative for dysuria, frequency and menstrual problem  Musculoskeletal: Negative for arthralgias and myalgias  Allergic/Immunologic: Negative for environmental allergies  Neurological: Negative for dizziness, weakness, numbness and headaches  Psychiatric/Behavioral: Positive for sleep disturbance  Negative for dysphoric mood  The patient is not nervous/anxious         Past Medical History:     Past Medical History:   Diagnosis Date    Allergic rhinitis     Anxiety     Atrial fibrillation (HCC)     paroxysmal    Bunion 1985    Candidal vulvovaginitis     Cardiomyopathy ([address] )     Chest pain     CHF (congestive heart failure) (HCC)     chronic systolic    Coronary artery disease     Herpes simplex     Irregular heart beat     LBBB (left bundle branch block)     Normal delivery     2005 son, 2008 daughter    Pacemaker       Past Surgical History:     Past Surgical History:   Procedure Laterality Date    CARDIAC DEFIBRILLATOR PLACEMENT      CA EXCISE EXCESS SKIN TISSUE,ABDOMEN, ADD-ON N/A 5/29/2019    Procedure: CIRCUMFERENTIAL ABDOMINOPLASTY;  Surgeon: Gamal Cole MD; Location: [address] [place] [address] MAIN OR;  Service: Plastics    TUBAL LIGATION  2009      Social History:        Social History     Socioeconomic History    Marital status: /Civil Union     Spouse name: None    Number of children: None    Years of education: None    Highest education level: None   Occupational History    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   Social Needs    Financial resource strain: None    Food insecurity     Worry: None     Inability: None    Transportation needs     Medical: None     Non-medical: None   Tobacco Use    Smoking status: Never Smoker    Smokeless tobacco: Never Used    Tobacco comment: history of second hand smoke exposure as a child   Substance and Sexual Activity    Alcohol use:  Yes     Alcohol/week: 2 0 standard drinks     Types: 2 Glasses of wine per week     Frequency: 2-4 times a month     Drinks per session: 1 or 2     Binge frequency: Never     Comment: social    Drug use: No    Sexual activity: Yes     Partners: Male     Birth control/protection: Female Sterilization   Lifestyle    Physical activity     Days per week: None     Minutes per session: None    Stress: None   Relationships    Social connections     Talks on phone: None     Gets together: None     Attends Sikh service: None     Active member of club or organization: None     Attends meetings of clubs or organizations: None     Relationship status: None    Intimate partner violence     Fear of current or ex partner: None     Emotionally abused: None     Physically abused: None     Forced sexual activity: None   Other Topics Concern    None   Social History Narrative    Lives with her  and 2 children    Works full time     Exercises 4-5 times per week    Reports a healthy/balanced diet      Family History:     Family History   Problem Relation Age of Onset    Diabetes Father     Hypertension Father     Heart disease Father     Hyperlipidemia Father     Hypertension Mother     No Known Problems Brother     No Known Problems Brother     No Known Problems Son     No Known Problems Daughter     No Known Problems Maternal Grandmother     No Known Problems Maternal Grandfather     No Known Problems Paternal Grandmother     No Known Problems Paternal Grandfather     No Known Problems Maternal Aunt     No Known Problems Paternal Aunt     No Known Problems Paternal Aunt     No Known Problems Paternal Aunt       Current Medications:     Current Outpatient Medications   Medication Sig Dispense Refill    carvedilol (COREG) 25 mg tablet Take 1 tablet (25 mg total) by mouth 2 (two) times a day 180 tablet 3    lisinopril (ZESTRIL) 5 mg tablet Take 1 tablet (5 mg total) by mouth 2 (two) times a day 180 tablet 3    ALPRAZolam (XANAX) 0 25 mg tablet TAKE 1 TABLET (0 25 MG TOTAL) BY MOUTH 3 (THREE) TIMES A DAY AS NEEDED FOR ANXIETY  (Patient not taking: Reported on 3/1/2021) 30 tablet 0     No current facility-administered medications for this visit  Allergies:     No Known Allergies   Physical Exam:     /70 (BP Location: Left arm, Patient Position: Sitting)   Pulse 76   Temp 97 6 °F (36 4 °C)   Resp 16   Ht 5' 1" (1 549 m)   Wt 77 8 kg (171 lb 9 6 oz)   SpO2 98%   BMI 32 42 kg/m²     Physical Exam  Vitals signs reviewed  Constitutional:       General: She is awake  She is not in acute distress  Appearance: Normal appearance  She is well-developed  She is not ill-appearing, toxic-appearing or diaphoretic  HENT:      Head: Normocephalic  Right Ear: Hearing, tympanic membrane, ear canal and external ear normal       Left Ear: Hearing, tympanic membrane, ear canal and external ear normal       Nose: Nose normal       Right Turbinates: Swollen and pale  Left Turbinates: Swollen and pale  Mouth/Throat:      Lips: Pink  Mouth: Mucous membranes are moist       Pharynx: Oropharynx is clear  Uvula midline     Eyes:      General: Lids are normal       Conjunctiva/sclera: Conjunctivae normal       Pupils: Pupils are equal, round, and reactive to light  Neck:      Musculoskeletal: Full passive range of motion without pain  Thyroid: No thyroid mass or thyromegaly  Vascular: Normal carotid pulses  No carotid bruit or JVD  Cardiovascular:      Rate and Rhythm: Normal rate and regular rhythm  Pulses: Normal pulses  Heart sounds: Normal heart sounds, S1 normal and S2 normal    Pulmonary:      Effort: Pulmonary effort is normal       Breath sounds: Normal breath sounds  Abdominal:      General: Abdomen is flat  Bowel sounds are normal       Palpations: Abdomen is soft  Tenderness: There is no abdominal tenderness  Musculoskeletal: Normal range of motion  Right lower leg: No edema  Left lower leg: No edema  Lymphadenopathy:      Head:      Right side of head: No submental, submandibular, tonsillar, preauricular, posterior auricular or occipital adenopathy  Left side of head: No submental, submandibular, tonsillar, preauricular, posterior auricular or occipital adenopathy  Cervical: No cervical adenopathy  Skin:     General: Skin is warm and dry  Neurological:      General: No focal deficit present  Mental Status: She is alert and oriented to person, place, and time  Sensory: No sensory deficit  Motor: Motor function is intact  Deep Tendon Reflexes: Reflexes are normal and symmetric  Psychiatric:         Attention and Perception: Attention normal          Mood and Affect: Mood normal          Speech: Speech normal          Behavior: Behavior normal  Behavior is cooperative  Thought Content:  Thought content normal          Cognition and Memory: Cognition normal          Judgment: Judgment normal           JUAN LUIS Hale  [facility]

## 2021-03-01 NOTE — PATIENT INSTRUCTIONS

## 2021-03-01 NOTE — ASSESSMENT & PLAN NOTE
Night sweats and feeling hot overnight for the past 6 months  No temperature intolerance during the daytime  No fevers  Appetite normal, no weight loss  Still has regular menses  Will check labs and f/u as indicated

## 2021-03-01 NOTE — ASSESSMENT & PLAN NOTE
Weight gain noted since last visit  Pt reports less activity, increased PO intake with more unhealthy food choices  She attributes this to covid, being home for work, increased stress  Encouraged her to work on getting some outdoor exercise

## 2021-03-09 ENCOUNTER — HOSPITAL ENCOUNTER (OUTPATIENT)
Dept: RADIOLOGY | Age: 41
Discharge: HOME | End: 2021-03-09
Payer: COMMERCIAL

## 2021-03-09 VITALS — WEIGHT: 169 LBS | BODY MASS INDEX: 31.91 KG/M2 | HEIGHT: 61 IN

## 2021-03-09 DIAGNOSIS — Z12.31 ENCOUNTER FOR SCREENING MAMMOGRAM FOR MALIGNANT NEOPLASM OF BREAST: ICD-10-CM

## 2021-03-09 PROCEDURE — 77063 BREAST TOMOSYNTHESIS BI: CPT

## 2021-03-09 PROCEDURE — 77067 SCR MAMMO BI INCL CAD: CPT

## 2021-03-13 ENCOUNTER — APPOINTMENT (OUTPATIENT)
Dept: LAB | Age: 41
End: 2021-03-13
Payer: COMMERCIAL

## 2021-03-13 DIAGNOSIS — Z00.00 ANNUAL PHYSICAL EXAM: ICD-10-CM

## 2021-03-13 DIAGNOSIS — R61 NIGHT SWEATS: ICD-10-CM

## 2021-03-13 DIAGNOSIS — Z11.4 SCREENING FOR HIV (HUMAN IMMUNODEFICIENCY VIRUS): ICD-10-CM

## 2021-03-13 LAB
ALBUMIN SERPL BCP-MCNC: 3.9 G/DL (ref 3.5–5)
ALP SERPL-CCNC: 52 U/L (ref 46–116)
ALT SERPL W P-5'-P-CCNC: 27 U/L (ref 12–78)
ANION GAP SERPL CALCULATED.3IONS-SCNC: 8 MMOL/L (ref 4–13)
AST SERPL W P-5'-P-CCNC: 12 U/L (ref 5–45)
BASOPHILS # BLD AUTO: 0.05 THOUSANDS/ΜL (ref 0–0.1)
BASOPHILS NFR BLD AUTO: 1 % (ref 0–1)
BILIRUB SERPL-MCNC: 0.33 MG/DL (ref 0.2–1)
BILIRUB UR QL STRIP: NEGATIVE
BUN SERPL-MCNC: 17 MG/DL (ref 5–25)
CALCIUM SERPL-MCNC: 8.8 MG/DL (ref 8.3–10.1)
CHLORIDE SERPL-SCNC: 104 MMOL/L (ref 100–108)
CHOLEST SERPL-MCNC: 197 MG/DL (ref 50–200)
CLARITY UR: CLEAR
CO2 SERPL-SCNC: 25 MMOL/L (ref 21–32)
COLOR UR: YELLOW
CREAT SERPL-MCNC: 0.6 MG/DL (ref 0.6–1.3)
EOSINOPHIL # BLD AUTO: 0.24 THOUSAND/ΜL (ref 0–0.61)
EOSINOPHIL NFR BLD AUTO: 4 % (ref 0–6)
ERYTHROCYTE [DISTWIDTH] IN BLOOD BY AUTOMATED COUNT: 12.4 % (ref 11.6–15.1)
GFR SERPL CREATININE-BSD FRML MDRD: 113 ML/MIN/1.73SQ M
GLUCOSE P FAST SERPL-MCNC: 95 MG/DL (ref 65–99)
GLUCOSE UR STRIP-MCNC: NEGATIVE MG/DL
HCT VFR BLD AUTO: 37.2 % (ref 34.8–46.1)
HDLC SERPL-MCNC: 50 MG/DL
HGB BLD-MCNC: 11.9 G/DL (ref 11.5–15.4)
HGB UR QL STRIP.AUTO: NEGATIVE
IMM GRANULOCYTES # BLD AUTO: 0.02 THOUSAND/UL (ref 0–0.2)
IMM GRANULOCYTES NFR BLD AUTO: 0 % (ref 0–2)
KETONES UR STRIP-MCNC: NEGATIVE MG/DL
LDLC SERPL CALC-MCNC: 123 MG/DL (ref 0–100)
LEUKOCYTE ESTERASE UR QL STRIP: NEGATIVE
LYMPHOCYTES # BLD AUTO: 2.07 THOUSANDS/ΜL (ref 0.6–4.47)
LYMPHOCYTES NFR BLD AUTO: 32 % (ref 14–44)
MCH RBC QN AUTO: 28.1 PG (ref 26.8–34.3)
MCHC RBC AUTO-ENTMCNC: 32 G/DL (ref 31.4–37.4)
MCV RBC AUTO: 88 FL (ref 82–98)
MONOCYTES # BLD AUTO: 0.64 THOUSAND/ΜL (ref 0.17–1.22)
MONOCYTES NFR BLD AUTO: 10 % (ref 4–12)
NEUTROPHILS # BLD AUTO: 3.39 THOUSANDS/ΜL (ref 1.85–7.62)
NEUTS SEG NFR BLD AUTO: 53 % (ref 43–75)
NITRITE UR QL STRIP: NEGATIVE
NONHDLC SERPL-MCNC: 147 MG/DL
NRBC BLD AUTO-RTO: 0 /100 WBCS
PH UR STRIP.AUTO: 6.5 [PH]
PLATELET # BLD AUTO: 177 THOUSANDS/UL (ref 149–390)
PMV BLD AUTO: 11.5 FL (ref 8.9–12.7)
POTASSIUM SERPL-SCNC: 4.6 MMOL/L (ref 3.5–5.3)
PROT SERPL-MCNC: 7.4 G/DL (ref 6.4–8.2)
PROT UR STRIP-MCNC: NEGATIVE MG/DL
RBC # BLD AUTO: 4.23 MILLION/UL (ref 3.81–5.12)
SODIUM SERPL-SCNC: 137 MMOL/L (ref 136–145)
SP GR UR STRIP.AUTO: 1.02 (ref 1–1.03)
TRIGL SERPL-MCNC: 120 MG/DL
TSH SERPL DL<=0.05 MIU/L-ACNC: 1.31 UIU/ML (ref 0.36–3.74)
UROBILINOGEN UR QL STRIP.AUTO: 0.2 E.U./DL
WBC # BLD AUTO: 6.41 THOUSAND/UL (ref 4.31–10.16)

## 2021-03-13 PROCEDURE — 36415 COLL VENOUS BLD VENIPUNCTURE: CPT

## 2021-03-13 PROCEDURE — 84443 ASSAY THYROID STIM HORMONE: CPT

## 2021-03-13 PROCEDURE — 81003 URINALYSIS AUTO W/O SCOPE: CPT | Performed by: NURSE PRACTITIONER

## 2021-03-13 PROCEDURE — 80061 LIPID PANEL: CPT

## 2021-03-13 PROCEDURE — 80053 COMPREHEN METABOLIC PANEL: CPT

## 2021-03-13 PROCEDURE — 85025 COMPLETE CBC W/AUTO DIFF WBC: CPT

## 2021-03-13 PROCEDURE — 87389 HIV-1 AG W/HIV-1&-2 AB AG IA: CPT

## 2021-03-15 LAB — HIV 1+2 AB+HIV1 P24 AG SERPL QL IA: NORMAL

## 2021-03-25 ENCOUNTER — REMOTE DEVICE CLINIC VISIT (OUTPATIENT)
Dept: CARDIOLOGY CLINIC | Facility: CLINIC | Age: 41
End: 2021-03-25
Payer: COMMERCIAL

## 2021-03-25 DIAGNOSIS — Z95.810 AICD (AUTOMATIC CARDIOVERTER/DEFIBRILLATOR) PRESENT: Primary | ICD-10-CM

## 2021-03-25 PROCEDURE — 93295 DEV INTERROG REMOTE 1/2/MLT: CPT | Performed by: INTERNAL MEDICINE

## 2021-03-25 PROCEDURE — 93296 REM INTERROG EVL PM/IDS: CPT | Performed by: INTERNAL MEDICINE

## 2021-03-25 NOTE — PROGRESS NOTES
Results for orders placed or performed in visit on 03/25/21   Cardiac EP device report    Narrative    CRT-D  CARELINK TRANSMISSION: BATTERY STATUS "OK"  AP 0%  98% VSRP 1%  ALL AVAILABLE LEAD PARAMETERS WITHIN NORMAL LIMITS  NO SIGNIFICANT HIGH RATE EPISODES  [address]; MARKERS ONLY  OPTI-VOL WITHIN NORMAL LIMITS  NORMAL DEVICE FUNCTION   NC       Current Outpatient Medications:     ALPRAZolam (XANAX) 0 25 mg tablet, TAKE 1 TABLET (0 25 MG TOTAL) BY MOUTH 3 (THREE) TIMES A DAY AS NEEDED FOR ANXIETY  (Patient not taking: Reported on 3/1/2021), Disp: 30 tablet, Rfl: 0    carvedilol (COREG) 25 mg tablet, Take 1 tablet (25 mg total) by mouth 2 (two) times a day, Disp: 180 tablet, Rfl: 3    lisinopril (ZESTRIL) 5 mg tablet, Take 1 tablet (5 mg total) by mouth 2 (two) times a day, Disp: 180 tablet, Rfl: 3

## 2021-04-12 ENCOUNTER — ANNUAL EXAM (OUTPATIENT)
Dept: OBGYN CLINIC | Facility: CLINIC | Age: 41
End: 2021-04-12
Payer: COMMERCIAL

## 2021-04-12 VITALS
BODY MASS INDEX: 32.66 KG/M2 | SYSTOLIC BLOOD PRESSURE: 98 MMHG | DIASTOLIC BLOOD PRESSURE: 69 MMHG | WEIGHT: 173 LBS | HEIGHT: 61 IN

## 2021-04-12 DIAGNOSIS — Z12.31 ENCOUNTER FOR SCREENING MAMMOGRAM FOR MALIGNANT NEOPLASM OF BREAST: Primary | ICD-10-CM

## 2021-04-12 PROCEDURE — 3008F BODY MASS INDEX DOCD: CPT | Performed by: NURSE PRACTITIONER

## 2021-04-12 PROCEDURE — S0612 ANNUAL GYNECOLOGICAL EXAMINA: HCPCS | Performed by: OBSTETRICS & GYNECOLOGY

## 2021-04-12 NOTE — PROGRESS NOTES
Concha Code Rumbalski  1980      CC:  Yearly exam    S:  35 y o  female here for yearly exam  Her cycles are regular, but on heavier/crampier side  LMP 3/23/20  No significant change to heaviness  Having some hot flashes- thinks it may just be related to some weight gain  Working from home, does not think she will have to go back to the office  History postpartum cardiomyopathy, has ICD  Follows with cardiology - stable   and monogamous  Has a son and daughter  Daughter is 15 - and just got her period  Sexual activity: She is sexually active without pain, bleeding or dryness  Contraception: She uses tubal ligation for contraception  Last Pap 2/1/18 - Normal Cytology, Neg HPV  Last Mammo 3/9/21 - [facility] 1    We reviewed ASC guidelines for Pap testing today       Family hx of breast cancer: no  Family hx of ovarian cancer: no  Family hx of colon cancer: no      Current Outpatient Medications:     carvedilol (COREG) 25 mg tablet, Take 1 tablet (25 mg total) by mouth 2 (two) times a day, Disp: 180 tablet, Rfl: 3    lisinopril (ZESTRIL) 5 mg tablet, Take 1 tablet (5 mg total) by mouth 2 (two) times a day, Disp: 180 tablet, Rfl: 3    ALPRAZolam (XANAX) 0 25 mg tablet, TAKE 1 TABLET (0 25 MG TOTAL) BY MOUTH 3 (THREE) TIMES A DAY AS NEEDED FOR ANXIETY  (Patient not taking: Reported on 3/1/2021), Disp: 30 tablet, Rfl: 0  Social History     Socioeconomic History    Marital status: /Civil Union     Spouse name: Not on file    Number of children: Not on file    Years of education: Not on file    Highest education level: Not on file   Occupational History    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   Social Needs    Financial resource strain: Not on file    Food insecurity     Worry: Not on file     Inability: Not on file   [employer] needs     Medical: Not on file     Non-medical: Not on file   Tobacco Use    Smoking status: Never Smoker    Smokeless tobacco: Never Used   [place] Tobacco comment: history of second hand smoke exposure as a child   Substance and Sexual Activity    Alcohol use:  Yes     Alcohol/week: 2 0 standard drinks     Types: 2 Glasses of wine per week     Frequency: 2-4 times a month     Drinks per session: 1 or 2     Binge frequency: Never     Comment: social    Drug use: No    Sexual activity: Yes     Partners: Male     Birth control/protection: Female Sterilization   Lifestyle    Physical activity     Days per week: Not on file     Minutes per session: Not on file    Stress: Not on file   Relationships    Social connections     Talks on phone: Not on file     Gets together: Not on file     Attends Congregation service: Not on file     Active member of club or organization: Not on file     Attends meetings of clubs or organizations: Not on file     Relationship status: Not on file    Intimate partner violence     Fear of current or ex partner: Not on file     Emotionally abused: Not on file     Physically abused: Not on file     Forced sexual activity: Not on file   Other Topics Concern    Not on file   Social History Narrative    Lives with her  and 2 children    Works full time     Exercises 4-5 times per week    Reports a healthy/balanced diet     Family History   Problem Relation Age of Onset    Diabetes Father     Hypertension Father     Heart disease Father     Hyperlipidemia Father     Hypertension Mother     No Known Problems Brother     No Known Problems Brother     No Known Problems Son     No Known Problems Daughter     No Known Problems Maternal Grandmother     No Known Problems Maternal Grandfather     No Known Problems Paternal Grandmother     No Known Problems Paternal Grandfather     No Known Problems Maternal Aunt     No Known Problems Paternal Aunt     No Known Problems Paternal Aunt     No Known Problems Paternal Aunt       Past Medical History:   Diagnosis Date    Allergic rhinitis     Anxiety     Atrial fibrillation ([facility] )     paroxysmal    Bunion 1985    Candidal vulvovaginitis     Cardiomyopathy ([facility] )     Chest pain     CHF (congestive heart failure) (HCC)     chronic systolic    Coronary artery disease     Herpes simplex     Irregular heart beat     LBBB (left bundle branch block)     Normal delivery     2005 son, 2008 daughter    Pacemaker         Review of Systems   Respiratory: Negative  Cardiovascular: Negative  Gastrointestinal: Negative for constipation and diarrhea  Genitourinary: Negative for difficulty urinating, pelvic pain, vaginal bleeding, vaginal discharge, itching or odor  O:  Blood pressure 98/69, height 5' 1 42" (1 56 m), weight 78 5 kg (173 lb), last menstrual period 03/23/2021, not currently breastfeeding  Patient appears well and is not in distress  Breasts are symmetrical without mass, tenderness, nipple discharge, skin changes or adenopathy  Abdomen is soft and nontender without masses  External genitals are normal without lesions or rashes  Urethral meatus and urethra are normal  Bladder is normal to palpation  Vagina is normal without discharge or bleeding  Cervix is normal without discharge or lesion  Uterus is normal, mobile, nontender without palpable mass  Adnexa are normal, nontender, without palpable mass  A:  Yearly exam      P:   Pap up to date     Mammo slip provided    RTO one year for yearly exam or sooner as needed

## 2021-05-27 ENCOUNTER — OFFICE VISIT (OUTPATIENT)
Dept: INTERNAL MEDICINE CLINIC | Facility: CLINIC | Age: 41
End: 2021-05-27
Payer: COMMERCIAL

## 2021-05-27 ENCOUNTER — TRANSCRIBE ORDERS (OUTPATIENT)
Dept: ADMINISTRATIVE | Age: 41
End: 2021-05-27

## 2021-05-27 VITALS
DIASTOLIC BLOOD PRESSURE: 72 MMHG | HEIGHT: 61 IN | TEMPERATURE: 97.7 F | BODY MASS INDEX: 32.28 KG/M2 | WEIGHT: 171 LBS | OXYGEN SATURATION: 99 % | HEART RATE: 79 BPM | SYSTOLIC BLOOD PRESSURE: 112 MMHG

## 2021-05-27 DIAGNOSIS — I48.0 PAROXYSMAL ATRIAL FIBRILLATION (HCC): ICD-10-CM

## 2021-05-27 DIAGNOSIS — R10.84 GENERALIZED ABDOMINAL PAIN: Primary | ICD-10-CM

## 2021-05-27 LAB
BILIRUB UR QL STRIP: NEGATIVE
CLARITY UR: CLEAR
COLOR UR: YELLOW
GLUCOSE UR STRIP-MCNC: NEGATIVE MG/DL
HGB UR QL STRIP.AUTO: NEGATIVE
KETONES UR STRIP-MCNC: NEGATIVE MG/DL
LEUKOCYTE ESTERASE UR QL STRIP: NEGATIVE
NITRITE UR QL STRIP: NEGATIVE
PH UR STRIP.AUTO: 5.5 [PH]
PROT UR STRIP-MCNC: NEGATIVE MG/DL
SL AMB  POCT GLUCOSE, UA: ABNORMAL
SL AMB LEUKOCYTE ESTERASE,UA: ABNORMAL
SL AMB POCT BILIRUBIN,UA: ABNORMAL
SL AMB POCT BLOOD,UA: ABNORMAL
SL AMB POCT CLARITY,UA: ABNORMAL
SL AMB POCT COLOR,UA: YELLOW
SL AMB POCT KETONES,UA: ABNORMAL
SL AMB POCT NITRITE,UA: ABNORMAL
SL AMB POCT PH,UA: 5
SL AMB POCT SPECIFIC GRAVITY,UA: 1.01
SL AMB POCT URINE PROTEIN: ABNORMAL
SL AMB POCT UROBILINOGEN: ABNORMAL
SP GR UR STRIP.AUTO: 1.01 (ref 1–1.03)
UROBILINOGEN UR QL STRIP.AUTO: 0.2 E.U./DL

## 2021-05-27 PROCEDURE — 81003 URINALYSIS AUTO W/O SCOPE: CPT | Performed by: INTERNAL MEDICINE

## 2021-05-27 PROCEDURE — 1036F TOBACCO NON-USER: CPT | Performed by: INTERNAL MEDICINE

## 2021-05-27 PROCEDURE — 3008F BODY MASS INDEX DOCD: CPT | Performed by: INTERNAL MEDICINE

## 2021-05-27 PROCEDURE — 81002 URINALYSIS NONAUTO W/O SCOPE: CPT | Performed by: INTERNAL MEDICINE

## 2021-05-27 PROCEDURE — 99214 OFFICE O/P EST MOD 30 MIN: CPT | Performed by: INTERNAL MEDICINE

## 2021-05-27 RX ORDER — OMEPRAZOLE 20 MG/1
20 CAPSULE, DELAYED RELEASE ORAL
Qty: 30 CAPSULE | Refills: 5 | Status: SHIPPED | OUTPATIENT
Start: 2021-05-27

## 2021-05-27 NOTE — ASSESSMENT & PLAN NOTE
Ago paroxysmal atrial fibrillation in the past   On auscultation today heart rate and rhythm were normal   Patient remains on carvedilol  Blood pressure is stable

## 2021-05-27 NOTE — PROGRESS NOTES
Assessment/Plan:    Generalized abdominal pain  Patient is here today for evaluation  States that she began to experience over the weekend some generalized abdominal pain and discomfort diffusely period she states this is similar to discomfort she had in the past when she had a Helicobacter pylori infection  Some slight nausea, decreased appetite, no hematemesis no black stools or blood in the stools  She did have some episodes of diarrhea and then some constipation but she did move her bowels today  Bloated feeling  No fever no chills no back pain  On exam today patient has some diffuse abdominal tenderness throughout all quadrants of her abdomen no rebound rigidity and she does have normal bowel sounds  We did check a urinalysis and this was essentially negative except for small amounts of blood and she is just gotten over her menstrual cycle  The decision today was to treat and patient was placed on Prilosec 20 mg daily  We will be checking a stool for H pylori  Have placed a consult for her to be seen by her previous gastroenterologist for evaluation  She was told with the weekend coming up, holiday weekend that if she is still having difficulties worsening problems over the weekend please call period red flags would be increasing pain, fever, chills, black stools or blood in the stools, hematemesis  We will follow up with the patient approximately 1 week    Paroxysmal atrial fibrillation (HCC)   Ago paroxysmal atrial fibrillation in the past   On auscultation today heart rate and rhythm were normal   Patient remains on carvedilol  Blood pressure is stable  Diagnoses and all orders for this visit:    Generalized abdominal pain  -     H  pylori antigen, stool; Future  -     omeprazole (PriLOSEC) 20 mg delayed release capsule; Take 1 capsule (20 mg total) by mouth daily before breakfast  -     Ambulatory referral to Gastroenterology;  Future  -     POCT urine dip  -     UA w Reflex to Microscopic w Reflex to Culture - Clinic Collect    Paroxysmal atrial fibrillation [facility])          Subjective:      Patient ID: Pelon Naylor is a 35 y o  female  Patient is a 59-year-old female history of medical problems as outlined previously  Patient is here for evaluation complaining of abdominal pain which she states has been constant through the weekend up until today  She does have some slight nausea no fever no chills no black stools or blood the stools, did have alternating diarrhea and constipation but did move her bowels this morning  She states her discomfort is very similar to when she had Helicobacter pylori infection in the past    Abdominal Pain  This is a recurrent problem  The current episode started yesterday  The onset quality is sudden  The problem occurs constantly  The problem has been waxing and waning  The pain is located in the epigastric region  The pain is at a severity of 8/10  The quality of the pain is aching, cramping, a sensation of fullness and sharp  The abdominal pain radiates to the LLQ, LUQ, RLQ, RUQ, epigastric region, periumbilical region, suprapubic region and back  Associated symptoms include anorexia, belching, constipation, diarrhea, headaches and nausea  Pertinent negatives include no vomiting  The pain is aggravated by certain positions, drinking alcohol and eating  The pain is relieved by nothing  The following portions of the patient's history were reviewed and updated as appropriate:   She  has a past medical history of Allergic rhinitis, Anxiety, Atrial fibrillation ([address] ), Bunion (1985), Candidal vulvovaginitis, Cardiomyopathy ([address] ), Chest pain, CHF (congestive heart failure) ([address] ), Coronary artery disease, Herpes simplex, Irregular heart beat, LBBB (left bundle branch block), Normal delivery, and Pacemaker    She   Patient Active Problem List    Diagnosis Date Noted    Generalized abdominal pain 05/27/2021    Night sweats 03/01/2021    Parox[facility]mal [facility] ) 11/20/2020    Exposure to COVID-19 virus 11/13/2020    Class 1 obesity due to excess calories with serious comorbidity and body mass index (BMI) of 32 0 to 32 9 in adult 08/26/2019    Lipodystrophy 05/30/2019    Chest pain on breathing 02/19/2019    Screening for heart disease 02/19/2019    Annual physical exam 02/19/2019    Menorrhagia with regular cycle 01/04/2016    [facility]omy[facility] ([facility] ) 07/17/2013    Left bundle-branch block 07/17/2013    Mitral regurgitation 07/17/2013    Anxiety 12/20/2012     She  has a past surgical history that includes Cardiac defibrillator placement; Tubal ligation (2009); and pr excise excess skin tissue,abdomen, add-on (N/A, 5/29/2019)  Her family history includes Diabetes in her father; Heart disease in her father; Hyperlipidemia in her father; Hypertension in her father and mother; No Known Problems in her brother, brother, daughter, maternal aunt, maternal grandfather, maternal grandmother, paternal aunt, paternal aunt, paternal aunt, paternal grandfather, paternal grandmother, and son  She  reports that she has never smoked  She has never used smokeless tobacco  She reports current alcohol use of about 2 0 standard drinks of alcohol per week  She reports that she does not use drugs  Current Outpatient Medications   Medication Sig Dispense Refill    ALPRAZolam (XANAX) 0 25 mg tablet TAKE 1 TABLET (0 25 MG TOTAL) BY MOUTH 3 (THREE) TIMES A DAY AS NEEDED FOR ANXIETY  30 tablet 0    carvedilol (COREG) 25 mg tablet Take 1 tablet (25 mg total) by mouth 2 (two) times a day 180 tablet 3    lisinopril (ZESTRIL) 5 mg tablet Take 1 tablet (5 mg total) by mouth 2 (two) times a day 180 tablet 3    omeprazole (PriLOSEC) 20 mg delayed release capsule Take 1 capsule (20 mg total) by mouth daily before breakfast 30 capsule 5     No current facility-administered medications for this visit        Current Outpatient Medications on File Prior to Visit   Medication Sig  ALPRAZolam (XANAX) 0 25 mg tablet TAKE 1 TABLET (0 25 MG TOTAL) BY MOUTH 3 (THREE) TIMES A DAY AS NEEDED FOR ANXIETY   carvedilol (COREG) 25 mg tablet Take 1 tablet (25 mg total) by mouth 2 (two) times a day    lisinopril (ZESTRIL) 5 mg tablet Take 1 tablet (5 mg total) by mouth 2 (two) times a day     No current facility-administered medications on file prior to visit  She has No Known Allergies       Review of Systems   Constitutional: Negative  HENT: Negative  Eyes: Negative  Respiratory: Negative  Cardiovascular: Negative  Gastrointestinal: Positive for abdominal distention ( a bloated feeling to the abdomen), abdominal pain, anorexia, constipation, diarrhea and nausea  Negative for anal bleeding, blood in stool, rectal pain and vomiting  Endocrine: Negative  Genitourinary: Negative  Musculoskeletal: Negative  Skin: Negative  Allergic/Immunologic: Negative  Neurological: Positive for headaches  Negative for dizziness, tremors, seizures, syncope, facial asymmetry, speech difficulty, weakness, light-headedness and numbness  Hematological: Negative  Psychiatric/Behavioral: Negative  Objective:      /72   Pulse 79   Temp 97 7 °F (36 5 °C)   Ht 5' 1 42" (1 56 m)   Wt 77 6 kg (171 lb)   SpO2 99%   BMI 31 87 kg/m²          Physical Exam  Vitals signs and nursing note reviewed  Constitutional:       General: She is not in acute distress  Appearance: Normal appearance  She is obese  She is not ill-appearing, toxic-appearing or diaphoretic  Comments: Pleasant articulate, obese 57-year-old female who is awake alert somewhat uncomfortable but in no acute distress   HENT:      Head: Normocephalic and atraumatic  Right Ear: Tympanic membrane, ear canal and external ear normal  There is no impacted cerumen  Left Ear: Tympanic membrane, ear canal and external ear normal  There is no impacted cerumen        Nose: Nose normal  No congestion or rhinorrhea  Mouth/Throat:      Mouth: Mucous membranes are moist       Pharynx: Oropharynx is clear  No oropharyngeal exudate or posterior oropharyngeal erythema  Eyes:      General:         Right eye: No discharge  Left eye: No discharge  Extraocular Movements: Extraocular movements intact  Conjunctiva/sclera: Conjunctivae normal       Pupils: Pupils are equal, round, and reactive to light  Neck:      Musculoskeletal: Normal range of motion and neck supple  No neck rigidity or muscular tenderness  Vascular: No carotid bruit  Cardiovascular:      Rate and Rhythm: Normal rate and regular rhythm  Pulses: Normal pulses  Heart sounds: Normal heart sounds  No murmur  No friction rub  No gallop  Pulmonary:      Effort: Pulmonary effort is normal  No respiratory distress  Breath sounds: Normal breath sounds  No stridor  No wheezing, rhonchi or rales  Chest:      Chest wall: No tenderness  Abdominal:      General: Bowel sounds are normal  There is distension  Palpations: Abdomen is soft  There is no mass  Tenderness: There is abdominal tenderness ( generalized tenderness through all 4 quadrants but no rebound rigidity or masses)  There is no right CVA tenderness, left CVA tenderness, guarding or rebound  Hernia: No hernia is present  Comments: Obese   Musculoskeletal: Normal range of motion  General: No swelling, tenderness, deformity or signs of injury  Right lower leg: No edema  Left lower leg: No edema  Lymphadenopathy:      Cervical: No cervical adenopathy  Skin:     General: Skin is warm and dry  Capillary Refill: Capillary refill takes less than 2 seconds  Coloration: Skin is not jaundiced or pale  Findings: No bruising, erythema, lesion or rash  Neurological:      General: No focal deficit present  Mental Status: She is alert and oriented to person, place, and time  Mental status is at baseline  Psychiatric:         Behavior: Behavior normal          Thought Content:  Thought content normal          Judgment: Judgment normal       Comments: Slightly anxious mood and affect

## 2021-05-27 NOTE — ASSESSMENT & PLAN NOTE
Patient is here today for evaluation  States that she began to experience over the weekend some generalized abdominal pain and discomfort diffusely period she states this is similar to discomfort she had in the past when she had a Helicobacter pylori infection  Some slight nausea, decreased appetite, no hematemesis no black stools or blood in the stools  She did have some episodes of diarrhea and then some constipation but she did move her bowels today  Bloated feeling  No fever no chills no back pain  On exam today patient has some diffuse abdominal tenderness throughout all quadrants of her abdomen no rebound rigidity and she does have normal bowel sounds  We did check a urinalysis and this was essentially negative except for small amounts of blood and she is just gotten over her menstrual cycle  The decision today was to treat and patient was placed on Prilosec 20 mg daily  We will be checking a stool for H pylori  Have placed a consult for her to be seen by her previous gastroenterologist for evaluation  She was told with the weekend coming up, holiday weekend that if she is still having difficulties worsening problems over the weekend please call period red flags would be increasing pain, fever, chills, black stools or blood in the stools, hematemesis    We will follow up with the patient approximately 1 week

## 2021-05-28 ENCOUNTER — RA CDI HCC (OUTPATIENT)
Dept: OTHER | Facility: HOSPITAL | Age: 41
End: 2021-05-28

## 2021-05-28 ENCOUNTER — APPOINTMENT (OUTPATIENT)
Dept: LAB | Age: 41
End: 2021-05-28
Payer: COMMERCIAL

## 2021-05-28 DIAGNOSIS — R10.84 GENERALIZED ABDOMINAL PAIN: ICD-10-CM

## 2021-05-28 PROCEDURE — 87338 HPYLORI STOOL AG IA: CPT

## 2021-05-28 NOTE — PROGRESS NOTES
[facility]  coding opportunities          Chart reviewed, no opportunity found: CHART REVIEWED, NO OPPORTUNITY FOUND              Patients insurance company: [employer] (Medicare Advantage and Commercial)

## 2021-05-29 LAB — H PYLORI AG STL QL IA: NEGATIVE

## 2021-06-24 ENCOUNTER — REMOTE DEVICE CLINIC VISIT (OUTPATIENT)
Dept: CARDIOLOGY CLINIC | Facility: CLINIC | Age: 41
End: 2021-06-24
Payer: COMMERCIAL

## 2021-06-24 DIAGNOSIS — Z95.0 PACEMAKER: Primary | ICD-10-CM

## 2021-06-24 PROCEDURE — 93296 REM INTERROG EVL PM/IDS: CPT | Performed by: INTERNAL MEDICINE

## 2021-06-24 PROCEDURE — 93294 REM INTERROG EVL PM/LDLS PM: CPT | Performed by: INTERNAL MEDICINE

## 2021-06-24 NOTE — PROGRESS NOTES
Results for orders placed or performed in visit on 06/24/21   Cardiac EP device report    Narrative    MDT/CRT-D (DDD MODE)  CARELINK TRANSMISSION: BATTERY ADEQUATE (2 4 YRS)  AP 0%;  99% (LBBB/DDD 50)  ALL LEAD PARAMETERS WITHIN NORMAL LIMITS  NO EPISODES  OPTI-VOL WITHIN NORMAL LIMITS  NORMAL DEVICE FUNCTION   PL

## 2021-09-23 ENCOUNTER — REMOTE DEVICE CLINIC VISIT (OUTPATIENT)
Dept: CARDIOLOGY CLINIC | Facility: CLINIC | Age: 41
End: 2021-09-23
Payer: COMMERCIAL

## 2021-09-23 DIAGNOSIS — Z95.810 AICD (AUTOMATIC CARDIOVERTER/DEFIBRILLATOR) PRESENT: Primary | ICD-10-CM

## 2021-09-23 PROCEDURE — 93295 DEV INTERROG REMOTE 1/2/MLT: CPT | Performed by: INTERNAL MEDICINE

## 2021-09-23 PROCEDURE — 93296 REM INTERROG EVL PM/IDS: CPT | Performed by: INTERNAL MEDICINE

## 2021-09-23 NOTE — PROGRESS NOTES
Results for orders placed or performed in visit on 09/23/21   Cardiac EP device report    Narrative    MDT/CRT-D (DDD MODE)  CARELINK TRANSMISSION: BATTERY STATUS "2 YRS " AP 0%  98% VSRP 2%  ALL AVAILABLE LEAD PARAMETERS WITHIN NORMAL LIMITS  NO SIGNIFICANT HIGH RATE EPISODES  [address]; MARKERS ONLY  NORMAL DEVICE FUNCTION   NC         Current Outpatient Medications:     ALPRAZolam (XANAX) 0 25 mg tablet, TAKE 1 TABLET (0 25 MG TOTAL) BY MOUTH 3 (THREE) TIMES A DAY AS NEEDED FOR ANXIETY , Disp: 30 tablet, Rfl: 0    carvedilol (COREG) 25 mg tablet, Take 1 tablet (25 mg total) by mouth 2 (two) times a day, Disp: 180 tablet, Rfl: 3    lisinopril (ZESTRIL) 5 mg tablet, Take 1 tablet (5 mg total) by mouth 2 (two) times a day, Disp: 180 tablet, Rfl: 3    omeprazole (PriLOSEC) 20 mg delayed release capsule, Take 1 capsule (20 mg total) by mouth daily before breakfast, Disp: 30 capsule, Rfl: 5

## 2021-10-04 ENCOUNTER — OFFICE VISIT (OUTPATIENT)
Dept: PODIATRY | Facility: CLINIC | Age: 41
End: 2021-10-04
Payer: COMMERCIAL

## 2021-10-04 VITALS
WEIGHT: 182 LBS | HEIGHT: 61 IN | HEART RATE: 80 BPM | BODY MASS INDEX: 34.36 KG/M2 | DIASTOLIC BLOOD PRESSURE: 78 MMHG | SYSTOLIC BLOOD PRESSURE: 111 MMHG

## 2021-10-04 DIAGNOSIS — M20.11 HALLUX ABDUCTO VALGUS, RIGHT: Primary | ICD-10-CM

## 2021-10-04 DIAGNOSIS — M79.671 RIGHT FOOT PAIN: ICD-10-CM

## 2021-10-04 PROCEDURE — 99203 OFFICE O/P NEW LOW 30 MIN: CPT | Performed by: PODIATRIST

## 2021-10-04 PROCEDURE — 1036F TOBACCO NON-USER: CPT | Performed by: PODIATRIST

## 2021-10-04 PROCEDURE — 3008F BODY MASS INDEX DOCD: CPT | Performed by: PODIATRIST

## 2021-12-16 DIAGNOSIS — I42.0 DILATED CARDIOMYOPATHY (HCC): ICD-10-CM

## 2021-12-18 RX ORDER — LISINOPRIL 5 MG/1
TABLET ORAL
Qty: 180 TABLET | Refills: 3 | Status: SHIPPED | OUTPATIENT
Start: 2021-12-18 | End: 2022-04-01 | Stop reason: SDUPTHER

## 2021-12-29 DIAGNOSIS — I42.0 DILATED CARDIOMYOPATHY (HCC): ICD-10-CM

## 2021-12-29 RX ORDER — CARVEDILOL 25 MG/1
25 TABLET ORAL 2 TIMES DAILY
Qty: 180 TABLET | Refills: 0 | Status: SHIPPED | OUTPATIENT
Start: 2021-12-29 | End: 2022-01-05 | Stop reason: SDUPTHER

## 2022-01-05 DIAGNOSIS — I42.0 DILATED CARDIOMYOPATHY (HCC): ICD-10-CM

## 2022-01-06 RX ORDER — CARVEDILOL 25 MG/1
25 TABLET ORAL 2 TIMES DAILY
Qty: 180 TABLET | Refills: 4 | OUTPATIENT
Start: 2022-01-06 | End: 2022-03-28

## 2022-01-14 ENCOUNTER — IN-CLINIC DEVICE VISIT (OUTPATIENT)
Dept: CARDIOLOGY CLINIC | Facility: CLINIC | Age: 42
End: 2022-01-14
Payer: COMMERCIAL

## 2022-01-14 DIAGNOSIS — Z95.810 PRESENCE OF AUTOMATIC CARDIOVERTER/DEFIBRILLATOR (AICD): Primary | ICD-10-CM

## 2022-01-14 PROCEDURE — 93284 PRGRMG EVAL IMPLANTABLE DFB: CPT | Performed by: INTERNAL MEDICINE

## 2022-01-14 NOTE — PROGRESS NOTES
Results for orders placed or performed in visit on 01/14/22   Cardiac EP device report    Narrative    MDT/CRT-D (DDD MODE)  DEVICE INTERROGATED IN THE [place] OFFICE/YEARLY  BATTERY ADEQUATE (1 9 YRS)  AP 0%; BVP 98%  ALL LEAD PARAMETERS WITHIN NORMAL LIMITS  1 VHR EPISODE (UP  BPM/<1 SECOND/AVAILABLE FOR REVIEW/OVERSENSING NOTED)  ISOMETRICS EXERCISES DONE WITH NO NOISE NOTED  AMPLITUDE TESTING DONE WITH NORMAL RESULTS ON BOTH UNIPOLAR & BIPOLAR SETTING   PT  TAKES CARVEDILOL  WAVELET TEMPLATE UPDATED  OPTI-VOL WITHIN NORMAL LIMITS  NO PROGRAMMING CHANGES MADE TO DEVICE PARAMETERS  NORMAL DEVICE FUNCTION   PL

## 2022-01-17 ENCOUNTER — IN-CLINIC DEVICE VISIT (OUTPATIENT)
Dept: CARDIOLOGY CLINIC | Facility: CLINIC | Age: 42
End: 2022-01-17

## 2022-01-17 DIAGNOSIS — Z95.810 PRESENCE OF AUTOMATIC CARDIOVERTER/DEFIBRILLATOR (AICD): Primary | ICD-10-CM

## 2022-01-17 PROCEDURE — RECHECK: Performed by: INTERNAL MEDICINE

## 2022-01-17 NOTE — PROGRESS NOTES
Results for orders placed or performed in visit on 01/17/22   Cardiac EP device report    Narrative    MDT/CRT-D (DDD MODE)  DEVICE INTERROGATED IN THE [place] OFFICE  N/B-- PROGRAMMING CHANGES TO SENSITIVITY PER DR MURPHY  -- SENSITIVITY CHANGED FROM   30 TO  45  NORMAL DEVICE FUNCTION  ----HEIN

## 2022-01-24 ENCOUNTER — REMOTE DEVICE CLINIC VISIT (OUTPATIENT)
Dept: CARDIOLOGY CLINIC | Facility: CLINIC | Age: 42
End: 2022-01-24

## 2022-01-24 DIAGNOSIS — Z95.810 PRESENCE OF AUTOMATIC CARDIOVERTER/DEFIBRILLATOR (AICD): Primary | ICD-10-CM

## 2022-01-24 PROCEDURE — RECHECK: Performed by: INTERNAL MEDICINE

## 2022-01-24 NOTE — PROGRESS NOTES
Results for orders placed or performed in visit on 01/24/22   Cardiac EP device report    Narrative    MDT/CRT-D (DDD MODE)  N/B; CARELINK TRANSMISSION: 1 WEEK OVERSENSING CHECK; BATTERY VOLTAGE ADEQUATE (1 9 YRS)  AP <0 1% BVP 99 8% ( 98 5% + VSR PACE 1 3%)  ALL AVAILABLE LEAD PARAMETERS WITHIN NORMAL LIMITS  NO SIGNIFICANT HIGH RATE EPISODES  24 V-SENSE EPISODES WITH MARKERS ON EPISODE #6502 SHOWING (1) BEAT T-WAVE OVERSENSING ON V CHANNEL  SENSITIVITY REPROGRAMMING DONE 1/17/22  OPTI-VOL WITHIN NORMAL LIMITS  NORMAL DEVICE FUNCTION    ES

## 2022-01-25 ENCOUNTER — OFFICE VISIT (OUTPATIENT)
Dept: CARDIOLOGY CLINIC | Facility: CLINIC | Age: 42
End: 2022-01-25
Payer: COMMERCIAL

## 2022-01-25 VITALS
WEIGHT: 181.6 LBS | HEART RATE: 73 BPM | HEIGHT: 61 IN | DIASTOLIC BLOOD PRESSURE: 76 MMHG | SYSTOLIC BLOOD PRESSURE: 110 MMHG | BODY MASS INDEX: 34.29 KG/M2 | OXYGEN SATURATION: 98 %

## 2022-01-25 DIAGNOSIS — I34.0 NONRHEUMATIC MITRAL VALVE REGURGITATION: ICD-10-CM

## 2022-01-25 DIAGNOSIS — I42.0 DILATED CARDIOMYOPATHY (HCC): ICD-10-CM

## 2022-01-25 DIAGNOSIS — I44.7 LEFT BUNDLE-BRANCH BLOCK: Primary | ICD-10-CM

## 2022-01-25 DIAGNOSIS — I48.0 PAROXYSMAL ATRIAL FIBRILLATION (HCC): ICD-10-CM

## 2022-01-25 DIAGNOSIS — I42.8 NONISCHEMIC CARDIOMYOPATHY (HCC): ICD-10-CM

## 2022-01-25 PROCEDURE — 3008F BODY MASS INDEX DOCD: CPT | Performed by: INTERNAL MEDICINE

## 2022-01-25 PROCEDURE — 1036F TOBACCO NON-USER: CPT | Performed by: INTERNAL MEDICINE

## 2022-01-25 PROCEDURE — 99214 OFFICE O/P EST MOD 30 MIN: CPT | Performed by: INTERNAL MEDICINE

## 2022-01-28 NOTE — PROGRESS NOTES
Cardiology Follow Up    Billy Meek  1980  [id number]  [facility] [place] [address] [place]  [facility] [address] [place] [address]  [phone]    1  Left bundle-branch block     2  Nonischemic cardiomyopathy ([address] )  Echo complete w/ contrast if indicated   3  Dilated cardiomyopathy ([address] )     4  Nonrheumatic mitral valve regurgitation     5  Paroxysmal atrial fibrillation (HCC)           Discussion/Summary: I do not feel the her weight gain is from fluid / CHF  I will repeat an echo  For now ,she will stay on the same medications  I encouraged her to get more active and get her weight down  RTO 1 year  Interval History: She has not had any cardiac problems since her last OV  She denies CP, significant SOB, LE edema  Her weight is up to 181 lbs  Her BiV ICD is functioning normally  She has a non ischemic CM with EF 45 % by last echo in 12/2018  /76      Patient Active Problem List   Diagnosis    Anxiety    Cardiomyopathy ([address] )    Left bundle-branch block    Menorrhagia with regular cycle    Mitral regurgitation    Chest pain on breathing    Screening for heart disease    Annual physical exam    Lipodystrophy    Class 1 obesity due to excess calories with serious comorbidity and body mass index (BMI) of 32 0 to 32 9 in adult    Exposure to COVID-19 virus    Paroxysmal atrial fibrillation (HCC)    Night sweats    Generalized abdominal pain     Past Medical History:   Diagnosis Date    Allergic rhinitis     Anxiety     Atrial fibrillation (HCC)     paroxysmal    Bunion 1985    Candidal vulvovaginitis     Cardiomyopathy ([address] )     Chest pain     CHF (congestive heart failure) (HCC)     chronic systolic    Coronary artery disease     Herpes simplex     Irregular heart beat     LBBB (left bundle branch block)     Normal delivery     2005 son, 2008 daughter    Pacemaker      Social History Socioeconomic History    Marital status: /Civil Union     Spouse name: Not on file    Number of children: Not on file    Years of education: Not on file    Highest education level: Not on file   Occupational History    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   Tobacco Use    Smoking status: Never Smoker    Smokeless tobacco: Never Used    Tobacco comment: history of second hand smoke exposure as a child   Substance and Sexual Activity    Alcohol use:  Yes     Alcohol/week: 2 0 standard drinks     Types: 2 Glasses of wine per week     Comment: social    Drug use: No    Sexual activity: Yes     Partners: Male     Birth control/protection: Female Sterilization   Other Topics Concern    Not on file   Social History Narrative    Lives with her  and 2 children    Works full time     Exercises 4-5 times per week    Reports a healthy/balanced diet     Social Determinants of Health     Financial Resource Strain: Not on file   Food Insecurity: Not on file   Transportation Needs: Not on file   Physical Activity: Not on file   Stress: Not on file   Social Connections: Not on file   Intimate Partner Violence: Not on file   Housing Stability: Not on file      Family History   Problem Relation Age of Onset    Diabetes Father     Hypertension Father     Heart disease Father     Hyperlipidemia Father     Hypertension Mother     No Known Problems Brother     No Known Problems Brother     No Known Problems Son     No Known Problems Daughter     No Known Problems Maternal Grandmother     No Known Problems Maternal Grandfather     No Known Problems Paternal Grandmother     No Known Problems Paternal Grandfather     No Known Problems Maternal Aunt     No Known Problems Paternal Aunt     No Known Problems Paternal Aunt     No Known Problems Paternal Aunt      Past Surgical History:   Procedure Laterality Date    CARDIAC DEFIBRILLATOR PLACEMENT      VT EXCISE EXCESS SKIN TISSUE,ABDOMEN, ADD-ON N/A 5/29/2019    Procedure: CIRCUMFERENTIAL ABDOMINOPLASTY;  Surgeon: Shana Sharp MD;  Location: [address] [place] [address];  Service: Plastics    TUBAL LIGATION  2009       Current Outpatient Medications:     carvedilol (COREG) 25 mg tablet, Take 1 tablet (25 mg total) by mouth 2 (two) times a day, Disp: 180 tablet, Rfl: 4    lisinopril (ZESTRIL) 5 mg tablet, TAKE 1 TABLET BY MOUTH TWICE DAILY, Disp: 180 tablet, Rfl: 3    ALPRAZolam (XANAX) 0 25 mg tablet, TAKE 1 TABLET (0 25 MG TOTAL) BY MOUTH 3 (THREE) TIMES A DAY AS NEEDED FOR ANXIETY  (Patient not taking: Reported on 10/4/2021), Disp: 30 tablet, Rfl: 0    omeprazole (PriLOSEC) 20 mg delayed release capsule, Take 1 capsule (20 mg total) by mouth daily before breakfast (Patient not taking: Reported on 1/25/2022 ), Disp: 30 capsule, Rfl: 5  No Known Allergies  Vitals:    01/25/22 1640   BP: 110/76   BP Location: Left arm   Patient Position: Sitting   Cuff Size: Standard   Pulse: 73   SpO2: 98%   Weight: 82 4 kg (181 lb 9 6 oz)   Height: 5' 1" (1 549 m)     Weight (last 2 days)     None         Blood pressure 110/76, pulse 73, height 5' 1" (1 549 m), weight 82 4 kg (181 lb 9 6 oz), SpO2 98 %, not currently breastfeeding , Body mass index is 34 31 kg/m²  Labs:  No visits with results within 6 Month(s) from this visit  Latest known visit with results is:   Appointment on 05/28/2021   Component Date Value    H pylori Ag, Stl 05/28/2021 Negative      Imaging: Cardiac EP device report    Result Date: 1/24/2022  Narrative: MDT/CRT-D (DDD MODE) N/B; CARELINK TRANSMISSION: 1 WEEK OVERSENSING CHECK; BATTERY VOLTAGE ADEQUATE (1 9 YRS)  AP <0 1% BVP 99 8% ( 98 5% + VSR PACE 1 3%)  ALL AVAILABLE LEAD PARAMETERS WITHIN NORMAL LIMITS  NO SIGNIFICANT HIGH RATE EPISODES  24 V-SENSE EPISODES WITH MARKERS ON EPISODE #6565 SHOWING (1) BEAT T-WAVE OVERSENSING ON V CHANNEL  SENSITIVITY REPROGRAMMING DONE 1/17/22  OPTI-VOL WITHIN NORMAL LIMITS  NORMAL DEVICE FUNCTION    ES     Cardiac EP device report    Result Date: 1/17/2022  Narrative: MDT/CRT-D (DDD MODE) DEVICE INTERROGATED IN THE [place] OFFICE  N/B-- PROGRAMMING CHANGES TO SENSITIVITY PER DR MURPHY  -- SENSITIVITY CHANGED FROM   30 TO  45  NORMAL DEVICE FUNCTION  ----HEIN     Cardiac EP device report    Result Date: 1/14/2022  Narrative: MDT/CRT-D (DDD MODE) DEVICE INTERROGATED IN THE [facility] OFFICE/YEARLY  BATTERY ADEQUATE (1 9 YRS)  AP 0%; BVP 98%  ALL LEAD PARAMETERS WITHIN NORMAL LIMITS  1 VHR EPISODE (UP  BPM/<1 SECOND/AVAILABLE FOR REVIEW/OVERSENSING NOTED)  ISOMETRICS EXERCISES DONE WITH NO NOISE NOTED  AMPLITUDE TESTING DONE WITH NORMAL RESULTS ON BOTH UNIPOLAR & BIPOLAR SETTING   PT  TAKES CARVEDILOL  WAVELET TEMPLATE UPDATED  OPTI-VOL WITHIN NORMAL LIMITS  NO PROGRAMMING CHANGES MADE TO DEVICE PARAMETERS  NORMAL DEVICE FUNCTION  PL       Review of Systems:  Review of Systems   Constitutional: Negative for diaphoresis, fatigue, fever and unexpected weight change  HENT: Negative  Respiratory: Negative for cough, shortness of breath and wheezing  Cardiovascular: Negative for chest pain, palpitations and leg swelling  Gastrointestinal: Negative for abdominal pain, diarrhea and nausea  Musculoskeletal: Negative for gait problem and myalgias  Skin: Negative for rash  Neurological: Negative for dizziness and numbness  Psychiatric/Behavioral: Negative  Physical Exam:  Physical Exam  Constitutional:       Appearance: She is well-developed  HENT:      Head: Normocephalic and atraumatic  Eyes:      Pupils: Pupils are equal, round, and reactive to light  Neck:      Vascular: No JVD  Cardiovascular:      Rate and Rhythm: Regular rhythm  Pulses: Normal pulses  Carotid pulses are 2+ on the right side and 2+ on the left side  Heart sounds: S1 normal and S2 normal    Pulmonary:      Effort: Pulmonary effort is normal       Breath sounds: Normal breath sounds  No wheezing or rales     Abdominal: General: Bowel sounds are normal       Palpations: Abdomen is soft  Tenderness: There is no abdominal tenderness  Musculoskeletal:         General: No tenderness  Normal range of motion  Cervical back: Normal range of motion and neck supple  Skin:     General: Skin is warm  Neurological:      Mental Status: She is alert and oriented to person, place, and time  Cranial Nerves: No cranial nerve deficit  Deep Tendon Reflexes: Reflexes are normal and symmetric

## 2022-02-17 ENCOUNTER — HOSPITAL ENCOUNTER (OUTPATIENT)
Dept: NON INVASIVE DIAGNOSTICS | Facility: CLINIC | Age: 42
Discharge: HOME | End: 2022-02-17
Payer: COMMERCIAL

## 2022-02-17 VITALS
HEIGHT: 61 IN | WEIGHT: 181 LBS | BODY MASS INDEX: 34.17 KG/M2 | HEART RATE: 69 BPM | DIASTOLIC BLOOD PRESSURE: 76 MMHG | SYSTOLIC BLOOD PRESSURE: 110 MMHG

## 2022-02-17 DIAGNOSIS — I42.8 NONISCHEMIC CARDIOMYOPATHY (HCC): ICD-10-CM

## 2022-02-17 LAB
AORTIC ROOT: 2.6 CM
APICAL FOUR CHAMBER EJECTION FRACTION: 43 %
E WAVE DECELERATION TIME: 172 MS
FRACTIONAL SHORTENING: 20 % (ref 28–44)
GLOBAL LONGITUIDAL STRAIN: -16 %
INTERVENTRICULAR SEPTUM IN DIASTOLE (PARASTERNAL SHORT AXIS VIEW): 0.7 CM (ref 0.52–0.98)
INTERVENTRICULAR SEPTUM: 0.7 CM (ref 0.6–1.1)
LAAS-AP2: 16.8 CM2
LAAS-AP4: 15 CM2
LEFT ATRIUM AREA SYSTOLE SINGLE PLANE A4C: 14.1 CM2
LEFT ATRIUM SIZE: 3.2 CM
LEFT INTERNAL DIMENSION IN SYSTOLE: 3.6 CM (ref 2.71–4.11)
LEFT VENTRICLE DIASTOLIC VOLUME (MOD BIPLANE): 132 ML (ref 89.06–200.57)
LEFT VENTRICLE SYSTOLIC VOLUME (MOD BIPLANE): 69 ML
LEFT VENTRICULAR INTERNAL DIMENSION IN DIASTOLE: 4.5 CM (ref 4.44–6.61)
LEFT VENTRICULAR POSTERIOR WALL IN END DIASTOLE: 0.7 CM (ref 0.51–0.97)
LEFT VENTRICULAR STROKE VOLUME: 39 ML
LV EF: 48 %
LVSV (TEICH): 39 ML
MV E'TISSUE VEL-SEP: 10 CM/S
MV PEAK A VEL: 0.6 M/S
MV PEAK E VEL: 92 CM/S
MV STENOSIS PRESSURE HALF TIME: 0 MS
RIGHT ATRIUM AREA SYSTOLE A4C: 12.8 CM2
RIGHT VENTRICLE ID DIMENSION: 3.1 CM
SL CV LEFT ATRIUM LENGTH A2C: 4.5 CM
SL CV LV DIAS VOL ENDO Z SCORE: -0.46
SL CV LV EF: 50
SL CV PED ECHO LEFT VENTRICLE DIASTOLIC VOLUME (MOD BIPLANE) 2D: 95 ML
SL CV PED ECHO LEFT VENTRICLE SYSTOLIC VOLUME (MOD BIPLANE) 2D: 55 ML
TR MAX PG: 19 MMHG
TR PEAK VELOCITY: 2.2 M/S
TRICUSPID VALVE PEAK REGURGITATION VELOCITY: 2.2 M/S
Z-SCORE OF INTERVENTRICULAR SEPTUM IN END DIASTOLE: -0.45
Z-SCORE OF LEFT VENTRICULAR DIMENSION IN END DIASTOLE: -1.86
Z-SCORE OF LEFT VENTRICULAR DIMENSION IN END SYSTOLE: 0.6
Z-SCORE OF LEFT VENTRICULAR POSTERIOR WALL IN END DIASTOLE: -0.34

## 2022-02-17 PROCEDURE — 93306 TTE W/DOPPLER COMPLETE: CPT

## 2022-02-17 PROCEDURE — 93306 TTE W/DOPPLER COMPLETE: CPT | Performed by: INTERNAL MEDICINE

## 2022-03-03 ENCOUNTER — OFFICE VISIT (OUTPATIENT)
Dept: FAMILY MEDICINE CLINIC | Facility: CLINIC | Age: 42
End: 2022-03-03
Payer: COMMERCIAL

## 2022-03-03 VITALS
DIASTOLIC BLOOD PRESSURE: 80 MMHG | BODY MASS INDEX: 34.44 KG/M2 | SYSTOLIC BLOOD PRESSURE: 120 MMHG | HEART RATE: 83 BPM | WEIGHT: 182.4 LBS | TEMPERATURE: 97.6 F | OXYGEN SATURATION: 98 % | RESPIRATION RATE: 16 BRPM | HEIGHT: 61 IN

## 2022-03-03 DIAGNOSIS — E04.9 ENLARGED THYROID GLAND: ICD-10-CM

## 2022-03-03 DIAGNOSIS — F41.9 ANXIETY: ICD-10-CM

## 2022-03-03 DIAGNOSIS — E66.09 CLASS 1 OBESITY DUE TO EXCESS CALORIES WITH SERIOUS COMORBIDITY AND BODY MASS INDEX (BMI) OF 34.0 TO 34.9 IN ADULT: ICD-10-CM

## 2022-03-03 DIAGNOSIS — Z23 ENCOUNTER FOR IMMUNIZATION: ICD-10-CM

## 2022-03-03 DIAGNOSIS — I42.0 DILATED CARDIOMYOPATHY (HCC): ICD-10-CM

## 2022-03-03 DIAGNOSIS — E78.2 MIXED HYPERLIPIDEMIA: ICD-10-CM

## 2022-03-03 DIAGNOSIS — Z12.31 ENCOUNTER FOR SCREENING MAMMOGRAM FOR BREAST CANCER: ICD-10-CM

## 2022-03-03 DIAGNOSIS — E03.9 HYPOTHYROIDISM, UNSPECIFIED TYPE: ICD-10-CM

## 2022-03-03 DIAGNOSIS — I48.0 PAROXYSMAL ATRIAL FIBRILLATION (HCC): ICD-10-CM

## 2022-03-03 DIAGNOSIS — Z00.00 ANNUAL PHYSICAL EXAM: Primary | ICD-10-CM

## 2022-03-03 DIAGNOSIS — Z11.59 NEED FOR HEPATITIS C SCREENING TEST: ICD-10-CM

## 2022-03-03 PROCEDURE — 1036F TOBACCO NON-USER: CPT | Performed by: NURSE PRACTITIONER

## 2022-03-03 PROCEDURE — 3725F SCREEN DEPRESSION PERFORMED: CPT | Performed by: NURSE PRACTITIONER

## 2022-03-03 PROCEDURE — 3008F BODY MASS INDEX DOCD: CPT | Performed by: NURSE PRACTITIONER

## 2022-03-03 PROCEDURE — 99396 PREV VISIT EST AGE 40-64: CPT | Performed by: NURSE PRACTITIONER

## 2022-03-03 RX ORDER — ALPRAZOLAM 0.25 MG/1
0.25 TABLET ORAL 3 TIMES DAILY PRN
Qty: 30 TABLET | Refills: 0 | Status: SHIPPED | OUTPATIENT
Start: 2022-03-03

## 2022-03-03 NOTE — ASSESSMENT & PLAN NOTE
Vaccines declined, scheduled for mammo this year  She had all 3 doses of  covid vaccine, declines flu shot  Encourage healthy diet/exercise  Continue with annual gyn exams, dental exams, eye exams

## 2022-03-03 NOTE — PROGRESS NOTES
[address]    NAME: Maikel Marsh  AGE: 35 y o  SEX: female  : 1980     DATE: 3/3/2022     Assessment and Plan:     Problem List Items Addressed This Visit        Cardiovascular and Mediastinum    Cardiomyopathy ([address] )     Stable cardiac function, reviewed note from most recent cards f u  Continue with carvedilol, lisinopril  BP normal            Paroxysmal atrial fibrillation (HCC)     RRR on exam today, no palpitations or sob  Ongoing f u with cardiology  Continue carveddilol, lisinopril  Other    Annual physical exam - Primary     Vaccines declined, scheduled for mammo this year  She had all 3 doses of  covid vaccine, declines flu shot  Encourage healthy diet/exercise  Continue with annual gyn exams, dental exams, eye exams  Relevant Orders    Lipid panel    UA w Reflex to Microscopic w Reflex to Culture    CBC and differential    Basic metabolic panel    Anxiety     Stable on alprazolam prn  She states she only takes 1/2 tablet when sx are severe  No hx of overuse or misuse  Continue tx and monitoring  Relevant Medications    ALPRAZolam (XANAX) 0 25 mg tablet    Class 1 obesity due to excess calories with serious comorbidity and body mass index (BMI) of 34 0 to 34 9 in adult     Reinforced healthy diet, cut down snacking and start regular exercise  Other Visit Diagnoses     Need for hepatitis C screening test        Relevant Orders    Hepatitis C Antibody ([facility])    Encounter for immunization        Encounter for screening mammogram for breast cancer        Hypothyroidism, unspecified type        Mixed hyperlipidemia        Relevant Orders    Lipid panel    Enlarged thyroid gland        Relevant Orders    TSH, 3rd generation with Free T4 reflex          Immunizations and preventive care screenings were discussed with patient today   Appropriate education was printed on patient's after visit summary  Counseling:  Dental Health: discussed importance of regular tooth brushing, flossing, and dental visits  Injury prevention: discussed safety/seat belts, safety helmets, smoke detectors, carbon dioxide detectors, and smoking near bedding or upholstery  · Exercise: the importance of regular exercise/physical activity was discussed  Recommend exercise 3-5 times per week for at least 30 minutes  BMI Counseling: Body mass index is 34 46 kg/m²  The BMI is above normal  Nutrition recommendations include decreasing portion sizes, encouraging healthy choices of fruits and vegetables, consuming healthier snacks, moderation in carbohydrate intake, increasing intake of lean protein, reducing intake of saturated and trans fat and reducing intake of cholesterol  Exercise recommendations include moderate physical activity 150 minutes/week, exercising 3-5 times per week and strength training exercises  No pharmacotherapy was ordered  Rationale for BMI follow-up plan is due to patient being overweight or obese  Depression Screening and Follow-up Plan: Patient was screened for depression during today's encounter  They screened negative with a PHQ-2 score of 0  No follow-ups on file  Chief Complaint:     Chief Complaint   Patient presents with    Annual Exam     no concerns      History of Present Illness:     Adult Annual Physical   Patient here for a comprehensive physical exam  The patient reports no problems  Diet and Physical Activity  · Diet/Nutrition: well balanced diet, consuming 3-5 servings of fruits/vegetables daily, adequate fiber intake and adequate whole grain intake  · Exercise: no formal exercise        Depression Screening  PHQ-2/9 Depression Screening    Little interest or pleasure in doing things: 0 - not at all  Feeling down, depressed, or hopeless: 0 - not at all  PHQ-2 Score: 0  PHQ-2 Interpretation: Negative depression screen       General Health  · Sleep: sleeps well and gets more than 8 hours of sleep on average  · Hearing: normal - bilateral   · Vision: no vision problems, goes for regular eye exams, most recent eye exam <1 year ago and wears contacts  · Dental: regular dental visits and brushes teeth twice daily  /GYN Health  · Patient is: premenopausal  · Last menstrual period: 2/2022  · Contraceptive method: tubal ligation  Review of Systems:     Review of Systems   Constitutional: Negative for activity change, appetite change, chills, diaphoresis, fatigue, fever and unexpected weight change  HENT: Negative for hearing loss  Eyes: Negative for visual disturbance  Respiratory: Negative for cough, chest tightness and shortness of breath  Cardiovascular: Negative for chest pain, palpitations and leg swelling  Gastrointestinal: Negative for constipation, diarrhea, nausea and vomiting  Genitourinary: Negative for difficulty urinating, dysuria, frequency and menstrual problem  Musculoskeletal: Negative for arthralgias and myalgias  Allergic/Immunologic: Negative for environmental allergies  Neurological: Negative for dizziness, weakness, numbness and headaches  Psychiatric/Behavioral: Negative for dysphoric mood and sleep disturbance  The patient is not nervous/anxious         Past Medical History:     Past Medical History:   Diagnosis Date    Allergic rhinitis     Anxiety     Atrial fibrillation (HCC)     paroxysmal    Bunion 1985    Candidal vulvovaginitis     Cardiomy[facility] [address] )     Chest pain     [facility]congest[facility] heart failure) ([facility])     chronic systolic    Coronary artery disease     Herpes simplex     Irregular heart beat     LBBB (left bundle branch block)     Normal delivery     2005 son, 2008 daughter    Pacemaker       Past Surgical History:     Past Surgical History:   Procedure Laterality Date    CARDIAC DEFIBRILLATOR PLACEMENT      VT EXCISE EXCESS SKIN TISSUE,ABDOMEN, ADD-ON N/A 5/29/2019    Procedure: CIRCUMFERENTIAL ABDOMINOPLASTY;  Surgeon: Zenon Salguero MD;  Location: [facility] MAIN OR;  Service: Plastics    TUBAL LIGATION  2009      Social History:     Social History     Socioeconomic History    Marital status: /Civil Union     Spouse name: None    Number of children: None    Years of education: None    Highest education level: None   Occupational History    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   Tobacco Use    Smoking status: Never Smoker    Smokeless tobacco: Never Used    Tobacco comment: history of second hand smoke exposure as a child   Vaping Use    Vaping Use: Never used   Substance and Sexual Activity    Alcohol use:  Yes     Alcohol/week: 2 0 standard drinks     Types: 2 Glasses of wine per week     Comment: social    Drug use: No    Sexual activity: Yes     Partners: Male     Birth control/protection: Female Sterilization   Other Topics Concern    None   Social History Narrative    Lives with her  and 2 children    Works full time     Exercises 4-5 times per week    Reports a healthy/balanced diet     Social Determinants of Health     Financial Resource Strain: Not on file   Food Insecurity: Not on file   Transportation Needs: Not on file   Physical Activity: Not on file   Stress: Not on file   Social Connections: Not on file   Intimate Partner Violence: Not on file   Housing Stability: Not on file      Family History:     Family History   Problem Relation Age of Onset    Diabetes Father     Hypertension Father     Heart disease Father     Hyperlipidemia Father     Hypertension Mother     No Known Problems Brother     No Known Problems Brother     No Known Problems Son     No Known Problems Daughter     No Known Problems Maternal Grandmother     No Known Problems Maternal Grandfather     No Known Problems Paternal Grandmother     No Known Problems Paternal Grandfather     No Known Problems Maternal Aunt     No Known Problems Paternal Aunt     No Known Problems Paternal Aunt     No Known Problems Paternal Aunt       Current Medications:     Current Outpatient Medications   Medication Sig Dispense Refill    ALPRAZolam (XANAX) 0 25 mg tablet Take 1 tablet (0 25 mg total) by mouth 3 (three) times a day as needed for anxiety 30 tablet 0    carvedilol (COREG) 25 mg tablet Take 1 tablet (25 mg total) by mouth 2 (two) times a day 180 tablet 4    lisinopril (ZESTRIL) 5 mg tablet TAKE 1 TABLET BY MOUTH TWICE DAILY 180 tablet 3    omeprazole (PriLOSEC) 20 mg delayed release capsule Take 1 capsule (20 mg total) by mouth daily before breakfast (Patient not taking: Reported on 1/25/2022 ) 30 capsule 5     No current facility-administered medications for this visit  Allergies:     No Known Allergies   Physical Exam:     /80 (BP Location: Left arm, Patient Position: Sitting, Cuff Size: Standard)   Pulse 83   Temp 97 6 °F (36 4 °C) (Tympanic)   Resp 16   Ht 5' 1" (1 549 m)   Wt 82 7 kg (182 lb 6 4 oz)   SpO2 98%   BMI 34 46 kg/m²     Physical Exam  Vitals reviewed  Constitutional:       General: She is awake  She is not in acute distress  Appearance: Normal appearance  She is well-developed and well-groomed  She is not ill-appearing  HENT:      Head: Normocephalic  Right Ear: Hearing, tympanic membrane, ear canal and external ear normal       Left Ear: Hearing, tympanic membrane, ear canal and external ear normal       Nose: Nose normal       Mouth/Throat:      Lips: Pink  Mouth: Mucous membranes are moist       Pharynx: Uvula midline  Eyes:      General: Lids are normal       Conjunctiva/sclera: Conjunctivae normal       Pupils: Pupils are equal, round, and reactive to light  Neck:      Thyroid: Thyromegaly present  No thyroid mass or thyroid tenderness  Vascular: Normal carotid pulses  No carotid bruit or JVD  Cardiovascular:      Rate and Rhythm: Normal rate and regular rhythm  Pulses: Normal pulses        Heart sounds: Normal heart sounds, S1 normal and S2 normal  No murmur heard  Pulmonary:      Effort: Pulmonary effort is normal       Breath sounds: Normal breath sounds  Abdominal:      General: Abdomen is flat  Bowel sounds are normal       Palpations: Abdomen is soft  Tenderness: There is no abdominal tenderness  Musculoskeletal:         General: Normal range of motion  Cervical back: Full passive range of motion without pain  Right lower leg: No edema  Left lower leg: No edema  Lymphadenopathy:      Head:      Right side of head: No submental, submandibular, tonsillar, preauricular, posterior auricular or occipital adenopathy  Left side of head: No submental, submandibular, tonsillar, preauricular, posterior auricular or occipital adenopathy  Cervical: No cervical adenopathy  Skin:     General: Skin is warm and dry  Neurological:      Mental Status: She is alert and oriented to person, place, and time  Sensory: No sensory deficit  Motor: Motor function is intact  Psychiatric:         Attention and Perception: Attention normal          Mood and Affect: Mood normal          Speech: Speech normal          Behavior: Behavior normal  Behavior is cooperative  Thought Content:  Thought content normal          Cognition and Memory: Cognition normal          Judgment: Judgment normal           Concepcion Waggoner, [address] [place] [address],[facility] [address]

## 2022-03-03 NOTE — ASSESSMENT & PLAN NOTE
RRR on exam today, no palpitations or sob  Ongoing f u with cardiology  Continue carveddilol, lisinopril

## 2022-03-03 NOTE — ASSESSMENT & PLAN NOTE
Stable on alprazolam prn  She states she only takes 1/2 tablet when sx are severe  No hx of overuse or misuse  Continue tx and monitoring

## 2022-03-03 NOTE — ASSESSMENT & PLAN NOTE
Stable cardiac function, reviewed note from most recent cards f u  Continue with carvedilol, lisinopril    BP normal

## 2022-03-03 NOTE — PATIENT INSTRUCTIONS

## 2022-03-14 ENCOUNTER — APPOINTMENT (OUTPATIENT)
Dept: LAB | Facility: CLINIC | Age: 42
End: 2022-03-14
Payer: COMMERCIAL

## 2022-03-14 DIAGNOSIS — Z11.59 NEED FOR HEPATITIS C SCREENING TEST: ICD-10-CM

## 2022-03-14 DIAGNOSIS — E04.9 ENLARGED THYROID GLAND: ICD-10-CM

## 2022-03-14 DIAGNOSIS — Z00.00 ANNUAL PHYSICAL EXAM: ICD-10-CM

## 2022-03-14 DIAGNOSIS — E78.2 MIXED HYPERLIPIDEMIA: ICD-10-CM

## 2022-03-14 LAB
ANION GAP SERPL CALCULATED.3IONS-SCNC: 6 MMOL/L (ref 4–13)
BACTERIA UR QL AUTO: ABNORMAL /HPF
BASOPHILS # BLD AUTO: 0.06 THOUSANDS/ΜL (ref 0–0.1)
BASOPHILS NFR BLD AUTO: 1 % (ref 0–1)
BILIRUB UR QL STRIP: NEGATIVE
BUN SERPL-MCNC: 16 MG/DL (ref 5–25)
CALCIUM SERPL-MCNC: 9.3 MG/DL (ref 8.3–10.1)
CHLORIDE SERPL-SCNC: 108 MMOL/L (ref 100–108)
CHOLEST SERPL-MCNC: 220 MG/DL
CLARITY UR: CLEAR
CO2 SERPL-SCNC: 25 MMOL/L (ref 21–32)
COLOR UR: ABNORMAL
CREAT SERPL-MCNC: 0.6 MG/DL (ref 0.6–1.3)
EOSINOPHIL # BLD AUTO: 0.33 THOUSAND/ΜL (ref 0–0.61)
EOSINOPHIL NFR BLD AUTO: 4 % (ref 0–6)
ERYTHROCYTE [DISTWIDTH] IN BLOOD BY AUTOMATED COUNT: 12.9 % (ref 11.6–15.1)
GFR SERPL CREATININE-BSD FRML MDRD: 111 ML/MIN/1.73SQ M
GLUCOSE P FAST SERPL-MCNC: 105 MG/DL (ref 65–99)
GLUCOSE UR STRIP-MCNC: NEGATIVE MG/DL
HCT VFR BLD AUTO: 37.3 % (ref 34.8–46.1)
HCV AB SER QL: NORMAL
HDLC SERPL-MCNC: 42 MG/DL
HGB BLD-MCNC: 12 G/DL (ref 11.5–15.4)
HGB UR QL STRIP.AUTO: ABNORMAL
IMM GRANULOCYTES # BLD AUTO: 0.02 THOUSAND/UL (ref 0–0.2)
IMM GRANULOCYTES NFR BLD AUTO: 0 % (ref 0–2)
KETONES UR STRIP-MCNC: NEGATIVE MG/DL
LDLC SERPL CALC-MCNC: 141 MG/DL (ref 0–100)
LEUKOCYTE ESTERASE UR QL STRIP: NEGATIVE
LYMPHOCYTES # BLD AUTO: 2.36 THOUSANDS/ΜL (ref 0.6–4.47)
LYMPHOCYTES NFR BLD AUTO: 29 % (ref 14–44)
MCH RBC QN AUTO: 27.6 PG (ref 26.8–34.3)
MCHC RBC AUTO-ENTMCNC: 32.2 G/DL (ref 31.4–37.4)
MCV RBC AUTO: 86 FL (ref 82–98)
MONOCYTES # BLD AUTO: 0.92 THOUSAND/ΜL (ref 0.17–1.22)
MONOCYTES NFR BLD AUTO: 11 % (ref 4–12)
MUCOUS THREADS UR QL AUTO: ABNORMAL
NEUTROPHILS # BLD AUTO: 4.35 THOUSANDS/ΜL (ref 1.85–7.62)
NEUTS SEG NFR BLD AUTO: 55 % (ref 43–75)
NITRITE UR QL STRIP: NEGATIVE
NON-SQ EPI CELLS URNS QL MICRO: ABNORMAL /HPF
NONHDLC SERPL-MCNC: 178 MG/DL
NRBC BLD AUTO-RTO: 0 /100 WBCS
PH UR STRIP.AUTO: 5.5 [PH]
PLATELET # BLD AUTO: 217 THOUSANDS/UL (ref 149–390)
PMV BLD AUTO: 11.1 FL (ref 8.9–12.7)
POTASSIUM SERPL-SCNC: 4.3 MMOL/L (ref 3.5–5.3)
PROT UR STRIP-MCNC: ABNORMAL MG/DL
RBC # BLD AUTO: 4.34 MILLION/UL (ref 3.81–5.12)
RBC #/AREA URNS AUTO: ABNORMAL /HPF
SODIUM SERPL-SCNC: 139 MMOL/L (ref 136–145)
SP GR UR STRIP.AUTO: 1.03 (ref 1–1.03)
TRIGL SERPL-MCNC: 186 MG/DL
TSH SERPL DL<=0.05 MIU/L-ACNC: 2.93 UIU/ML (ref 0.36–3.74)
UROBILINOGEN UR STRIP-ACNC: <2 MG/DL
WBC # BLD AUTO: 8.04 THOUSAND/UL (ref 4.31–10.16)
WBC #/AREA URNS AUTO: ABNORMAL /HPF

## 2022-03-14 PROCEDURE — 86803 HEPATITIS C AB TEST: CPT

## 2022-03-14 PROCEDURE — 80048 BASIC METABOLIC PNL TOTAL CA: CPT

## 2022-03-14 PROCEDURE — 36415 COLL VENOUS BLD VENIPUNCTURE: CPT

## 2022-03-14 PROCEDURE — 80061 LIPID PANEL: CPT

## 2022-03-14 PROCEDURE — 81001 URINALYSIS AUTO W/SCOPE: CPT | Performed by: NURSE PRACTITIONER

## 2022-03-14 PROCEDURE — 85025 COMPLETE CBC W/AUTO DIFF WBC: CPT

## 2022-03-14 PROCEDURE — 84443 ASSAY THYROID STIM HORMONE: CPT

## 2022-03-15 ENCOUNTER — HOSPITAL ENCOUNTER (OUTPATIENT)
Dept: RADIOLOGY | Age: 42
Discharge: HOME | End: 2022-03-15
Payer: COMMERCIAL

## 2022-03-15 VITALS — BODY MASS INDEX: 34.17 KG/M2 | HEIGHT: 61 IN | WEIGHT: 181 LBS

## 2022-03-15 DIAGNOSIS — Z12.31 ENCOUNTER FOR SCREENING MAMMOGRAM FOR MALIGNANT NEOPLASM OF BREAST: ICD-10-CM

## 2022-03-15 PROCEDURE — 77063 BREAST TOMOSYNTHESIS BI: CPT

## 2022-03-15 PROCEDURE — 77067 SCR MAMMO BI INCL CAD: CPT

## 2022-03-22 ENCOUNTER — REMOTE DEVICE CLINIC VISIT (OUTPATIENT)
Dept: CARDIOLOGY CLINIC | Facility: CLINIC | Age: 42
End: 2022-03-22
Payer: COMMERCIAL

## 2022-03-22 DIAGNOSIS — Z95.810 AICD (AUTOMATIC CARDIOVERTER/DEFIBRILLATOR) PRESENT: Primary | ICD-10-CM

## 2022-03-22 PROCEDURE — G2066 INTER DEVC REMOTE 30D: HCPCS | Performed by: INTERNAL MEDICINE

## 2022-03-22 PROCEDURE — 93297 REM INTERROG DEV EVAL ICPMS: CPT | Performed by: INTERNAL MEDICINE

## 2022-03-22 NOTE — PROGRESS NOTES
Results for orders placed or performed in visit on 03/22/22   Cardiac EP device report    Narrative    MDT/CRT-D (DDD MODE)  CARELINK TRANSMISSION: BATTERY STATUS "2 YRS " AP 0%  98% VSRP 1%  ALL AVAILABLE LEAD PARAMETERS WITHIN NORMAL LIMITS  1 DEVICE CLASSIFIED AT/AF NOTED; 0% BURDEN; 5:34 HRS LONG  NO AC NOTED  PT ON COREG  DR ALEXIS MADE AWARE  [place]; MARKERS ONLY  OPTI-VOL WITHIN NORMAL LIMITS  NORMAL DEVICE FUNCTION   NC         Current Outpatient Medications:     ALPRAZolam (XANAX) 0 25 mg tablet, Take 1 tablet (0 25 mg total) by mouth 3 (three) times a day as needed for anxiety, Disp: 30 tablet, Rfl: 0    carvedilol (COREG) 25 mg tablet, Take 1 tablet (25 mg total) by mouth 2 (two) times a day, Disp: 180 tablet, Rfl: 4    lisinopril (ZESTRIL) 5 mg tablet, TAKE 1 TABLET BY MOUTH TWICE DAILY, Disp: 180 tablet, Rfl: 3    omeprazole (PriLOSEC) 20 mg delayed release capsule, Take 1 capsule (20 mg total) by mouth daily before breakfast (Patient not taking: Reported on 1/25/2022 ), Disp: 30 capsule, Rfl: 5

## 2022-04-01 DIAGNOSIS — I42.0 DILATED CARDIOMYOPATHY (HCC): ICD-10-CM

## 2022-04-01 RX ORDER — LISINOPRIL 5 MG/1
5 TABLET ORAL 2 TIMES DAILY
Qty: 180 TABLET | Refills: 1 | Status: SHIPPED | OUTPATIENT
Start: 2022-04-01

## 2022-06-21 ENCOUNTER — REMOTE DEVICE CLINIC VISIT (OUTPATIENT)
Dept: CARDIOLOGY CLINIC | Facility: CLINIC | Age: 42
End: 2022-06-21
Payer: COMMERCIAL

## 2022-06-21 DIAGNOSIS — Z95.810 AICD (AUTOMATIC CARDIOVERTER/DEFIBRILLATOR) PRESENT: Primary | ICD-10-CM

## 2022-06-21 PROCEDURE — 93295 DEV INTERROG REMOTE 1/2/MLT: CPT | Performed by: INTERNAL MEDICINE

## 2022-06-21 PROCEDURE — 93296 REM INTERROG EVL PM/IDS: CPT | Performed by: INTERNAL MEDICINE

## 2022-06-21 NOTE — PROGRESS NOTES
Results for orders placed or performed in visit on 06/21/22   Cardiac EP device report    Narrative    MDT/CRT-D (DDD MODE)  CARELINK TRANSMISSION: BATTERY STATUS "1 6 YRS " AP 0%  98% VSRP 1 3%  ALL AVAILABLE LEAD PARAMETERS WITHIN NORMAL LIMITS  NO SIGNIFICANT HIGH RATE EPISODES  [address]; MARKERS ONLY  OPTI-VOL WITHIN NORMAL LIMITS  NORMAL DEVICE FUNCTION   NC

## 2022-08-15 ENCOUNTER — ANNUAL EXAM (OUTPATIENT)
Dept: OBGYN CLINIC | Facility: CLINIC | Age: 42
End: 2022-08-15
Payer: COMMERCIAL

## 2022-08-15 VITALS
BODY MASS INDEX: 35.3 KG/M2 | WEIGHT: 187 LBS | HEIGHT: 61 IN | DIASTOLIC BLOOD PRESSURE: 70 MMHG | SYSTOLIC BLOOD PRESSURE: 116 MMHG

## 2022-08-15 DIAGNOSIS — Z01.419 ENCOUNTER FOR GYNECOLOGICAL EXAMINATION: Primary | ICD-10-CM

## 2022-08-15 PROCEDURE — G0145 SCR C/V CYTO,THINLAYER,RESCR: HCPCS | Performed by: OBSTETRICS & GYNECOLOGY

## 2022-08-15 PROCEDURE — G0476 HPV COMBO ASSAY CA SCREEN: HCPCS | Performed by: OBSTETRICS & GYNECOLOGY

## 2022-08-15 PROCEDURE — S0612 ANNUAL GYNECOLOGICAL EXAMINA: HCPCS | Performed by: OBSTETRICS & GYNECOLOGY

## 2022-08-15 NOTE — PROGRESS NOTES
Francesca Marquez  1980      CC:  Yearly exam    S:  38 y o  female here for yearly exam  Her cycles are regular every 28d, lasting 5-6d, heavy with some cramping  LMP 8/2/22  Denies intermenstrual bleeding  Hx postpartum cardiomyopathy, has ICD  She denies vaginal discharge, itching, pelvic pain  She has no urinary concerns, does not have incontinence  No bowel concerns  No breast concerns  Sexual activity: She is sexually active without pain, bleeding or dryness   and monogamous  Kids are good  Just got back from [facility]      Contraception: She uses tubal ligation for contraception  Last Pap: 2/1/18 - Normal, Neg HPV  Last Mammo: 3/15/22 - BIRad 2, scheduled for 2023 (3/17/23)    We reviewed ASCCP guidelines for Pap testing today       Family hx of breast cancer: no  Family hx of ovarian cancer: no  Family hx of colon cancer: no      Current Outpatient Medications:     ALPRAZolam (XANAX) 0 25 mg tablet, Take 1 tablet (0 25 mg total) by mouth 3 (three) times a day as needed for anxiety, Disp: 30 tablet, Rfl: 0    carvedilol (COREG) 25 mg tablet, TAKE 1 TABLET BY MOUTH TWICE A DAY, Disp: 60 tablet, Rfl: 4    lisinopril (ZESTRIL) 5 mg tablet, Take 1 tablet (5 mg total) by mouth 2 (two) times a day, Disp: 180 tablet, Rfl: 1    omeprazole (PriLOSEC) 20 mg delayed release capsule, Take 1 capsule (20 mg total) by mouth daily before breakfast (Patient not taking: No sig reported), Disp: 30 capsule, Rfl: 5     Patient Active Problem List   Diagnosis    Anxiety    Cardiomyopathy ([facility] [address] )    Left bundle-branch block    Menorrhagia with regular cycle    Mitral regurgitation    Chest pain on breathing    Screening for heart disease    Annual physical exam    Lipodystrophy    Class 1 obesity due to excess calories with serious comorbidity and body mass index (BMI) of 34 0 to 34 9 in adult    Exposure to COVID-19 virus    Paroxysmal atrial fibrillation (HCC)    Night sweats    Generalized abdominal pain     Past Medical History:   Diagnosis Date    Allergic rhinitis     Anxiety     At[facility] fibrillation ([facility])     paroxysmal    Bunion 1985    Candidal vulvovaginitis     Cardiomyopathy ([facility] [address] )     Chest pain     CHF (congestive heart failure) ([facility])     chronic systolic    Coronary artery disease     Herpes simplex     Irregular heart beat     LBBB (left bundle branch block)     Normal delivery     2005 son, 2008 daughter    Pacemaker      Family History   Problem Relation Age of Onset    Diabetes Father     Hypertension Father     Heart disease Father     Hyperlipidemia Father     Hypertension Mother     No Known Problems Brother     No Known Problems Brother     No Known Problems Son     No Known Problems Daughter     No Known Problems Maternal Grandmother     No Known Problems Maternal Grandfather     No Known Problems Paternal Grandmother     No Known Problems Paternal Grandfather     No Known Problems Maternal Aunt     No Known Problems Paternal Aunt     No Known Problems Paternal Aunt     No Known Problems Paternal Aunt           Review of Systems   Respiratory: Negative  Cardiovascular: Negative  Gastrointestinal: Negative for constipation and diarrhea  O:  Blood pressure 116/70, height 5' 1 25" (1 556 m), weight 84 8 kg (187 lb), last menstrual period 08/02/2022, not currently breastfeeding  Patient appears well and is not in distress  Breasts are symmetrical without mass, tenderness, nipple discharge, skin changes or adenopathy  Abdomen is soft and nontender without masses  External genitals are normal without lesions or rashes  Urethral meatus and urethra are normal  Bladder is normal to palpation  Vagina is normal without discharge or bleeding  Cervix is normal without discharge or lesion  Uterus is normal, mobile, nontender without palpable mass  Adnexa are normal, nontender, without palpable mass       A:  Yearly exam      P: Pap & HPV today   Mammo scheduled   Colon Cancer Screening age 37   RTO one year for yearly exam or sooner as needed

## 2022-08-18 LAB
HPV HR 12 DNA CVX QL NAA+PROBE: NEGATIVE
HPV16 DNA CVX QL NAA+PROBE: NEGATIVE
HPV18 DNA CVX QL NAA+PROBE: NEGATIVE

## 2022-08-19 LAB
LAB AP GYN PRIMARY INTERPRETATION: NORMAL
Lab: NORMAL

## 2022-09-01 ENCOUNTER — TELEPHONE (OUTPATIENT)
Dept: OTHER | Facility: OTHER | Age: 42
End: 2022-09-01

## 2022-09-01 NOTE — TELEPHONE ENCOUNTER
Patient is having surgery on 09/29 and needs a pre op clearance appointment can be scheduled any day or time as soon as posssible

## 2022-09-02 ENCOUNTER — APPOINTMENT (OUTPATIENT)
Dept: LAB | Age: 42
End: 2022-09-02
Payer: COMMERCIAL

## 2022-09-02 ENCOUNTER — APPOINTMENT (OUTPATIENT)
Dept: RADIOLOGY | Age: 42
End: 2022-09-02
Payer: COMMERCIAL

## 2022-09-02 ENCOUNTER — RA CDI HCC (OUTPATIENT)
Dept: OTHER | Facility: HOSPITAL | Age: 42
End: 2022-09-02

## 2022-09-02 DIAGNOSIS — Z01.818 PRE-OPERATIVE EXAM: ICD-10-CM

## 2022-09-02 LAB
ALBUMIN SERPL BCP-MCNC: 3.3 G/DL (ref 3.5–5)
ALP SERPL-CCNC: 61 U/L (ref 46–116)
ALT SERPL W P-5'-P-CCNC: 49 U/L (ref 12–78)
ANION GAP SERPL CALCULATED.3IONS-SCNC: 6 MMOL/L (ref 4–13)
AST SERPL W P-5'-P-CCNC: 32 U/L (ref 5–45)
ATRIAL RATE: 72 BPM
BASOPHILS # BLD AUTO: 0.04 THOUSANDS/ΜL (ref 0–0.1)
BASOPHILS NFR BLD AUTO: 1 % (ref 0–1)
BILIRUB SERPL-MCNC: 0.25 MG/DL (ref 0.2–1)
BUN SERPL-MCNC: 15 MG/DL (ref 5–25)
CALCIUM ALBUM COR SERPL-MCNC: 9.4 MG/DL (ref 8.3–10.1)
CALCIUM SERPL-MCNC: 8.8 MG/DL (ref 8.3–10.1)
CHLORIDE SERPL-SCNC: 107 MMOL/L (ref 96–108)
CO2 SERPL-SCNC: 22 MMOL/L (ref 21–32)
CREAT SERPL-MCNC: 0.61 MG/DL (ref 0.6–1.3)
EOSINOPHIL # BLD AUTO: 0.32 THOUSAND/ΜL (ref 0–0.61)
EOSINOPHIL NFR BLD AUTO: 4 % (ref 0–6)
ERYTHROCYTE [DISTWIDTH] IN BLOOD BY AUTOMATED COUNT: 13 % (ref 11.6–15.1)
GFR SERPL CREATININE-BSD FRML MDRD: 110 ML/MIN/1.73SQ M
GLUCOSE SERPL-MCNC: 111 MG/DL (ref 65–140)
HCT VFR BLD AUTO: 36.1 % (ref 34.8–46.1)
HGB BLD-MCNC: 11.4 G/DL (ref 11.5–15.4)
IMM GRANULOCYTES # BLD AUTO: 0.03 THOUSAND/UL (ref 0–0.2)
IMM GRANULOCYTES NFR BLD AUTO: 0 % (ref 0–2)
LYMPHOCYTES # BLD AUTO: 2.22 THOUSANDS/ΜL (ref 0.6–4.47)
LYMPHOCYTES NFR BLD AUTO: 30 % (ref 14–44)
MCH RBC QN AUTO: 27.7 PG (ref 26.8–34.3)
MCHC RBC AUTO-ENTMCNC: 31.6 G/DL (ref 31.4–37.4)
MCV RBC AUTO: 88 FL (ref 82–98)
MONOCYTES # BLD AUTO: 0.91 THOUSAND/ΜL (ref 0.17–1.22)
MONOCYTES NFR BLD AUTO: 12 % (ref 4–12)
NEUTROPHILS # BLD AUTO: 3.79 THOUSANDS/ΜL (ref 1.85–7.62)
NEUTS SEG NFR BLD AUTO: 53 % (ref 43–75)
NRBC BLD AUTO-RTO: 0 /100 WBCS
P AXIS: 36 DEGREES
PLATELET # BLD AUTO: 205 THOUSANDS/UL (ref 149–390)
PMV BLD AUTO: 11 FL (ref 8.9–12.7)
POTASSIUM SERPL-SCNC: 4.1 MMOL/L (ref 3.5–5.3)
PR INTERVAL: 132 MS
PROT SERPL-MCNC: 7 G/DL (ref 6.4–8.4)
QRS AXIS: -32 DEGREES
QRSD INTERVAL: 132 MS
QT INTERVAL: 434 MS
QTC INTERVAL: 475 MS
RBC # BLD AUTO: 4.12 MILLION/UL (ref 3.81–5.12)
SODIUM SERPL-SCNC: 135 MMOL/L (ref 135–147)
T WAVE AXIS: 53 DEGREES
VENTRICULAR RATE: 72 BPM
WBC # BLD AUTO: 7.31 THOUSAND/UL (ref 4.31–10.16)

## 2022-09-02 PROCEDURE — 36415 COLL VENOUS BLD VENIPUNCTURE: CPT

## 2022-09-02 PROCEDURE — 71046 X-RAY EXAM CHEST 2 VIEWS: CPT

## 2022-09-02 PROCEDURE — 80053 COMPREHEN METABOLIC PANEL: CPT

## 2022-09-02 PROCEDURE — 93005 ELECTROCARDIOGRAM TRACING: CPT

## 2022-09-02 PROCEDURE — 85025 COMPLETE CBC W/AUTO DIFF WBC: CPT

## 2022-09-02 NOTE — PROGRESS NOTES
[facility]  coding opportunities       Chart reviewed, no opportunity found: CHART REVIEWED, NO OPPORTUNITY FOUND        Patients Insurance        Commercial Insurance: [address] [place] [address]

## 2022-09-09 ENCOUNTER — OFFICE VISIT (OUTPATIENT)
Dept: FAMILY MEDICINE CLINIC | Facility: CLINIC | Age: 42
End: 2022-09-09
Payer: COMMERCIAL

## 2022-09-09 VITALS
BODY MASS INDEX: 34.85 KG/M2 | RESPIRATION RATE: 16 BRPM | HEART RATE: 86 BPM | WEIGHT: 189.4 LBS | OXYGEN SATURATION: 98 % | DIASTOLIC BLOOD PRESSURE: 78 MMHG | SYSTOLIC BLOOD PRESSURE: 110 MMHG | HEIGHT: 62 IN | TEMPERATURE: 98.6 F

## 2022-09-09 DIAGNOSIS — I42.0 DILATED CARDIOMYOPATHY (HCC): ICD-10-CM

## 2022-09-09 DIAGNOSIS — Z01.818 PRE-OP EXAMINATION: Primary | ICD-10-CM

## 2022-09-09 DIAGNOSIS — I48.0 PAROXYSMAL ATRIAL FIBRILLATION (HCC): ICD-10-CM

## 2022-09-09 PROBLEM — Z20.822 EXPOSURE TO COVID-19 VIRUS: Status: RESOLVED | Noted: 2020-11-13 | Resolved: 2022-09-09

## 2022-09-09 PROCEDURE — 99214 OFFICE O/P EST MOD 30 MIN: CPT | Performed by: NURSE PRACTITIONER

## 2022-09-09 PROCEDURE — 3725F SCREEN DEPRESSION PERFORMED: CPT | Performed by: NURSE PRACTITIONER

## 2022-09-09 RX ORDER — OXYCODONE HYDROCHLORIDE AND ACETAMINOPHEN 5; 325 MG/1; MG/1
1 TABLET ORAL EVERY 6 HOURS
COMMUNITY
Start: 2022-08-31

## 2022-09-09 NOTE — ASSESSMENT & PLAN NOTE
Treatment of chronic conditions is optimized on current therapy  Pt is cleared for proposed procedure

## 2022-09-09 NOTE — H&P (VIEW-ONLY)
Assessment/Plan:    Pre-op examination  Treatment of chronic conditions is optimized on current therapy  Pt is cleared for proposed procedure  Paroxysmal atrial fibrillation ([facility] [address] )  Pacemaker in place  RRR on exam, no cp, palpitations, sob  Continue carvedilol, lisinopril  Ongoing f/u with cardiology  Cardiomyopathy [facility])  Ongoing cardiology f/u, continue carvedilol, lisinopril  BP well controlled  Diagnoses and all orders for this visit:    Pre-op examination    Paroxysmal atrial fibrillation ([facility] [address] )    Dilated cardiomyopathy ([facility] [address] )    Other orders  -     oxyCODONE-acetaminophen (PERCOCET) 5-325 mg per tablet; Take 1 tablet by mouth every 6 (six) hours For use  after surgery        Subjective:      Patient ID: Zainab Jorgensen is a 38 y o  female  Pt is a 38 y o  female who is seen today for pre-op clearance  Pt is scheduled for bunionectomy on 9/29  We reviewed PMH, PSH, social history, medications, and allergies  Pre-op testing was ordered  Pt denies chest pain, palpitations, shortness of breath, headache, dizziness, numbness, tingling  Appetite is normal, no N/V/D  No recent fever/chills  The following portions of the patient's history were reviewed and updated as appropriate: allergies, current medications, past family history, past medical history, past social history, past surgical history and problem list     Review of Systems   Constitutional: Negative for activity change, appetite change, chills, fatigue, fever and unexpected weight change  HENT: Negative for hearing loss  Eyes: Negative for visual disturbance  Respiratory: Negative for cough, chest tightness and shortness of breath  Cardiovascular: Negative for chest pain, palpitations and leg swelling  Gastrointestinal: Negative for constipation, diarrhea, nausea and vomiting  Genitourinary: Negative for difficulty urinating, dysuria and frequency  Musculoskeletal: Negative for arthralgias and myalgias  Skin: Negative  Neurological: Negative for dizziness, weakness, numbness and headaches  Psychiatric/Behavioral: Negative for sleep disturbance  Objective:      /78 (BP Location: Left arm, Patient Position: Sitting, Cuff Size: Large)   Pulse 86   Temp 98 6 °F (37 °C) (Temporal)   Resp 16   Ht 5' 2" (1 575 m)   Wt 85 9 kg (189 lb 6 4 oz)   SpO2 98%   BMI 34 64 kg/m²          Physical Exam  Vitals reviewed  Constitutional:       General: She is awake  She is not in acute distress  Appearance: Normal appearance  She is well-developed and well-groomed  She is not ill-appearing  HENT:      Head: Normocephalic  Mouth/Throat:      Mouth: Mucous membranes are moist       Pharynx: Oropharynx is clear  Uvula midline  No pharyngeal swelling  Eyes:      General: Lids are normal       Conjunctiva/sclera: Conjunctivae normal    Cardiovascular:      Rate and Rhythm: Normal rate and regular rhythm  Pulses: Normal pulses  Heart sounds: Normal heart sounds  No murmur heard  Pulmonary:      Effort: Pulmonary effort is normal       Breath sounds: Normal breath sounds  Musculoskeletal:         General: Normal range of motion  Right lower leg: No edema  Left lower leg: No edema  Skin:     General: Skin is warm and dry  Neurological:      Mental Status: She is alert and oriented to person, place, and time  Psychiatric:         Attention and Perception: Attention normal          Mood and Affect: Mood normal          Speech: Speech normal          Behavior: Behavior normal  Behavior is cooperative  Thought Content:  Thought content normal          Cognition and Memory: Cognition normal          Judgment: Judgment normal

## 2022-09-09 NOTE — PROGRESS NOTES
Assessment/Plan:    Pre-op examination  Treatment of chronic conditions is optimized on current therapy  Pt is cleared for proposed procedure  Paroxysmal atrial fibrillation ([facility] [address] )  Pacemaker in place  RRR on exam, no cp, palpitations, sob  Continue carvedilol, lisinopril  Ongoing f/u with cardiology  Cardiomyopathy [facility])  Ongoing cardiology f/u, continue carvedilol, lisinopril  BP well controlled  Diagnoses and all orders for this visit:    Pre-op examination    Paroxysmal atrial fibrillation ([facility] [address] )    Dilated cardiomyopathy ([facility] [address] )    Other orders  -     oxyCODONE-acetaminophen (PERCOCET) 5-325 mg per tablet; Take 1 tablet by mouth every 6 (six) hours For use  after surgery        Subjective:      Patient ID: Leonila Hess is a 38 y o  female  Pt is a 38 y o  female who is seen today for pre-op clearance  Pt is scheduled for bunionectomy on 9/29  We reviewed PMH, PSH, social history, medications, and allergies  Pre-op testing was ordered  Pt denies chest pain, palpitations, shortness of breath, headache, dizziness, numbness, tingling  Appetite is normal, no N/V/D  No recent fever/chills  The following portions of the patient's history were reviewed and updated as appropriate: allergies, current medications, past family history, past medical history, past social history, past surgical history and problem list     Review of Systems   Constitutional: Negative for activity change, appetite change, chills, fatigue, fever and unexpected weight change  HENT: Negative for hearing loss  Eyes: Negative for visual disturbance  Respiratory: Negative for cough, chest tightness and shortness of breath  Cardiovascular: Negative for chest pain, palpitations and leg swelling  Gastrointestinal: Negative for constipation, diarrhea, nausea and vomiting  Genitourinary: Negative for difficulty urinating, dysuria and frequency  Musculoskeletal: Negative for arthralgias and myalgias  Skin: Negative  Neurological: Negative for dizziness, weakness, numbness and headaches  Psychiatric/Behavioral: Negative for sleep disturbance  Objective:      /78 (BP Location: Left arm, Patient Position: Sitting, Cuff Size: Large)   Pulse 86   Temp 98 6 °F (37 °C) (Temporal)   Resp 16   Ht 5' 2" (1 575 m)   Wt 85 9 kg (189 lb 6 4 oz)   SpO2 98%   BMI 34 64 kg/m²          Physical Exam  Vitals reviewed  Constitutional:       General: She is awake  She is not in acute distress  Appearance: Normal appearance  She is well-developed and well-groomed  She is not ill-appearing  HENT:      Head: Normocephalic  Mouth/Throat:      Mouth: Mucous membranes are moist       Pharynx: Oropharynx is clear  Uvula midline  No pharyngeal swelling  Eyes:      General: Lids are normal       Conjunctiva/sclera: Conjunctivae normal    Cardiovascular:      Rate and Rhythm: Normal rate and regular rhythm  Pulses: Normal pulses  Heart sounds: Normal heart sounds  No murmur heard  Pulmonary:      Effort: Pulmonary effort is normal       Breath sounds: Normal breath sounds  Musculoskeletal:         General: Normal range of motion  Right lower leg: No edema  Left lower leg: No edema  Skin:     General: Skin is warm and dry  Neurological:      Mental Status: She is alert and oriented to person, place, and time  Psychiatric:         Attention and Perception: Attention normal          Mood and Affect: Mood normal          Speech: Speech normal          Behavior: Behavior normal  Behavior is cooperative  Thought Content:  Thought content normal          Cognition and Memory: Cognition normal          Judgment: Judgment normal

## 2022-09-09 NOTE — ASSESSMENT & PLAN NOTE
Pacemaker in place  RRR on exam, no cp, palpitations, sob  Continue carvedilol, lisinopril  Ongoing f/u with cardiology

## 2022-09-20 ENCOUNTER — REMOTE DEVICE CLINIC VISIT (OUTPATIENT)
Dept: CARDIOLOGY CLINIC | Facility: CLINIC | Age: 42
End: 2022-09-20
Payer: COMMERCIAL

## 2022-09-20 DIAGNOSIS — Z95.810 AICD (AUTOMATIC CARDIOVERTER/DEFIBRILLATOR) PRESENT: Primary | ICD-10-CM

## 2022-09-20 PROCEDURE — 93296 REM INTERROG EVL PM/IDS: CPT | Performed by: INTERNAL MEDICINE

## 2022-09-20 PROCEDURE — 93295 DEV INTERROG REMOTE 1/2/MLT: CPT | Performed by: INTERNAL MEDICINE

## 2022-09-20 NOTE — PROGRESS NOTES
Results for orders placed or performed in visit on 09/20/22   Cardiac EP device report    Narrative    MDT/CRT-D (DDD MODE)  CARELINK TRANSMISSION: BATTERY VOLTAGE ADEQUATE (15 MOS)  AP<0 1%, BVP>99% (TOTAL -98 5%+VSR PACE-1 2%)  ALL AVAILABLE LEAD PARAMETERS WITHIN NORMAL LIMITS  NO SIGNIFICANT HIGH RATE EPISODES  [address] [place] [address] Dr  OPTI-VOL WITHIN NORMAL LIMITS  NORMAL DEVICE FUNCTION   GV

## 2022-09-21 NOTE — PRE-PROCEDURE INSTRUCTIONS
Pre-Surgery Instructions:   Medication Instructions    ALPRAZolam (XANAX) 0 25 mg tablet Uses PRN- OK to take day of surgery    carvedilol (COREG) 25 mg tablet Take day of surgery   lisinopril (ZESTRIL) 5 mg tablet Hold day of surgery   oxyCODONE-acetaminophen (PERCOCET) 5-325 mg per tablet Uses PRN- OK to take day of surgery    INSTR ON BRIGID CALL,  REPORT LOC , BRING PHOTO ID/MED LIST/INS  INFO ,SHOWER REV , STOP ASA/NSAID/VIT 7 DAY PREOP, PT VERBALIZES UNDERSTANDING W/ NO FURTHER QUESTIONS

## 2022-09-28 ENCOUNTER — ANESTHESIA EVENT (OUTPATIENT)
Dept: PERIOP | Facility: HOSPITAL | Age: 42
End: 2022-09-28
Payer: COMMERCIAL

## 2022-09-29 ENCOUNTER — APPOINTMENT (OUTPATIENT)
Dept: RADIOLOGY | Facility: HOSPITAL | Age: 42
End: 2022-09-29
Payer: COMMERCIAL

## 2022-09-29 ENCOUNTER — ANESTHESIA (OUTPATIENT)
Dept: PERIOP | Facility: HOSPITAL | Age: 42
End: 2022-09-29
Payer: COMMERCIAL

## 2022-09-29 ENCOUNTER — HOSPITAL ENCOUNTER (OUTPATIENT)
Facility: HOSPITAL | Age: 42
Setting detail: OUTPATIENT SURGERY
Discharge: HOME | End: 2022-09-29
Attending: PODIATRIST | Admitting: PODIATRIST
Payer: COMMERCIAL

## 2022-09-29 ENCOUNTER — HOSPITAL ENCOUNTER (OUTPATIENT)
Dept: RADIOLOGY | Facility: HOSPITAL | Age: 42
Discharge: HOME | End: 2022-09-29
Payer: COMMERCIAL

## 2022-09-29 VITALS
TEMPERATURE: 98.1 F | BODY MASS INDEX: 35.46 KG/M2 | SYSTOLIC BLOOD PRESSURE: 119 MMHG | RESPIRATION RATE: 20 BRPM | HEART RATE: 77 BPM | OXYGEN SATURATION: 98 % | HEIGHT: 62 IN | WEIGHT: 192.68 LBS | DIASTOLIC BLOOD PRESSURE: 69 MMHG

## 2022-09-29 DIAGNOSIS — Z98.890 POST-OPERATIVE STATE: ICD-10-CM

## 2022-09-29 DIAGNOSIS — M20.41 OTHER HAMMER TOE(S) (ACQUIRED), RIGHT FOOT: ICD-10-CM

## 2022-09-29 DIAGNOSIS — G89.18 ACUTE POST-OPERATIVE PAIN: Primary | ICD-10-CM

## 2022-09-29 PROBLEM — E66.09 CLASS 1 OBESITY DUE TO EXCESS CALORIES WITH SERIOUS COMORBIDITY AND BODY MASS INDEX (BMI) OF 34.0 TO 34.9 IN ADULT: Status: RESOLVED | Noted: 2019-08-26 | Resolved: 2022-09-29

## 2022-09-29 PROBLEM — E66.811 CLASS 1 OBESITY DUE TO EXCESS CALORIES WITH SERIOUS COMORBIDITY AND BODY MASS INDEX (BMI) OF 34.0 TO 34.9 IN ADULT: Status: RESOLVED | Noted: 2019-08-26 | Resolved: 2022-09-29

## 2022-09-29 PROBLEM — E66.9 OBESITY (BMI 35.0-39.9 WITHOUT COMORBIDITY): Status: ACTIVE | Noted: 2022-09-29

## 2022-09-29 LAB
EXT PREGNANCY TEST URINE: NEGATIVE
EXT. CONTROL: NORMAL

## 2022-09-29 PROCEDURE — C1713 ANCHOR/SCREW BN/BN,TIS/BN: HCPCS | Performed by: PODIATRIST

## 2022-09-29 PROCEDURE — C1769 GUIDE WIRE: HCPCS | Performed by: PODIATRIST

## 2022-09-29 PROCEDURE — 73630 X-RAY EXAM OF FOOT: CPT

## 2022-09-29 PROCEDURE — 81025 URINE PREGNANCY TEST: CPT | Performed by: STUDENT IN AN ORGANIZED HEALTH CARE EDUCATION/TRAINING PROGRAM

## 2022-09-29 PROCEDURE — C1781 MESH (IMPLANTABLE): HCPCS | Performed by: PODIATRIST

## 2022-09-29 PROCEDURE — 73620 X-RAY EXAM OF FOOT: CPT

## 2022-09-29 PROCEDURE — C1776 JOINT DEVICE (IMPLANTABLE): HCPCS | Performed by: PODIATRIST

## 2022-09-29 DEVICE — HAMMERTOE IMPLANT, MEDIUM
Type: IMPLANTABLE DEVICE | Site: FOOT | Status: FUNCTIONAL
Brand: TOETAC

## 2022-09-29 DEVICE — BIOACTIVE BONE GRAFT SUBSTITUTE, FOAM PACK; BETA-TRICALCIUM PHOSPHATE, TYPE I BOVINE COLLAGEN, AND BIOACTIVE GLASS
Type: IMPLANTABLE DEVICE | Site: FOOT | Status: FUNCTIONAL
Brand: VITOSS BA2X

## 2022-09-29 DEVICE — IMPLANTABLE DEVICE
Type: IMPLANTABLE DEVICE | Site: FOOT | Status: FUNCTIONAL
Brand: ORTHOLOC 3DI

## 2022-09-29 DEVICE — (32 SQ CM) - ALLOGRAFT TISSUE WRAP DS WET 4 X 8 CM: Type: IMPLANTABLE DEVICE | Site: FOOT | Status: FUNCTIONAL

## 2022-09-29 DEVICE — SCREW CANN 4 X 35MM LNG THRD: Type: IMPLANTABLE DEVICE | Site: FOOT | Status: FUNCTIONAL

## 2022-09-29 DEVICE — IMPLANTABLE DEVICE: Type: IMPLANTABLE DEVICE | Site: FOOT | Status: FUNCTIONAL

## 2022-09-29 RX ORDER — SODIUM CHLORIDE 9 MG/ML
125 INJECTION, SOLUTION INTRAVENOUS CONTINUOUS
Status: DISCONTINUED | OUTPATIENT
Start: 2022-09-29 | End: 2022-09-29 | Stop reason: HOSPADM

## 2022-09-29 RX ORDER — OXYCODONE HYDROCHLORIDE AND ACETAMINOPHEN 5; 325 MG/1; MG/1
1 TABLET ORAL ONCE AS NEEDED
Status: COMPLETED | OUTPATIENT
Start: 2022-09-29 | End: 2022-09-29

## 2022-09-29 RX ORDER — BUPIVACAINE HYDROCHLORIDE 5 MG/ML
INJECTION, SOLUTION PERINEURAL AS NEEDED
Status: DISCONTINUED | OUTPATIENT
Start: 2022-09-29 | End: 2022-09-29 | Stop reason: HOSPADM

## 2022-09-29 RX ORDER — ACETAMINOPHEN 325 MG/1
650 TABLET ORAL ONCE AS NEEDED
Status: DISCONTINUED | OUTPATIENT
Start: 2022-09-29 | End: 2022-09-29 | Stop reason: HOSPADM

## 2022-09-29 RX ORDER — FENTANYL CITRATE 50 UG/ML
INJECTION, SOLUTION INTRAMUSCULAR; INTRAVENOUS AS NEEDED
Status: DISCONTINUED | OUTPATIENT
Start: 2022-09-29 | End: 2022-09-29

## 2022-09-29 RX ORDER — KETOROLAC TROMETHAMINE 30 MG/ML
INJECTION, SOLUTION INTRAMUSCULAR; INTRAVENOUS AS NEEDED
Status: DISCONTINUED | OUTPATIENT
Start: 2022-09-29 | End: 2022-09-29

## 2022-09-29 RX ORDER — CEFAZOLIN SODIUM 2 G/50ML
2000 SOLUTION INTRAVENOUS ONCE
Status: COMPLETED | OUTPATIENT
Start: 2022-09-29 | End: 2022-09-29

## 2022-09-29 RX ORDER — OXYCODONE HYDROCHLORIDE AND ACETAMINOPHEN 5; 325 MG/1; MG/1
1 TABLET ORAL EVERY 6 HOURS PRN
Qty: 20 TABLET | Refills: 0 | Status: SHIPPED | OUTPATIENT
Start: 2022-09-29 | End: 2022-10-09

## 2022-09-29 RX ORDER — MAGNESIUM HYDROXIDE 1200 MG/15ML
LIQUID ORAL AS NEEDED
Status: DISCONTINUED | OUTPATIENT
Start: 2022-09-29 | End: 2022-09-29 | Stop reason: HOSPADM

## 2022-09-29 RX ORDER — ONDANSETRON 2 MG/ML
INJECTION INTRAMUSCULAR; INTRAVENOUS AS NEEDED
Status: DISCONTINUED | OUTPATIENT
Start: 2022-09-29 | End: 2022-09-29

## 2022-09-29 RX ORDER — ONDANSETRON 2 MG/ML
4 INJECTION INTRAMUSCULAR; INTRAVENOUS ONCE AS NEEDED
Status: DISCONTINUED | OUTPATIENT
Start: 2022-09-29 | End: 2022-09-29 | Stop reason: HOSPADM

## 2022-09-29 RX ORDER — FENTANYL CITRATE/PF 50 MCG/ML
25 SYRINGE (ML) INJECTION
Status: DISCONTINUED | OUTPATIENT
Start: 2022-09-29 | End: 2022-09-29 | Stop reason: HOSPADM

## 2022-09-29 RX ORDER — HYDROMORPHONE HCL/PF 1 MG/ML
SYRINGE (ML) INJECTION AS NEEDED
Status: DISCONTINUED | OUTPATIENT
Start: 2022-09-29 | End: 2022-09-29

## 2022-09-29 RX ORDER — DEXAMETHASONE SODIUM PHOSPHATE 10 MG/ML
INJECTION, SOLUTION INTRAMUSCULAR; INTRAVENOUS AS NEEDED
Status: DISCONTINUED | OUTPATIENT
Start: 2022-09-29 | End: 2022-09-29

## 2022-09-29 RX ADMIN — OXYCODONE AND ACETAMINOPHEN 1 TABLET: 5; 325 TABLET ORAL at 12:21

## 2022-09-29 RX ADMIN — KETOROLAC TROMETHAMINE 30 MG: 30 INJECTION, SOLUTION INTRAMUSCULAR; INTRAVENOUS at 10:43

## 2022-09-29 RX ADMIN — CEFAZOLIN SODIUM 2000 MG: 2 SOLUTION INTRAVENOUS at 09:01

## 2022-09-29 RX ADMIN — SODIUM CHLORIDE: 0.9 INJECTION, SOLUTION INTRAVENOUS at 09:33

## 2022-09-29 RX ADMIN — FENTANYL CITRATE 25 MCG: 50 INJECTION INTRAMUSCULAR; INTRAVENOUS at 09:30

## 2022-09-29 RX ADMIN — FENTANYL CITRATE 25 MCG: 50 INJECTION, SOLUTION INTRAMUSCULAR; INTRAVENOUS at 11:38

## 2022-09-29 RX ADMIN — SODIUM CHLORIDE: 0.9 INJECTION, SOLUTION INTRAVENOUS at 10:29

## 2022-09-29 RX ADMIN — FENTANYL CITRATE 50 MCG: 50 INJECTION INTRAMUSCULAR; INTRAVENOUS at 09:04

## 2022-09-29 RX ADMIN — FENTANYL CITRATE 25 MCG: 50 INJECTION, SOLUTION INTRAMUSCULAR; INTRAVENOUS at 11:32

## 2022-09-29 RX ADMIN — DEXAMETHASONE SODIUM PHOSPHATE 6 MG: 10 INJECTION, SOLUTION INTRAMUSCULAR; INTRAVENOUS at 10:43

## 2022-09-29 RX ADMIN — ONDANSETRON 4 MG: 2 INJECTION INTRAMUSCULAR; INTRAVENOUS at 10:42

## 2022-09-29 RX ADMIN — HYDROMORPHONE HYDROCHLORIDE 0.5 MG: 1 INJECTION, SOLUTION INTRAMUSCULAR; INTRAVENOUS; SUBCUTANEOUS at 11:03

## 2022-09-29 RX ADMIN — FENTANYL CITRATE 25 MCG: 50 INJECTION INTRAMUSCULAR; INTRAVENOUS at 09:12

## 2022-09-29 RX ADMIN — SODIUM CHLORIDE 125 ML/HR: 0.9 INJECTION, SOLUTION INTRAVENOUS at 07:49

## 2022-09-29 NOTE — DISCHARGE SUMMARY
Discharge Summary Outpatient Procedure Podiatry -   Rianna Noel 38 y o  female MRN: [id number]  Unit/Bed#: OR POOL Encounter: [id number]    Admission Date: 9/29/2022     Admitting Diagnosis: Bunion, right foot [M21 611]  Other hammer toe(s) (acquired), right foot [M20 41]    Discharge Diagnosis: same    Procedures Performed: LAPIDUS BUNIONECTOMY:   2ND HAMMERTOE REPAIR:   Open RECESSION of GASTROC with aid of ENDOSCOPIC:     Complications: none    Condition at Discharge: stable    Discharge instructions/Information to patient and family:   See after visit summary for information provided to patient and family  Provisions for Follow-Up Care/Important appointments:  See after visit summary for information related to follow-up care and any pertinent home health orders  Discharge Medications:  See after visit summary for reconciled discharge medications provided to patient and family

## 2022-09-29 NOTE — INTERVAL H&P NOTE
H&P reviewed  After examining the patient I find no changes in the patients condition since the H&P had been written      Vitals:    09/29/22 1236   BP: 119/69   Pulse: 77   Resp: 20   Temp: 98 1 °F (36 7 °C)   SpO2: 98%

## 2022-09-29 NOTE — OP NOTE
OPERATIVE REPORT - Podiatry  PATIENT NAME: Elie Estrella    :  1980  MRN: [id number]  Pt Location: [facility] OR ROOM 03    SURGERY DATE: 2022    Surgeon(s) and Role: * Keon Moran DPM - Primary     * TITI Shah - Assisting    Pre-op Diagnosis:  Bunion, right foot [M21 611]  Other hammer toe(s) (acquired), right foot [M20 41]    Post-Op Diagnosis Codes:     * Bunion, right foot [M21 611]     * Other hammer toe(s) (acquired), right foot [M20 41]    Procedure(s) (LRB):  LAPIDUS BUNIONECTOMY (Right)  2ND HAMMERTOE REPAIR (Right)  Open RECESSION of GASTROC with assistance of ENDOSCOPIC (right)    Specimen(s):  * No specimens in log *    Estimated Blood Loss:   Minimal    Drains:  Closed/Suction Drain Posterior;Right Back Bulb 19 Fr  (Active)   Number of days: 1219       Closed/Suction Drain Left;Posterior Back Bulb 19 Fr  (Active)   Number of days: 1219       Closed/Suction Drain Right Abdomen Bulb 19 Fr  (Active)   Number of days: 1219       Anesthesia Type:   General with 20 ml of 1% Lidocaine and 0 5% Bupivacaine in a 1:1 mixture    Hemostasis:  Surgical dissection  Pneumatic thigh tourniquet placed for 81 minutes    Materials:  Implant Name Type Inv   Item Serial No   Lot No  LRB No  Used Action   (32 SQ CM) - ALLOGRAFT TISSUE WRAP DS WET 4 X 8 CM - [id number]  (32 SQ CM) - ALLOGRAFT TISSUE WRAP DS WET 4 X 8 CM [id number] [employer] [id number] Right 1 Implanted   VITOSS BA 2X 5ML FOAM PACK - [id number]  VITOSS BA 2X 5ML FOAM PACK  [employer] [id number] Right 1 Implanted   SCREW TIM 4 X 35MM LNG THRD - [id number]  SCREW TIM 4 X 35MM LNG THRD  [employer]  Right 1 Implanted   SCREW LCK 3 5 X 18MM FLLY THRD - [id number]  SCREW LCK 3 5 X 18MM FLLY THRD  [employer]  Right 2 Implanted   Standard Lapidus Plate R      Right 1 Implanted   SCREW LCK 3 5 X 16MM FLLY THRD - [id number]  SCREW LCK 3 5 X 16MM FLLY THRD  [facility]  Right 2 Implanted     Vicryl, nylon    Operative Findings:  Same as pre op     Complications:   None    Procedure and Technique:     Under mild sedation, the patient was brought into the operating room and placed on the operating room table in the supine position  A pneumatic tourniquet was then placed around the patient's right lower extremity with ample webril padding  A time out was performed to confirm the correct patient, procedure and site with all parties in agreement  Following IV sedation, a local block was performed consisting of 20 ml of 1% Lidocaine and 0 5% Bupivacaine in a 1:1 mixture  The foot was then scrubbed, prepped and draped in the usual aseptic manner  An esmarch bandage was utilized to exsangunate the patients foot and the tourniquet was then inflated  The esmarch bandage was removed and the foot was placed on the operating room table  Attention was then directed to the posteromedial aspect of the right lower extremity, proximally 2 fingerbreadths distance distal to the medial head of the gastrocnemius  An approximately 2 cm linear incision was made  Subcutaneous dissection was carried blunt instrumentation down to the level of the deep crural fascia  The sural nerve and lesser saphenous vein were not encountered  Utilizing a scalpel, a linear incision was made through the crural fascia and the aponeurosis of the gastrocnemius muscle was visualized  At this time, an endoscopic gastrocnemius recession was performed according to manufacture guidelines utilizing a single incision approach  Utilizing the fascial elevator, blunt dissection was carried transversely to the medial aspect of the right lower extremity  Thereafter the obturator and cannula assembly were inserted from medial to lateral along the dissection plane that was created  The obturator was removed and a 4 0 mm scope of was introduced into the cannula  The aponeurosis of the gastrocnemius was directly visualized    Thereafter the cutting blade was attached to the end of the scope and was then reintroduced into the cannula  The gastroc aponeurosis was incised transversely in its entirety  Underlying muscle belly was directly visualized and passive range of motion at the ankle was improved  This was irrigated with sterile saline  Subcutaneous closure was obtained utilizing 4-0 Vicryl  Skin margins were reapproximated usine 4-0 nylon  Surgical site was dressed with xeroform followed by 4x4 and tegaderm  Attention was the directed to the dorsal aspect of the Right 1st TMTJ where an approximately 5cm linear incision was made through skin  Blunt dissection was carried through the subcutaneous tissues, with care taken to protect any neurovascular structures  Electrocautery was used as needed for any bleeders  The EHL tendon was identified and retracted laterally  Sharp dissection was then continued to the level of the periosteum, which was reflected off of the surface of the medial cuneiform and base of the 1st metatarsal  An osteotome was introduced into the TMTJ and was freed from any soft tissue structures to gain mobility  A threaded K Wire was then inserted from dorsal to plantar at the body of the medial coneiform  A Lapifuse joint distractor was placed into position  A second k wire was then advanced through the distal jovan of the distractor to secure it in place  The TMTJ was distracted and the cartilagenous surface of the cuneiform and base of 1st metatarsal was removed, and the subchondral bone was then fenestrated with a drill  The distractor was then removed  Correct position and reduction of deformity was confirmed on C-arm  The k wires over the base of the 1st metatarsal base and cuneiform were removed  A guidewire was inserted from medial to lateral including the base of the 1st metatarsal, medial cuneiform, and intermediate cuneiform  A  compression screw (see implants above) was inserted  The guidewire was removed   A  obiwon plate was placed over the 1st TMTJ (see implants above), correct position was verified on C arm  This was secured with a combination of locking screws  The surgical site was irrigated with NSS  The surgical incision was irrigated with copious amounts of normal sterile saline  Attention was then directed to the second toe  A hammertoe deformity was present  A  dorsal linear incision was made from the metatarsal- phalangeal joint to the proximal interphalangeal joint  The incision was then deepened via sharp dissection through the subcutaneous tissues, ligating all venous vessels as necessary  Dissection was carried to the level of the EDL tendon  The tendon was then transected at that level  The PIPJ was then exposed and the medial and lateral collateral ligaments were incised to expose the head of the proximal phalanx  By use of sagittal saw, the head of the proximal phalanx was resected  A sagittal saw was used to remove the articular cartilage off of the base of the middle phalange  Push test showed excellent reduction of hammertoe deformity  At this time, a toe tac implant and K-wire was used to maintain corrected positition of the hammertoe, inserting the the implant in the proximal and middle phalanx per manufacture guidelines  The surgical incision was irrigated with copious amounts of normal sterile saline  The periosteal and capsular structures were reapproximated using 3-0 Vicryl  Subcutaneous closure was obtained utilizing 4-0 Vicryl in an interrupted suture technique  Skin edges were reapproximated and closure was obtained utilizing interrupted retention sutures utilizing 4-0  Nylon  The foot was then cleansed and dried  A postoperative injection consisting of 20 ml of 0 5% Bupivacaine was performed  The incision site was dressed with Xeroform, 4x4 gauze, and ABD  This was then covered with a Kerlix and an ACE wrap  The tourniquet was deflated and normal hyperemic flush was noted to all digits   The patient tolerated the procedure and anesthesia well and was transported to the PACU with vital signs stable  Dr Apurva Mei was present during the entire procedure and participated in all key aspects  SIGNATURE: Miguel Titus  DATE: September 29, 2022  TIME: 11:05 AM      Portions of the record may have been created with voice recognition software  Occasional wrong word or "sound a like" substitutions may have occurred due to the inherent limitations of voice recognition software  Read the chart carefully and recognize, using context, where substitutions have occurred

## 2022-09-29 NOTE — DISCHARGE INSTRUCTIONS
Post-Operative Instructions    Start taking baby aspirin (81 mg) tomorrow 9/30/22  Take it until discontinued per Dr Gianfranco Mancilla    1  Take your prescribed medication as directed  2  Upon arrival at home, lie down and elevate your surgical foot on 2 pillows  3  Remain quiet, off your feet as much as possible, for the first 24-48 hours  This is when your feet first swell and may become painful  After 48 hours you may begin limited walking following these restrictions:   Nonweightbearinbg to surgical foot with assistance of crutches  4  Drink large quantities of water  Consume no alcohol  Continue a well-balanced diet  5  Report any unusual discomfort or fever to this office  6  A limited amount of discomfort and swelling is to be expected  In some cases the skin may take on a bruised appearance  The surgical solution that was applied to your foot prior to the operation is dark in color and the operation site may appear to be oozing when it actually is not  7  A slight amount of blood is to be expected, and is no cause for alarm  Do not remove the dressings  If there is active bleeding and if the bleeding persists, add additional gauze to the bandage, apply direct pressure, elevate your feet and call this office  8  Do not get the dressings wet  As regular bathing may be inconvenient, sponge baths are recommended  9  When anesthesia wears off and if any discomfort should be present, apply an ice pack directly over the operated area for 15 minute intervals for several hours or until the pain leaves  (USE IN EXCESS OF 15 MINUTES COULD CAUSE FROSTBITE)  Do not use hot water bags or electric pads  A convenient icepack can be made by placing ice cubes in a plastic bag and covering this with a towel  10  If necessary, take a mild laxative before retiring  11  Wear your special open shoes anytime you put weight on your foot, even if it is just to walk to the bathroom and back   It will probably be 2 or 3 weeks before you will be permitted to try regular shoes  12  Having performed the operation, we are interested in a prompt recovery  Please cooperate by following the above instructions  13  Please call to confirm your post-op appointment or call with any other questions

## 2022-09-29 NOTE — ANESTHESIA POSTPROCEDURE EVALUATION
Post-Op Assessment Note    CV Status:  Stable  Pain Score: 2    Pain management: adequate     Mental Status:  Alert and awake   Hydration Status:  Euvolemic   PONV Controlled:  Controlled   Airway Patency:  Patent      Post Op Vitals Reviewed: Yes      Staff: Anesthesiologist         No complications documented      BP      Temp     Pulse     Resp      SpO2      /65   Pulse 84   Temp (!) 97 °F (36 1 °C)   Resp 16   Ht 5' 2" (1 575 m)   Wt 87 4 kg (192 lb 10 9 oz)   LMP 09/26/2022 (Exact Date)   SpO2 98%   BMI 35 24 kg/m²

## 2022-09-29 NOTE — ANESTHESIA PREPROCEDURE EVALUATION
Procedure:  LAPIDUS BUNIONECTOMY (Right Foot)  2ND HAMMERTOE REPAIR (Right Foot)  RECESSION GASTROC ENDOSCOPIC (Right Foot)    Relevant Problems   CARDIO   (+) Chest pain on breathing   (+) Left bundle-branch block   (+) Mitral regurgitation   (+) Paroxysmal atrial fibrillation (HCC)      NEURO/PSYCH   (+) Anxiety      Cardiovascular and Mediastinum   (+) Cardiomyopathy (HCC)      Other   (+) Generalized abdominal pain        Physical Exam    Airway    Mallampati score: II  TM Distance: >3 FB  Neck ROM: full     Dental   No notable dental hx     Cardiovascular  Rhythm: regular, Rate: normal, Murmur, Cardiovascular exam normal    Pulmonary  Pulmonary exam normal Breath sounds clear to auscultation,     Other Findings        Anesthesia Plan  ASA Score- 3     Anesthesia Type- general with ASA Monitors  Additional Monitors:   Airway Plan: LMA  Comment: Atrial-sensed ventricular-paced rhythm with occasional Fusion complexes  Abnormal ECG  When compared with ECG of 10-SEP-2018 16:11,  Vent  rate has increased BY   9 BPM  Fusion complexes are now Present  Confirmed by Sofiya Rasmussen ([id number]) on 9/2/2022 10:50:02 AM        Plan Factors-    Chart reviewed  EKG reviewed  Existing labs reviewed  Patient summary reviewed  Patient is not a current smoker  Patient instructed to abstain from smoking on day of procedure  Patient did not smoke on day of surgery  Induction- intravenous  Postoperative Plan-     Informed Consent- Anesthetic plan and risks discussed with patient and spouse (Anthony)

## 2022-09-30 DIAGNOSIS — I42.0 DILATED CARDIOMYOPATHY (HCC): ICD-10-CM

## 2022-09-30 RX ORDER — LISINOPRIL 5 MG/1
TABLET ORAL
Qty: 60 TABLET | Refills: 5 | Status: SHIPPED | OUTPATIENT
Start: 2022-09-30 | End: 2022-10-24

## 2022-10-24 DIAGNOSIS — I42.0 DILATED CARDIOMYOPATHY (HCC): ICD-10-CM

## 2022-10-24 RX ORDER — LISINOPRIL 5 MG/1
TABLET ORAL
Qty: 180 TABLET | Refills: 2 | Status: SHIPPED | OUTPATIENT
Start: 2022-10-24

## 2022-11-23 ENCOUNTER — EVALUATION (OUTPATIENT)
Dept: PHYSICAL THERAPY | Facility: REHABILITATION | Age: 42
End: 2022-11-23

## 2022-11-23 DIAGNOSIS — R60.0 EDEMA OF RIGHT FOOT: Primary | ICD-10-CM

## 2022-11-23 DIAGNOSIS — R20.2 PARESTHESIA OF RIGHT FOOT: ICD-10-CM

## 2022-11-23 NOTE — PROGRESS NOTES
PT Evaluation     Today's date: 2022  Patient name: Maureen Mehta  : 1980  MRN: [id number]  Referring provider: No ref  provider found  Dx:   Encounter Diagnosis     ICD-10-CM    1  Edema of right foot  R60 0       2  Paresthesia of right foot  R20 2                      Assessment  Assessment details: Maureen Mehta is a 38y o  year old female with a referred dx of localized edema of R foot from [place], [place]  Patient signs and symptoms are consistent with post-op localized edema and potential sural nerve impingement  Upon further clinical testing, pt presents with the following deficits: diminished dermatome along R lower L5-S1, limited R 1st MTP ROM, and reduced B/L ankle DF/PF ROM  Pt has good strength B/L and intact ligamentous integrity  Due to good ankle strength, but sensory disturbances have less suspicion of significant fibular nerve involvement  Lateral ankle sensory disturbances and reported intermittent paresthesias suggest potential sural nerve being impacted, potentially due to prolonged immobilization and increase edema  These deficits and impairments result in limited ankle ROM and inability to tolerate prolonged walking > 30 mins  Pt was provided with a basic HEP focused on ankle mobility which will be reviewed in the upcoming session  Pt was educated on anatomy and physiology of diagnosis and demonstrated verbal understanding  Pt would benefit from skilled OP PT to address these, and the below impairments in order to optimize outcomes and promote return to functional baseline  Pt able to demonstrate HEP with good technique and no pain  Educated pt to stop any exercises causing pain increase, pt verbalizes understanding  Impairments: abnormal or restricted ROM, activity intolerance, lacks appropriate home exercise program, weight-bearing intolerance and poor body mechanics    Symptom irritability: lowUnderstanding of Dx/Px/POC: good  Goals    Short Term Goals:   In 2 weeks, the patient will:  1  Pt will have improved ankle DF AROM by atleast 5 degrees for ankle mobility  2  Supervision with HEP for self care  Long Term Goals: In 6 weeks, the patient will:  1  Pt will be able to tolerate walking for > 30 mins without   2  FOTO to greater than predicted value  3  Independent with comprehensive HEP upon discharge  Plan  Patient would benefit from: skilled physical therapy  Referral necessary: No  Planned therapy interventions: activity modification, joint mobilization, manual therapy, massage, muscle pump exercises, neuromuscular re-education, patient education, self care, stretching, therapeutic activities, therapeutic exercise, therapeutic training, home exercise program, graded exercise, graded activity, gait training, functional ROM exercises, flexibility, compression, behavior modification, balance/weight bearing training and body mechanics training  Frequency: 1x week  Duration in weeks: 6  Plan of Care beginning date: 2022  Plan of Care expiration date: 2023  Treatment plan discussed with: patient        Subjective Evaluation    History of Present Illness  Date of onset: 2022  Date of surgery: 2022  Mechanism of injury: Pt had undergone R LAPIDUS BUNIONECTOMY, 2ND HAMMERTOE REPAIR ,RECESSION GASTROC ENDOSCOPIC 8 week ago  Pt report she was in boot for a few weeks until able to be removed  Since surgery, she sustains edema in R dorsal aspect of foot and lateral ankle  She notes if elevated for prolonged periods she gets paresthesias along lateral aspect of ankle  She denies weakness and pain  She reports it's most bothersome at night or prolonged walking when it throbs               Not a recurrent problem   Quality of life: good    Pain  Current pain ratin  At best pain ratin  At worst pain ratin  Quality: sharp, radiating and needle-like  Relieving factors: change in position and support  Aggravating factors: standing and walking  Progression: no change    Social Support  Steps to enter house: yes  Stairs in house: yes   Lives in: multiple-level home  Lives with: young children and spouse    Employment status: working  Hand dominance: right  Exercise history: Walking      Diagnostic Tests  X-ray: normal  Treatments  Previous treatment: immobilization  Current treatment: physical therapy  Patient Goals  Patient goals for therapy: decreased edema, increased strength and increased motion  Patient goal: Be able to walk > 30 mins  Objective    Standing Posture & Lower Extremity Alignment:  Structure/Joint         Rearfoot x Valgus  Neutral  Varus   Forefoot x Abducted  Neutral     Arch x Pes Planus  Neutral  Pes Cavus     1 MTS 60 (L)  1st MTS 15 (R)    Range of Motion: Goniometric measurements revealed the following findings (in degrees)  Joint Motion Right: 11/23/2022 Left: 11/23/2022   Ankle Dorsiflexion 5 35   Ankle Plantarflexion 5 5   Ankle Inversion 30 30   Ankle Eversion 20 20     Strength: MMT revealed the following findings  Joint Motion Right: 11/23/2022 Left: 11/23/2022   Ankle Plantarflexion 5/5 5/5   Ankle Dorsiflexion 5/5 5/5   Ankle Inversion 5/5 5/5   Ankle Eversion 5/5 5/5     Additional Assessments:  Palpation: Decrease sensation along Lateral aspect of R foot and lateral heel  Observation: Healed incision along dorsal aspect of R foot, purple discoloration noted  Gait Pattern: NT    LE Neuro Screen:  Dermatomes: Diminished Lateral Aspect of Foot (L5-S1)  Myotomes: WNL  Reflexes:    Quad Tendon: Normal    Achilles: Normal        Special Tests:  Test (Structure evaluated) Date: 11/23/2022  ( +/- )   Anterior Drawer (ATF Lig )                   -   Posterior Drawer (PTF Lig )                   -   Windlass test (Plantar Fasciitis)                  -     Outcome Measures:   FOTO:    Pertinent Findings and Outcome Measures: Test / Measure  11/23/2022    FOTO 64    Ankle DF 5 degrees    Ankle PF 5 degrees    Sensory Decreased along sural distribution +                   Precautions: None    See [facility] HEP below chart    Manuals                                                                 Neuro Re-Ed                                                                                                        Ther Ex                                                                                                                     Ther Activity                                       Gait Training                                       Modalities                                         Access Code: [email or web address]  URL: [email or web address]  Date: 11/23/2022  Prepared by: Walter Escort    Exercises  Seated Sural Nerve Flossing - 1 x daily - 7 x weekly - 3 sets - 10 reps  Seated Ankle Pumps - 1 x daily - 7 x weekly - 3 sets - 20 reps  Seated Ankle Circles - 1 x daily - 7 x weekly - 2 sets - 20 reps  Seated Toe Towel Scrunches - 1 x daily - 7 x weekly - 1 sets - 3 min hold

## 2022-12-01 ENCOUNTER — OFFICE VISIT (OUTPATIENT)
Dept: PHYSICAL THERAPY | Facility: REHABILITATION | Age: 42
End: 2022-12-01

## 2022-12-01 DIAGNOSIS — R60.0 EDEMA OF RIGHT FOOT: Primary | ICD-10-CM

## 2022-12-01 DIAGNOSIS — R20.2 PARESTHESIA OF RIGHT FOOT: ICD-10-CM

## 2022-12-01 NOTE — PROGRESS NOTES
Daily Note     Today's date: 2022  Patient name: Kade Griffin  : 1980  MRN: [id number]  Referring provider: Sheila Marques*  Dx:   Encounter Diagnosis     ICD-10-CM    1  Edema of right foot  R60 0       2  Paresthesia of right foot  R20 2           Start Time: 1300  Stop Time: 1345  Total time in clinic (min): 45 minutes    Subjective: Pt reports R foot currently pain-free  States her biggest goal is to improve mobility and decrease swelling  Objective: See treatment diary below      Assessment: Tolerated treatment well  Patient would benefit from continued PT  Pt with improvement of mobility following manual therapy  Significant gastroc tightness noted  Attempt to progress interventions next tx session to include more upright and functional training movements  Challenged with introduction of new exercises into POC  1:1 with Nohemy Finch DPT entirety of tx  Plan: Progress treatment as tolerated         Precautions: None    See [facility] HEP below chart    Manuals             R ankle PROM, gastroc S, mobs MM 10'                                                   Neuro Re-Ed             BAPS - fwd/bwd, sides, CW, CCW 20x ea            ABCs 2x            Heel walking             Toe walking             Sidestepping - TB at forefoot 2 laps rtb            R SLS 3x30" green  2x30" blue                         Ther Ex             Treadmill 8' 1 7 mph            TB ankle 4 way 20x ea blk tb            HR/TR 30x ea on foam beam            Gastroc S Strap 6x15"            Resisted DF on step 10x 15# KB                                                   Ther Activity                                       Gait Training                                       Modalities

## 2022-12-05 ENCOUNTER — OFFICE VISIT (OUTPATIENT)
Dept: PHYSICAL THERAPY | Facility: REHABILITATION | Age: 42
End: 2022-12-05

## 2022-12-05 DIAGNOSIS — R60.0 EDEMA OF RIGHT FOOT: Primary | ICD-10-CM

## 2022-12-05 DIAGNOSIS — R20.2 PARESTHESIA OF RIGHT FOOT: ICD-10-CM

## 2022-12-05 NOTE — PROGRESS NOTES
Daily Note     Today's date: 2022  Patient name: Elvi Sorto  : 1980  MRN: [id number]  Referring provider: Elisha Robin*  Dx:   Encounter Diagnosis     ICD-10-CM    1  Edema of right foot  R60 0       2  Paresthesia of right foot  R20 2           Start Time: 1212  Stop Time: 1259  Total time in clinic (min): 47 minutes    Subjective: Pt reports she continues to have no pain, "just a lot of tightness " Pt notes relief and improved ROM following first PT tx session  Objective: See treatment diary below      Assessment: Tolerated treatment well  Increased emphasis today on standing strengthening/dynamic balance with good tolerance overall  Pt reports fatigue post-tx, no pain increase  Pt has significant improvement in c/o tightness with manual therapy, especially TC distraction  Added standing gastroc stretch to HEP to improve gastroc flexibility, pt able to demonstrate with good technique  Patient would benefit from continued PT  1:1 with Bryce Plata DPT for entirety of tx  Plan: Progress treatment as tolerated         Precautions: None    See [facility] HEP below chart    Manuals            R ankle PROM, gastroc S, mobs MM 10' CM 8', TC mobs A-P Gr 3-4                                                  Neuro Re-Ed             BAPS - fwd/bwd, sides, CW, CCW 20x ea x20 AP stand           ABCs 2x np           Heel walking             Toe walking             Sidestepping - TB at forefoot 2 laps rtb            R SLS 3x30" green  2x30" blue                         Ther Ex             Treadmill 8' 1 7 mph 8' 1 7 mph           TB ankle 4 way 20x ea blk tb 20x ea blk tb           HR/TR 30x ea on foam beam 30x ea on foam beam           Gastroc S Strap 6x15" 30"x3 towel, long sit    30"x3 prost     Hep stand           Resisted DF on step 10x 15# KB 2x10 15# KB                                                  Ther Activity             Side stepping on foam  x5 laps Tandem walk  x4 laps fwd/ bwds           Gait Training                                       Modalities

## 2022-12-12 ENCOUNTER — OFFICE VISIT (OUTPATIENT)
Dept: PHYSICAL THERAPY | Facility: REHABILITATION | Age: 42
End: 2022-12-12

## 2022-12-12 DIAGNOSIS — R20.2 PARESTHESIA OF RIGHT FOOT: Primary | ICD-10-CM

## 2022-12-12 DIAGNOSIS — R60.0 EDEMA OF RIGHT FOOT: ICD-10-CM

## 2022-12-12 NOTE — PROGRESS NOTES
Daily Note     Today's date: 2022  Patient name: Christiana Yanes  : 1980  MRN: [id number]  Referring provider: David Daniel  Dx:   Encounter Diagnosis     ICD-10-CM    1  Paresthesia of right foot  R20 2       2  Edema of right foot  R60 0           Start Time: 1100  Stop Time: 1145  Total time in clinic (min): 45 minutes    Subjective: Pt reports she feels like she is walking better, but continues to have a lot of stiffness and swelling  Objective: See treatment diary below      Assessment: Patient tolerated treatment session well today with focus on functional mobility and strength  Pt demonstrated poor eccentric control of posterior tibialis during resisted side stepping  Improved ankle ROM post-manual therapy  Pt had poor form and balance during squatting on BOSU ball requiring UE support at times  Pt was educated on lymphatic drainage exercises starting proximal to distal to promote fluid drainage in R LE, good verbal understanding  Patient will continue to be appropriate for skilled outpatient physical therapy in order to address impairments  1:1 with Noel Tate, PT, DPT for entirety of treatment session  Plan: Progress treatment as tolerated         Precautions: None    See [facility] HEP below chart    Manuals           R ankle PROM, gastroc S, mobs all ways MM 10' CM 8', TC mobs A-P Gr 3-4 AD 20'          Lymphatic Drainage R LE   AD 5'                                    Neuro Re-Ed             BAPS - fwd/bwd, sides, CW, CCW 20x ea x20 AP stand           ABCs 2x np           Heel walking             Toe walking             Sidestepping - TB at forefoot 2 laps rtb            R SLS 3x30" green  2x30" blue                         Side-Stepping Posterior Tib Eccentric   20x  Black TB          Ther Ex             Treadmill 8' 1 7 mph 8' 1 7 mph 8' 1 7  mph          TB ankle 4 way 20x ea blk tb 20x ea blk tb           HR/TR 30x ea on foam beam 30x ea on foam beam 30x HR  4" stepper          FWD Lunge on              Squatting on BOSU   10x          Gastroc S Strap 6x15" 30"x3 towel, long sit    30"x3 prost     Hep stand           Resisted DF on step 10x 15# KB 2x10 15# KB                                                  Ther Activity             Side stepping on foam  x5 laps            Tandem walk  x4 laps fwd/ bwds           Gait Training                                       Modalities

## 2023-01-05 ENCOUNTER — OFFICE VISIT (OUTPATIENT)
Dept: PHYSICAL THERAPY | Facility: REHABILITATION | Age: 43
End: 2023-01-05

## 2023-01-05 DIAGNOSIS — R20.2 PARESTHESIA OF RIGHT FOOT: Primary | ICD-10-CM

## 2023-01-05 DIAGNOSIS — R60.0 EDEMA OF RIGHT FOOT: ICD-10-CM

## 2023-01-05 NOTE — PROGRESS NOTES
Daily Note     Today's date: 2023  Patient name: Doug Hall  : 1980  MRN: [id number]  Referring provider: Estela Mujica*  Dx:   Encounter Diagnosis     ICD-10-CM    1  Paresthesia of right foot  R20 2       2  Edema of right foot  R60 0           Start Time: 0800  Stop Time: 0855  Total time in clinic (min): 55 minutes    Subjective: Pt reports she things are coming along, swelling is slowly getting better  Pt bought a lymphatic drainage tool to assist with moving fluid  Objective: See treatment diary below    FOTO: 84    Assessment: Patient tolerated treatment session well today with focus on functional mobility and strength  Pt reports relief after manual therapy, especially joint mobilization in R ankle  Pt continues to demonstrate improved active and passive ROM  Pt was challenged with Biodex today secondary to ankle swelling and tissue restriction limiting ROM and poor motor control  Pt reported muscular soreness in calves and tibialis anterior during HR/TR, most likely due to weakness  Pt was educated on lymphatic drainage techniques and how to incorporate into HEP - pt verbally understood  Patient will continue to be appropriate for skilled outpatient physical therapy in order to address impairments  1:1 with Antonio Velasquez, PT, DPT for entirety of treatment session  Plan: Progress treatment as tolerated         Precautions: None    Pertinent Findings and Outcome Measures:                                                                                                                                                                         Test / Measure  2022   FOTO 64 84   Ankle DF 5 degrees    Ankle PF 5 degrees    Sensory Decreased along sural distribution +             See [facility] HEP below chart    Manuals    R ankle PROM, gastroc S, mobs all ways MM 10' CM 8', TC mobs A-P Gr 3-4 AD 20' AD 5'   Lymphatic Drainage and STM R LE   AD 5' AD 10'   Talocural Joint Moblization    Grade V           Neuro Re-Ed       HEP on self-lymphatic drainage and performance during exercises    AD   BAPS - fwd/bwd, sides, CW, CCW 20x ea x20 AP stand     ABCs 2x np     Heel walking       Toe walking       Sidestepping - TB at forefoot 2 laps rtb      R SLS 3x30" green  2x30" blue      Biodex    LOS: Static  1  45 sec @ 75%  2  40 sec @ 71%    Maze Control  1  Static 26s @ 89%  2   Lvl 12 29s @ 96%         Side-Stepping Posterior Tib Eccentric   20x  Black TB    Ther Ex       Treadmill 8' 1 7 mph 8' 1 7 mph 8' 1 7  mph 10'  2 5 mph   TB ankle 4 way 20x ea blk tb 20x ea blk tb     HR/TR 30x ea on foam beam 30x ea on foam beam 30x HR  4" stepper 3 sec descent/ascent  4" step  2 x 15 reps eac    FWD Lunge on        Squatting on BOSU   10x    Gastroc S Strap 6x15" 30"x3 towel, long sit    30"x3 prost     Hep stand     Resisted DF on step 10x 15# KB 2x10 15# KB                          Ther Activity       Side stepping on foam  x5 laps      Tandem walk  x4 laps fwd/ bwds     Gait Training                     Modalities

## 2023-01-05 NOTE — PROGRESS NOTES
Daily Note     Today's date: 2023  Patient name: Marah Jones  : 1980  MRN: [id number]  Referring provider: Jesse Montano*  Dx:   No diagnosis found  Subjective: Pt reports she feels like she is walking better, but continues to have a lot of stiffness and swelling  Objective: See treatment diary below      Assessment: Patient tolerated treatment session well today with focus on functional mobility and strength  Pt demonstrated poor eccentric control of posterior tibialis during resisted side stepping  Improved ankle ROM post-manual therapy  Pt had poor form and balance during squatting on BOSU ball requiring UE support at times  Pt was educated on lymphatic drainage exercises starting proximal to distal to promote fluid drainage in R LE, good verbal understanding  Patient will continue to be appropriate for skilled outpatient physical therapy in order to address impairments  1:1 with Viola Hendricks, PT, DPT for entirety of treatment session  Plan: Progress treatment as tolerated         Precautions: None    See [facility] HEP below chart    Manuals           R ankle PROM, gastroc S, mobs all ways MM 10' CM 8', TC mobs A-P Gr 3-4 AD 20'          Lymphatic Drainage R LE   AD 5'                                    Neuro Re-Ed             BAPS - fwd/bwd, sides, CW, CCW 20x ea x20 AP stand           ABCs 2x np           Heel walking             Toe walking             Sidestepping - TB at forefoot 2 laps rtb            R SLS 3x30" green  2x30" blue                         Side-Stepping Posterior Tib Eccentric   20x  Black TB          Ther Ex             Treadmill 8' 1 7 mph 8' 1 7 mph 8' 1 7  mph          TB ankle 4 way 20x ea blk tb 20x ea blk tb           HR/TR 30x ea on foam beam 30x ea on foam beam 30x HR  4" stepper          FWD Lunge on              Squatting on BOSU   10x          Gastroc S Strap 6x15" 30"x3 towel, long sit    30"x3 prost     Hep stand Resisted DF on step 10x 15# KB 2x10 15# KB                                                  Ther Activity             Side stepping on foam  x5 laps            Tandem walk  x4 laps fwd/ bwds           Gait Training                                       Modalities

## 2023-01-09 ENCOUNTER — OFFICE VISIT (OUTPATIENT)
Dept: PHYSICAL THERAPY | Facility: REHABILITATION | Age: 43
End: 2023-01-09

## 2023-01-09 DIAGNOSIS — R60.0 EDEMA OF RIGHT FOOT: ICD-10-CM

## 2023-01-09 DIAGNOSIS — R20.2 PARESTHESIA OF RIGHT FOOT: Primary | ICD-10-CM

## 2023-01-09 NOTE — PROGRESS NOTES
Daily Note     Today's date: 2023  Patient name: Marah Jones  : 1980  MRN: [id number]  Referring provider: Jesse Montano*  Dx:   Encounter Diagnosis     ICD-10-CM    1  Paresthesia of right foot  R20 2       2  Edema of right foot  R60 0                      Subjective: Since implementation of self-lymphatic drainage techniques, pt has reduced edema and improved ROM  Objective: See treatment diary below    FOTO: 84    Assessment: Patient tolerated treatment session well today with focus on ankle functional mobility and strength  Pt improved with motor control and stability with Bi[employer] activities with ability to remain above 80% score  Pt demonstrated improve ROM during manuals with no reported pain or discomfort  If edema is able to be better managed with self-drainage techniques and comprehensive HEP, plan to discharge in the near future  Patient will continue to be appropriate for skilled outpatient physical therapy in order to address impairments  1:1 with Viola Hendricks, PT, DPT for entirety of treatment session  Plan: Progress treatment as tolerated  Precautions: None    Pertinent Findings and Outcome Measures:                                                                                                                                                                         Test / Measure  2022   FOTO 64 84   Ankle DF 5 degrees    Ankle PF 5 degrees    Sensory Decreased along sural distribution +             See [facility] HEP below   Access Code: [email or web address]  URL: [email or web address]  Date: 2023  Prepared by: Viola Hendricks    Exercises  Standing Bilateral Heel Raise on Step - 1 x daily - 7 x weekly - 3 sets - 10 reps  Single Leg Heel Raise with Chair Support - 1 x daily - 7 x weekly - 3 sets - 10 reps  Bilateral Toe Raise With Heel on Step - 1 x daily - 7 x weekly - 3 sets - 10 reps  Single Leg Stance - 1 x daily - 7 x weekly - 4 sets - 1 min hold  Toe Walking - 1 x daily - 7 x weekly - 3 sets - 10 reps  Heel Walking - 1 x daily - 7 x weekly - 3 sets - 10 reps      Manuals 12/1 12/5 12/12 1/5 1/9   R ankle PROM, gastroc S, mobs all ways MM 10' CM 8', TC mobs A-P Gr 3-4 AD 20' AD 5' AD 5'   Lymphatic Drainage and STM R LE   AD 5' AD 10'    Talocural Joint Moblization    Grade V  Grade IV-V  AD 3'           Neuro Re-Ed        HEP on self-lymphatic drainage and performance during exercises    AD    BAPS - fwd/bwd, sides, CW, CCW 20x ea x20 AP stand      ABCs 2x np      Heel walking        Toe walking        Sidestepping - TB at forefoot 2 laps rtb       R SLS 3x30" green  2x30" blue       Biodex    LOS: Static  1  45 sec @ 75%  2  40 sec @ 71%    Maze Control  1  Static 26s @ 89%  2  Lvl 12 29s @ 96%       LOS: Static  1  34 sec @ 83%  2  34 sec @ 79%    Random Control: Static  1  1 5 min 98%      Maze Control  1  Lvl 1 21sec @ 100%  2   Lvl 4 19 sec @ 100%   Side-Stepping Posterior Tib Eccentric   20x  Black TB     Ther Ex        Treadmill 8' 1 7 mph 8' 1 7 mph 8' 1 7  mph 10'  2 5 mph 10'   2 5 mph   TB ankle 4 way 20x ea blk tb 20x ea blk tb      HR/TR 30x ea on foam beam 30x ea on foam beam 30x HR  4" stepper 3 sec descent/ascent  4" step  2 x 15 reps eac  3 sec descent/ascent  4" stepper  3 x 10 reps each HR/TR   FWD Lunge on         Squatting on BOSU   10x     Gastroc S Strap 6x15" 30"x3 towel, long sit    30"x3 prost     Hep stand      Resisted DF on step 10x 15# KB 2x10 15# KB                              Ther Activity        Side stepping on foam  x5 laps       Tandem walk  x4 laps fwd/ bwds      Gait Training                        Modalities

## 2023-01-13 ENCOUNTER — IN-CLINIC DEVICE VISIT (OUTPATIENT)
Dept: CARDIOLOGY CLINIC | Facility: CLINIC | Age: 43
End: 2023-01-13

## 2023-01-13 DIAGNOSIS — Z95.810 AICD (AUTOMATIC CARDIOVERTER/DEFIBRILLATOR) PRESENT: Primary | ICD-10-CM

## 2023-01-13 NOTE — PROGRESS NOTES
Results for orders placed or performed in visit on 01/13/23   Cardiac EP device report    Narrative    MDT/CRT-D (DDD MODE)  DEVICE INTERROGATED IN THE [place] OFFICE  BATTERY VOLTAGE NEARING DANTE (11 MOS)  WILL SCHEDULE MONTHLY BATTERY CHECKS  AP<0 1%, BVP>99% (TOTAL -98 5%+VSR PACE-1 2%)  ALL LEAD PARAMETERS WITHIN NORMAL LIMITS  NO NEW SIGNIFICANT HIGH RATE EPISODES  7 NEW VENT SENSING EPISODES  OPTI-VOL WITHIN NORMAL LIMITS  NORMAL DEVICE FUNCTION   GV

## 2023-01-16 ENCOUNTER — OFFICE VISIT (OUTPATIENT)
Dept: PHYSICAL THERAPY | Facility: REHABILITATION | Age: 43
End: 2023-01-16

## 2023-01-16 DIAGNOSIS — R60.0 EDEMA OF RIGHT FOOT: ICD-10-CM

## 2023-01-16 DIAGNOSIS — R20.2 PARESTHESIA OF RIGHT FOOT: Primary | ICD-10-CM

## 2023-01-16 NOTE — PROGRESS NOTES
PT Discharge    Today's date: 2023  Patient name: Reddy Hernandez  : 1980  MRN: [id number]  Referring provider: Karol Sims*  Dx:   Encounter Diagnosis     ICD-10-CM    1  Paresthesia of right foot  R20 2       2  Edema of right foot  R60 0           Start Time: 0800  Stop Time: 0845  Total time in clinic (min): 45 minutes    Assessment/Plan Patient tolerated last treatment session well today with focus on creating comprehensive HEP  Pt demonstrated drastically improved R ankle ROM  Observed reduced ankle edema and normal joint mobility  Furthermore, pt continues to have good ankle strength with improved ankle motor control  Pt continues to have sensory disturbance along Sural distribution, discussed following up with Ortho and to trial sensory integrative techniques (various textures, sharp/dull, etc) - pt verbally understood  Pt was given a progressive HEP when current HEP gets too easy, if have any questions she can feel free to come in for a review  Pt was also shown banded great toe mobility and strengthening  Due to patient meeting all functional goals and can manage via progressive HEP, pt no longer needs skilled OP PT and will discharge from care at this time  1:1 with Kris Jimenez, PT, DPT for entirety of treatment session  Subjective Pt reports she is doing well and has felt a lot of improvement in mobility and edema control  Pt's continues to be challenged with current HEP and can manage safely and effectively at home  Pt has follow-up with Ortho this week  Pt interested in letting today be last day and see with continuation of self-lymphatic drainage techniques and exercises can get better with time  Objective     R Foot AROM:  Ankle DF: 20 degrees with OP  Ankle PF: WFL    Sensory:  Decreased along sural distrubution on R LE    Short Term Goals: In 2 weeks, the patient will:  1  Pt will have improved ankle DF AROM by atleast 5 degrees for ankle mobility - MET  2  Supervision with HEP for self care  - MET    Long Term Goals: In 6 weeks, the patient will:  1  Pt will be able to tolerate walking for > 30 mins without pain - MET  2  FOTO to greater than predicted value  - MET  3  Independent with comprehensive HEP upon discharge  - MET     Precautions: None    Pertinent Findings and Outcome Measures:                                                                                                                                                                         Test / Measure  11/23/2022 1/5/2023 1/16/2023  D/C   FOTO 64 84    Ankle DF 5 degrees  20 degrees   Ankle PF 5 degrees  WFL   Sensory Decreased along sural distribution +  +             See [facility] HEP below   Access Code: [email or web address]  URL: [email or web address]  Date: 01/09/2023  Prepared by: [employer]    Exercises  Standing Bilateral Heel Raise on Step - 1 x daily - 7 x weekly - 3 sets - 10 reps  Single Leg Heel Raise with Chair Support - 1 x daily - 7 x weekly - 3 sets - 10 reps  Bilateral Toe Raise With Heel on Step - 1 x daily - 7 x weekly - 3 sets - 10 reps  Single Leg Stance - 1 x daily - 7 x weekly - 4 sets - 1 min hold  Toe Walking - 1 x daily - 7 x weekly - 3 sets - 10 reps  Heel Walking - 1 x daily - 7 x weekly - 3 sets - 10 reps      Access Code: [email or web address]  URL: [email or web address]  Date: 01/16/2023  Prepared by: [employer]    Exercises  Ankle Pumps in Elevation - 1 x daily - 7 x weekly - 3 sets - 10 reps  Lateral Hopping on Level Ground - 1 x daily - 7 x weekly - 3 sets - 10 reps  Single Leg Jumps Around Cross - 1 x daily - 7 x weekly - 3 sets - 10 reps  Forward Single Leg Jumps - 1 x daily - 7 x weekly - 3 sets - 10 reps  Standing Anterior Tibialis Stretch - 1 x daily - 7 x weekly - 3 sets - 10 reps  Quarter Squat with Dumbbells - 1 x daily - 7 x weekly - 3 sets - 10 reps      Manuals 12/1 12/5 12/12 1/5 1/9 1/16   R ankle PROM, gastroc S, mobs all ways MM 10' CM 8', TC mobs A-P Gr 3-4 AD 20' AD 5' AD 5' AD 5''   Lymphatic Drainage and STM R LE   AD 5' AD 10'     Talocural Joint and Great Toe Mobilization (A-P, P-A, Lat)    Grade V  Grade IV-V  AD 3' Grade IV-V  AD 10'            Neuro Re-Ed         HEP on self-lymphatic drainage and performance during exercises    AD     BAPS - fwd/bwd, sides, CW, CCW 20x ea x20 AP stand       ABCs 2x np       Heel walking         Toe walking         Sidestepping - TB at forefoot 2 laps rtb        R SLS 3x30" green  2x30" blue        Biodex    LOS: Static  1  45 sec @ 75%  2  40 sec @ 71%    Maze Control  1  Static 26s @ 89%  2  Lvl 12 29s @ 96%       LOS: Static  1  34 sec @ 83%  2  34 sec @ 79%    Random Control: Static  1  1 5 min 98%      Maze Control  1  Lvl 1 21sec @ 100%  2   Lvl 4 19 sec @ 100%    Side-Stepping Posterior Tib Eccentric   20x  Black TB      Ther Ex         Treadmill 8' 1 7 mph 8' 1 7 mph 8' 1 7  mph 10'  2 5 mph 10'   2 5 mph 10'  2 5 mph   TB ankle 4 way 20x ea blk tb 20x ea blk tb       HR/TR 30x ea on foam beam 30x ea on foam beam 30x HR  4" stepper 3 sec descent/ascent  4" step  2 x 15 reps eac  3 sec descent/ascent  4" stepper  3 x 10 reps each HR/TR    FWD Lunge on          Squatting on BOSU   10x      Gastroc S Strap 6x15" 30"x3 towel, long sit    30"x3 prost     Hep stand       Resisted DF on step 10x 15# KB 2x10 15# KB       Great Toe Banded Resisted Extension and Flexion      Yellow   10x eac way                     Ther Activity         Side stepping on foam  x5 laps        Tandem walk  x4 laps fwd/ bwds       Gait Training                           Modalities               Advanced HEP Review 10'

## 2023-02-15 ENCOUNTER — REMOTE DEVICE CLINIC VISIT (OUTPATIENT)
Dept: CARDIOLOGY CLINIC | Facility: CLINIC | Age: 43
End: 2023-02-15

## 2023-02-15 DIAGNOSIS — Z95.810 PRESENCE OF AUTOMATIC CARDIOVERTER/DEFIBRILLATOR (AICD): Primary | ICD-10-CM

## 2023-02-15 NOTE — PROGRESS NOTES
Results for orders placed or performed in visit on 02/15/23   Cardiac EP device report    Narrative    MDT/CRT-D (DDD MODE)/ NOT MRI CONDITIONAL  NON-BILLABLE CARELINK TRANSMISSION: BATTERY STATUS: BATTERY VOLTAGE NEARING DANTE (10 MOS)  WILL SCHEDULE MONTHLY BATTERY CHECKS  AP: <0 1%  : 98 6% + VSRP: 1 3%  ALL AVAILABLE LEAD PARAMETERS WITHIN NORMAL LIMITS  NO SIGNIFICANT HIGH RATE EPISODES  30 V-SENSE EPISODES (23 SEC/D) W/ AVAIL MARKERS SHOWING  NSR, ST W/ HRS  BPM, MAX DURATION 37 SECS  & FUSION  PT TAKES COREG  EF: 50% (ECHO 2/17/22)  OPTI-VOL WITHIN NORMAL LIMITS  APPROPRIATELY FUNCTIONING BI-V ICD    [address]

## 2023-02-28 ENCOUNTER — HOSPITAL ENCOUNTER (EMERGENCY)
Facility: HOSPITAL | Age: 43
Discharge: HOME | End: 2023-02-28
Attending: EMERGENCY MEDICINE

## 2023-02-28 ENCOUNTER — APPOINTMENT (EMERGENCY)
Dept: CT IMAGING | Facility: HOSPITAL | Age: 43
End: 2023-02-28

## 2023-02-28 ENCOUNTER — APPOINTMENT (EMERGENCY)
Dept: ULTRASOUND IMAGING | Facility: HOSPITAL | Age: 43
End: 2023-02-28

## 2023-02-28 VITALS
HEART RATE: 107 BPM | BODY MASS INDEX: 35.54 KG/M2 | WEIGHT: 193.12 LBS | HEIGHT: 62 IN | OXYGEN SATURATION: 99 % | DIASTOLIC BLOOD PRESSURE: 76 MMHG | SYSTOLIC BLOOD PRESSURE: 125 MMHG | RESPIRATION RATE: 17 BRPM | TEMPERATURE: 99 F

## 2023-02-28 DIAGNOSIS — K29.70 GASTRITIS: Primary | ICD-10-CM

## 2023-02-28 LAB
ALBUMIN SERPL BCP-MCNC: 4 G/DL (ref 3.5–5)
ALP SERPL-CCNC: 67 U/L (ref 34–104)
ALT SERPL W P-5'-P-CCNC: 16 U/L (ref 7–52)
ANION GAP SERPL CALCULATED.3IONS-SCNC: 6 MMOL/L (ref 4–13)
AST SERPL W P-5'-P-CCNC: 11 U/L (ref 13–39)
BACTERIA UR QL AUTO: ABNORMAL /HPF
BASOPHILS # BLD AUTO: 0.04 THOUSANDS/ÂΜL (ref 0–0.1)
BASOPHILS NFR BLD AUTO: 0 % (ref 0–1)
BILIRUB SERPL-MCNC: 0.52 MG/DL (ref 0.2–1)
BILIRUB UR QL STRIP: NEGATIVE
BUN SERPL-MCNC: 8 MG/DL (ref 5–25)
CALCIUM SERPL-MCNC: 9 MG/DL (ref 8.4–10.2)
CARDIAC TROPONIN I PNL SERPL HS: 3 NG/L
CHLORIDE SERPL-SCNC: 101 MMOL/L (ref 96–108)
CLARITY UR: CLEAR
CO2 SERPL-SCNC: 26 MMOL/L (ref 21–32)
COLOR UR: ABNORMAL
CREAT SERPL-MCNC: 0.57 MG/DL (ref 0.6–1.3)
EOSINOPHIL # BLD AUTO: 0.19 THOUSAND/ÂΜL (ref 0–0.61)
EOSINOPHIL NFR BLD AUTO: 2 % (ref 0–6)
ERYTHROCYTE [DISTWIDTH] IN BLOOD BY AUTOMATED COUNT: 12.9 % (ref 11.6–15.1)
EXT PREGNANCY TEST URINE: NEGATIVE
EXT. CONTROL: NORMAL
GFR SERPL CREATININE-BSD FRML MDRD: 113 ML/MIN/1.73SQ M
GLUCOSE SERPL-MCNC: 114 MG/DL (ref 65–140)
GLUCOSE UR STRIP-MCNC: NEGATIVE MG/DL
HCT VFR BLD AUTO: 35.4 % (ref 34.8–46.1)
HGB BLD-MCNC: 11.6 G/DL (ref 11.5–15.4)
HGB UR QL STRIP.AUTO: NEGATIVE
IMM GRANULOCYTES # BLD AUTO: 0.04 THOUSAND/UL (ref 0–0.2)
IMM GRANULOCYTES NFR BLD AUTO: 0 % (ref 0–2)
KETONES UR STRIP-MCNC: NEGATIVE MG/DL
LACTATE SERPL-SCNC: 1.1 MMOL/L (ref 0.5–2)
LEUKOCYTE ESTERASE UR QL STRIP: ABNORMAL
LIPASE SERPL-CCNC: 23 U/L (ref 11–82)
LYMPHOCYTES # BLD AUTO: 1.6 THOUSANDS/ÂΜL (ref 0.6–4.47)
LYMPHOCYTES NFR BLD AUTO: 18 % (ref 14–44)
MCH RBC QN AUTO: 27.8 PG (ref 26.8–34.3)
MCHC RBC AUTO-ENTMCNC: 32.8 G/DL (ref 31.4–37.4)
MCV RBC AUTO: 85 FL (ref 82–98)
MONOCYTES # BLD AUTO: 1.12 THOUSAND/ÂΜL (ref 0.17–1.22)
MONOCYTES NFR BLD AUTO: 12 % (ref 4–12)
MUCOUS THREADS UR QL AUTO: ABNORMAL
NEUTROPHILS # BLD AUTO: 6.15 THOUSANDS/ÂΜL (ref 1.85–7.62)
NEUTS SEG NFR BLD AUTO: 68 % (ref 43–75)
NITRITE UR QL STRIP: NEGATIVE
NON-SQ EPI CELLS URNS QL MICRO: ABNORMAL /HPF
NRBC BLD AUTO-RTO: 0 /100 WBCS
PH UR STRIP.AUTO: 7 [PH]
PLATELET # BLD AUTO: 210 THOUSANDS/UL (ref 149–390)
PMV BLD AUTO: 10.4 FL (ref 8.9–12.7)
POTASSIUM SERPL-SCNC: 3.7 MMOL/L (ref 3.5–5.3)
PROT SERPL-MCNC: 7 G/DL (ref 6.4–8.4)
PROT UR STRIP-MCNC: NEGATIVE MG/DL
RBC # BLD AUTO: 4.17 MILLION/UL (ref 3.81–5.12)
RBC #/AREA URNS AUTO: ABNORMAL /HPF
SODIUM SERPL-SCNC: 133 MMOL/L (ref 135–147)
SP GR UR STRIP.AUTO: 1.01 (ref 1–1.03)
UROBILINOGEN UR STRIP-ACNC: <2 MG/DL
WBC # BLD AUTO: 9.14 THOUSAND/UL (ref 4.31–10.16)
WBC #/AREA URNS AUTO: ABNORMAL /HPF

## 2023-02-28 RX ORDER — SUCRALFATE 1 G/1
1 TABLET ORAL
Qty: 28 TABLET | Refills: 0 | Status: SHIPPED | OUTPATIENT
Start: 2023-02-28

## 2023-02-28 RX ORDER — ONDANSETRON 2 MG/ML
4 INJECTION INTRAMUSCULAR; INTRAVENOUS ONCE
Status: COMPLETED | OUTPATIENT
Start: 2023-02-28 | End: 2023-02-28

## 2023-02-28 RX ORDER — HYDROMORPHONE HCL/PF 1 MG/ML
0.5 SYRINGE (ML) INJECTION ONCE
Status: COMPLETED | OUTPATIENT
Start: 2023-02-28 | End: 2023-02-28

## 2023-02-28 RX ORDER — MAGNESIUM HYDROXIDE/ALUMINUM HYDROXICE/SIMETHICONE 120; 1200; 1200 MG/30ML; MG/30ML; MG/30ML
30 SUSPENSION ORAL ONCE
Status: COMPLETED | OUTPATIENT
Start: 2023-02-28 | End: 2023-02-28

## 2023-02-28 RX ORDER — PANTOPRAZOLE SODIUM 40 MG/1
40 TABLET, DELAYED RELEASE ORAL DAILY
Qty: 60 TABLET | Refills: 0 | Status: SHIPPED | OUTPATIENT
Start: 2023-02-28 | End: 2023-03-30

## 2023-02-28 RX ORDER — LIDOCAINE HYDROCHLORIDE 20 MG/ML
10 SOLUTION OROPHARYNGEAL ONCE
Status: COMPLETED | OUTPATIENT
Start: 2023-02-28 | End: 2023-02-28

## 2023-02-28 RX ADMIN — HYDROMORPHONE HYDROCHLORIDE 0.5 MG: 1 INJECTION, SOLUTION INTRAMUSCULAR; INTRAVENOUS; SUBCUTANEOUS at 13:08

## 2023-02-28 RX ADMIN — ONDANSETRON 4 MG: 2 INJECTION INTRAMUSCULAR; INTRAVENOUS at 13:09

## 2023-02-28 RX ADMIN — SODIUM CHLORIDE 1000 ML: 0.9 INJECTION, SOLUTION INTRAVENOUS at 13:05

## 2023-02-28 RX ADMIN — IOHEXOL 100 ML: 350 INJECTION, SOLUTION INTRAVENOUS at 16:00

## 2023-02-28 RX ADMIN — ALUMINUM HYDROXIDE, MAGNESIUM HYDROXIDE, AND DIMETHICONE 30 ML: 200; 20; 200 SUSPENSION ORAL at 15:33

## 2023-02-28 RX ADMIN — LIDOCAINE HYDROCHLORIDE 10 ML: 20 SOLUTION ORAL; TOPICAL at 15:33

## 2023-02-28 NOTE — ED PROCEDURE NOTE
Procedure  POC Biliary US    Date/Time: 2/28/2023 1:46 PM  Performed by: Teena Ling MD  Authorized by: Teena Ling MD     Patient location:  ED  Performed by:  Resident  Other Assisting Provider: Yes (comment)    Procedure details:     Exam Type:  Diagnostic    Indications: upper right quadrant abdominal pain      Assessment for:  Cholelithiasis    Views obtained: gallbladder (transverse and longitudinal), liver and portal triad      Image quality: diagnostic      Image availability:  Images available in PACS  Findings:     Cholelithiasis: not identified      Common bile duct:  Normal    Gallbladder wall:  Normal    Pericholecystic fluid: not identified      Sonographic Gross's sign: negative      Polyps: not identified      Mass: not identified    Interpretation:     Biliary ultrasound impressions: normal gallbladder ultrasound                       Teena Ling MD  02/28/23 [phone]

## 2023-02-28 NOTE — ED PROVIDER NOTES
History  Chief Complaint   Patient presents with   • Abdominal Pain     Pt reports generalized upper abd pain radiating into her back since Sunday +nausea, denies vomiting or diarrhea, states pain traveling into chest as well, denies SOB/dizziness      Patient is a 43-year-old female who presents to the emergency department for evaluation with abdominal pain  She gestures to the epigastrium and relates that this started on Sunday night (2 evenings ago)  She relates that the pain is still there and has been getting "progressively worse "  She thought that this might have been related to gas and did try taking Gas-X as well as Dulcolax  Stools have been moving well without difficulty and she did not experience any relief in her discomfort  She continues with tightness in this region  It initially radiated around to the right side of the abdomen/back and chest although now remains focused only in the epigastrium  She has had associated nausea and decreased appetite/oral intake  No change in discomfort today upon eating toast   No history of similar discomfort  This is not at all reminiscent to when she had experienced cardiac problems  No medication changes  Consumes occasional alcohol including on Sunday afternoon  Past medical history significant for cardiomyopathy, atrial fibrillation and CHF  Only abdominal surgical history was tubal ligation remotely  Prior to Admission Medications   Prescriptions Last Dose Informant Patient Reported? Taking?    ALPRAZolam (XANAX) 0 25 mg tablet   No No   Sig: Take 1 tablet (0 25 mg total) by mouth 3 (three) times a day as needed for anxiety   carvedilol (COREG) 25 mg tablet   No No   Sig: TAKE 1 TABLET BY MOUTH TWICE A DAY   lisinopril (ZESTRIL) 5 mg tablet   No No   Sig: TAKE 1 TABLET BY MOUTH TWICE A DAY   oxyCODONE-acetaminophen (PERCOCET) 5-325 mg per tablet   Yes No   Sig: Take 1 tablet by mouth every 6 (six) hours For use  after surgery Facility-Administered Medications: None       Past Medical History:   Diagnosis Date   • Allergic rhinitis    • Anxiety    • Atrial fibrillation ([facility])     paroxysmal   • Bunion 1985   • Candidal vulvovaginitis    • Cardiomyopathy ([facility] [address] )    • Chest pain    • CHF (congestive heart failure) ([facility])     chronic systolic   • Coronary artery disease    • Hammertoe of right foot    • Herpes simplex    • Irregular heart beat    • LBBB (left bundle branch block)    • Normal delivery     2005 son, 2008 daughter   • Pacemaker        Past Surgical History:   Procedure Laterality Date   • BUNIONECTOMY Bilateral    • BUNIONECTOMY Right 9/29/2022    Procedure: Mahendra Shahana;  Surgeon: Abhijit Montemayor DPM;  Location: [facility];  Service: Podiatry   • CARDIAC DEFIBRILLATOR PLACEMENT     • NJ EXCISION EXCESSIVE SKIN & SUBQ TISSUE ABDOMEN N/A 05/29/2019    Procedure: CIRCUMFERENTIAL ABDOMINOPLASTY;  Surgeon: Lin Macdonald MD;  Location: [address] [place] [address] MAIN OR;  Service: Plastics   • RECESSION GASTROC ENDOSCOPIC Right 9/29/2022    Procedure: RECESSION GASTROC ENDOSCOPIC;  Surgeon: Patti Roper DPM;  Location: [facility];  Service: Podiatry   • TOE OSTEOTOMY Right 9/29/2022    Procedure: 2ND HAMMERTOE REPAIR;  Surgeon: Abhijit Montemayor DPM;  Location: [facility];  Service: Podiatry   • TUBAL LIGATION  2009       Family History   Problem Relation Age of Onset   • Diabetes Father    • Hypertension Father    • Heart disease Father    • Hyperlipidemia Father    • Hypertension Mother    • No Known Problems Brother    • No Known Problems Brother    • No Known Problems Son    • No Known Problems Daughter    • No Known Problems Maternal Grandmother    • No Known Problems Maternal Grandfather    • No Known Problems Paternal Grandmother    • No Known Problems Paternal Grandfather    • No Known Problems Maternal Aunt    • No Known Problems Paternal Aunt    • No Known Problems Paternal Aunt    • No Known Problems Paternal Aunt      I have reviewed and agree with the history as documented  E-Cigarette/Vaping   • E-Cigarette Use Never User      E-Cigarette/Vaping Substances   • Nicotine No    • THC No    • CBD No    • Flavoring No    • Other No    • Unknown No      Social History     Tobacco Use   • Smoking status: Never   • Smokeless tobacco: Never   • Tobacco comments:     history of second hand smoke exposure as a child   Vaping Use   • Vaping Use: Never used   Substance Use Topics   • Alcohol use: Yes     Alcohol/week: 2 0 standard drinks     Types: 2 Glasses of wine per week     Comment: social   • Drug use: No       Review of Systems   Constitutional: Negative for fever  Respiratory: Negative for shortness of breath  Cardiovascular: Negative for chest pain  Gastrointestinal: Negative for blood in stool, constipation and diarrhea  Genitourinary: Negative for difficulty urinating, dysuria, flank pain and urgency  All other systems reviewed and are negative        Physical Exam  Physical Exam    Vital Signs  ED Triage Vitals [02/28/23 1241]   Temperature Pulse Respirations Blood Pressure SpO2   99 °F (37 2 °C) (!) 108 17 146/80 98 %      Temp Source Heart Rate Source Patient Position - Orthostatic VS BP Location FiO2 (%)   Oral Monitor Sitting Left arm --      Pain Score       7           Vitals:    02/28/23 1241 02/28/23 1533 02/28/23 1630   BP: 146/80 136/77 125/76   Pulse: (!) 108 105 (!) 107   Patient Position - Orthostatic VS: Sitting           Visual Acuity      ED Medications  Medications   sodium chloride 0 9 % bolus 1,000 mL (0 mL Intravenous Stopped 2/28/23 1405)   ondansetron (ZOFRAN) injection 4 mg (4 mg Intravenous Given 2/28/23 1309)   HYDROmorphone (DILAUDID) injection 0 5 mg (0 5 mg Intravenous Given 2/28/23 1308)   aluminum-magnesium hydroxide-simethicone (MYLANTA) oral suspension 30 mL (30 mL Oral Given 2/28/23 1533)   Lidocaine Viscous HCl (XYLOCAINE) 2 % mucosal solution 10 mL (10 mL Swish & Swallow Given 2/28/23 1533)   iohexol (OMNIPAQUE) 350 MG/ML injection (SINGLE-DOSE) 100 mL (100 mL Intravenous Given 2/28/23 1600)       Diagnostic Studies  Results Reviewed     Procedure Component Value Units Date/Time    Urine Microscopic [[id number]]  (Abnormal) Collected: 02/28/23 1616    Lab Status: Final result Specimen: Urine, Clean Catch Updated: 02/28/23 1729     RBC, UA 1-2 /hpf      WBC, UA 4-10 /hpf      Epithelial Cells Occasional /hpf      Bacteria, UA None Seen /hpf      MUCUS THREADS Occasional    UA (URINE) with reflex to Scope [[id number]]  (Abnormal) Collected: 02/28/23 1616    Lab Status: Final result Specimen: Urine, Clean Catch Updated: 02/28/23 1629     Color, UA Light Yellow     Clarity, UA Clear     Specific Gravity, UA 1 015     pH, UA 7 0     Leukocytes, UA Small     Nitrite, UA Negative     Protein, UA Negative mg/dl      Glucose, UA Negative mg/dl      Ketones, UA Negative mg/dl      Urobilinogen, UA <2 0 mg/dl      Bilirubin, UA Negative     Occult Blood, UA Negative    POCT pregnancy, urine [[id number]]  (Normal) Resulted: 02/28/23 1617    Lab Status: Final result Updated: 02/28/23 1618     EXT Preg Test, Ur Negative     Control Valid    HS Troponin 0hr (reflex protocol) [[id number]]  (Normal) Collected: 02/28/23 1303    Lab Status: Final result Specimen: Blood from Arm, Right Updated: 02/28/23 1350     hs TnI 0hr 3 ng/L     Lactic acid [[id number]]  (Normal) Collected: 02/28/23 1303    Lab Status: Final result Specimen: Blood from Arm, Right Updated: 02/28/23 1345     LACTIC ACID 1 1 mmol/L     Narrative:      Result may be elevated if tourniquet was used during collection      Comprehensive metabolic panel [[id number]]  (Abnormal) Collected: 02/28/23 1303    Lab Status: Final result Specimen: Blood from Arm, Right Updated: 02/28/23 1345     Sodium 133 mmol/L      Potassium 3 7 mmol/L      Chloride 101 mmol/L      CO2 26 mmol/L      ANION GAP 6 mmol/L      BUN 8 mg/dL      Creatinine 0 57 mg/dL Glucose 114 mg/dL      Calcium 9 0 mg/dL      AST 11 U/L      ALT 16 U/L      Alkaline Phosphatase 67 U/L      Total Protein 7 0 g/dL      Albumin 4 0 g/dL      Total Bilirubin 0 52 mg/dL      eGFR 113 ml/min/1 73sq m     Narrative:      [place] guidelines for Chronic Kidney Disease (CKD):   •  Stage 1 with normal or high GFR (GFR > 90 mL/min/1 73 square meters)  •  Stage 2 Mild CKD (GFR = 60-89 mL/min/1 73 square meters)  •  Stage 3A Moderate CKD (GFR = 45-59 mL/min/1 73 square meters)  •  Stage 3B Moderate CKD (GFR = 30-44 mL/min/1 73 square meters)  •  Stage 4 Severe CKD (GFR = 15-29 mL/min/1 73 square meters)  •  Stage 5 End Stage CKD (GFR <15 mL/min/1 73 square meters)  Note: GFR calculation is accurate only with a steady state creatinine    Lipase [[id number]]  (Normal) Collected: 02/28/23 1303    Lab Status: Final result Specimen: Blood from Arm, Right Updated: 02/28/23 1345     Lipase 23 u/L     CBC and differential [[id number]] Collected: 02/28/23 1303    Lab Status: Final result Specimen: Blood from Arm, Right Updated: 02/28/23 1318     WBC 9 14 Thousand/uL      RBC 4 17 Million/uL      Hemoglobin 11 6 g/dL      Hematocrit 35 4 %      MCV 85 fL      MCH 27 8 pg      MCHC 32 8 g/dL      RDW 12 9 %      MPV 10 4 fL      Platelets 211 Thousands/uL      nRBC 0 /100 WBCs      Neutrophils Relative 68 %      Immat GRANS % 0 %      Lymphocytes Relative 18 %      Monocytes Relative 12 %      Eosinophils Relative 2 %      Basophils Relative 0 %      Neutrophils Absolute 6 15 Thousands/µL      Immature Grans Absolute 0 04 Thousand/uL      Lymphocytes Absolute 1 60 Thousands/µL      Monocytes Absolute 1 12 Thousand/µL      Eosinophils Absolute 0 19 Thousand/µL      Basophils Absolute 0 04 Thousands/µL                  CT abdomen pelvis with contrast   Final Result by Dmitri Mckeon MD (02/28 1637)      Marked thickening of the stomach wall particularly in the body and antrum most consistent with severe gastritis  Consider endoscopy  Fatty liver  Workstation performed: [id number]         US right upper quadrant   Final Result by Roman Paiz MD (02/28 1440)      Normal       Workstation performed: [id number]                    Procedures  ECG 12 Lead Documentation Only    Date/Time: 2/28/2023 2:10 PM  Performed by: Jennifer Jeter MD  Authorized by: Jennifer Jeter MD     ECG reviewed by me, the ED Provider: yes    Patient location:  ED  Previous ECG:     Previous ECG:  Compared to current  Rate:     ECG rate:  110    ECG rate assessment: tachycardic    Rhythm:     Rhythm: paced    Ectopy:     Ectopy: none    QRS:     QRS axis:  Left    QRS intervals:  Normal  Conduction:     Conduction: abnormal      Abnormal conduction: non-specific intraventricular conduction delay    ST segments:     ST segments:  Normal  T waves:     T waves: normal        Patient with progressive epigastric discomfort, nausea and decreased appetite  Differential diagnosis includes but is not limited to biliary colic, cholecystitis, choledocholithiasis, pancreatitis, ulcer, GERD, colitis less likely ACS or CHF exacerbation  ED Course  ED Course as of 02/28/23 2016 Tue Feb 28, 2023   1543 Still with discomfort upon time of reassessment  Reviewed labs and reassuring ultrasound  With concern for possible bowel/alternate intra-abdominal pathology will obtain CT  Additionally administering GI cocktail and consideration of gastritis or ulcer disease  5 CT reveals stomach wall thickening concerning for advanced gastritis  Covering with high-dose PPI as well as sucralfate and referring to GI for follow-up appointment/likely endoscopy  [id number] I spoke with patient about test results  She shares that years ago she did have an endoscopy as well as H  pylori infection    Demonstrates understanding for treatment plan including minimizing any intake of acidic foods, alcohol and NSAIDs  MDM    Disposition  Final diagnoses:   Gastritis     Time reflects when diagnosis was documented in both MDM as applicable and the Disposition within this note     Time User Action Codes Description Comment    2/28/2023  4:41 PM Henry Chappell A Add [K29 70] Gastritis       ED Disposition     ED Disposition   Discharge    Condition   Stable    Date/Time   Tue Feb 28, 2023  4:41 PM    Comment   Darrell Lentz discharge to home/self care                 Follow-up Information     Follow up With Specialties Details Why Contact Info Additional Scar Ya[facility]ez [facility]  Internal Medicine, Nurse Practitioner, Family Medicine Schedule an appointment as soon as possible for a visit   [address] [facility] [address]  [phone]       [facility] [place] [facility] Schedule an appointment as soon as possible for a visit   [address] [phone] [facility] [place], [address] [place], [place], [phone]          Discharge Medication List as of 2/28/2023  4:43 PM      START taking these medications    Details   pantoprazole (PROTONIX) 40 mg tablet Take 1 tablet (40 mg total) by mouth daily, Starting Tue 2/28/2023, Until Thu 3/30/2023, Normal      sucralfate (CARAFATE) 1 g tablet Take 1 tablet (1 g total) by mouth 4 (four) times a day (before meals and at bedtime), Starting Tue 2/28/2023, Normal         CONTINUE these medications which have NOT CHANGED    Details   ALPRAZolam (XANAX) 0 25 mg tablet Take 1 tablet (0 25 mg total) by mouth 3 (three) times a day as needed for anxiety, Starting Thu 3/3/2022, Normal      carvedilol (COREG) 25 mg tablet TAKE 1 TABLET BY MOUTH TWICE A DAY, Normal      lisinopril (ZESTRIL) 5 mg tablet TAKE 1 TABLET BY MOUTH TWICE A DAY, Normal oxyCODONE-acetaminophen (PERCOCET) 5-325 mg per tablet Take 1 tablet by mouth every 6 (six) hours For use  after surgery, Starting Wed 8/31/2022, Historical Med             No discharge procedures on file      PDMP Review       Value Time User    PDMP Reviewed  Yes 3/3/2022  1:16 PM [facility]          ED Provider  Electronically Signed by           Génesis Cortez MD  02/28/23 2016

## 2023-02-28 NOTE — DISCHARGE INSTRUCTIONS
Begin taking pantoprazole orally twice daily  Additionally take Carafate 4 times daily-prior to each meal and at bedtime for the next 1 week  Schedule follow-up appointment with gastroenterology  Endoscopy may be arranged  Return if you have significant worsening in discomfort, develop fever or other concerns

## 2023-03-01 LAB
ATRIAL RATE: 110 BPM
P AXIS: 57 DEGREES
PR INTERVAL: 122 MS
QRS AXIS: -48 DEGREES
QRSD INTERVAL: 130 MS
QT INTERVAL: 378 MS
QTC INTERVAL: 511 MS
T WAVE AXIS: 75 DEGREES
VENTRICULAR RATE: 110 BPM

## 2023-03-04 ENCOUNTER — APPOINTMENT (OUTPATIENT)
Dept: LAB | Age: 43
End: 2023-03-04

## 2023-03-04 DIAGNOSIS — E78.5 HYPERLIPIDEMIA, UNSPECIFIED HYPERLIPIDEMIA TYPE: ICD-10-CM

## 2023-03-04 DIAGNOSIS — K76.0 FATTY LIVER: ICD-10-CM

## 2023-03-04 LAB
CHOLEST SERPL-MCNC: 215 MG/DL
HDLC SERPL-MCNC: 41 MG/DL
LDLC SERPL CALC-MCNC: 141 MG/DL (ref 0–100)
TRIGL SERPL-MCNC: 165 MG/DL

## 2023-03-07 ENCOUNTER — OFFICE VISIT (OUTPATIENT)
Dept: FAMILY MEDICINE CLINIC | Facility: CLINIC | Age: 43
End: 2023-03-07

## 2023-03-07 VITALS
HEART RATE: 84 BPM | DIASTOLIC BLOOD PRESSURE: 80 MMHG | TEMPERATURE: 97.1 F | RESPIRATION RATE: 16 BRPM | BODY MASS INDEX: 33.9 KG/M2 | HEIGHT: 62 IN | SYSTOLIC BLOOD PRESSURE: 110 MMHG | OXYGEN SATURATION: 98 % | WEIGHT: 184.2 LBS

## 2023-03-07 DIAGNOSIS — F41.9 ANXIETY: ICD-10-CM

## 2023-03-07 DIAGNOSIS — Z00.00 ANNUAL PHYSICAL EXAM: Primary | ICD-10-CM

## 2023-03-07 DIAGNOSIS — I48.0 PAROXYSMAL ATRIAL FIBRILLATION (HCC): ICD-10-CM

## 2023-03-07 DIAGNOSIS — Z23 NEED FOR INFLUENZA VACCINATION: ICD-10-CM

## 2023-03-07 DIAGNOSIS — K29.70 GASTRITIS, PRESENCE OF BLEEDING UNSPECIFIED, UNSPECIFIED CHRONICITY, UNSPECIFIED GASTRITIS TYPE: ICD-10-CM

## 2023-03-07 DIAGNOSIS — Z13.29 SCREENING FOR THYROID DISORDER: ICD-10-CM

## 2023-03-07 DIAGNOSIS — E66.9 OBESITY (BMI 35.0-39.9 WITHOUT COMORBIDITY): ICD-10-CM

## 2023-03-07 DIAGNOSIS — I42.0 DILATED CARDIOMYOPATHY (HCC): ICD-10-CM

## 2023-03-07 DIAGNOSIS — E66.09 CLASS 1 OBESITY DUE TO EXCESS CALORIES WITH SERIOUS COMORBIDITY AND BODY MASS INDEX (BMI) OF 34.0 TO 34.9 IN ADULT: ICD-10-CM

## 2023-03-07 DIAGNOSIS — Z87.898 HISTORY OF MOTION SICKNESS: ICD-10-CM

## 2023-03-07 LAB
A2 MACROGLOB SERPL-MCNC: 102 MG/DL (ref 110–276)
ALT SERPL W P-5'-P-CCNC: 34 IU/L (ref 0–40)
APO A-I SERPL-MCNC: 127 MG/DL (ref 116–209)
AST SERPL W P-5'-P-CCNC: 37 IU/L (ref 0–40)
BILIRUB SERPL-MCNC: 0.1 MG/DL (ref 0–1.2)
CHOLEST SERPL-MCNC: 230 MG/DL (ref 100–199)
FIBROSIS SCORING:: ABNORMAL
FIBROSIS STAGE SERPL QL: ABNORMAL
GGT SERPL-CCNC: 69 IU/L (ref 0–60)
GLUCOSE SERPL-MCNC: 122 MG/DL (ref 70–99)
HAPTOGLOB SERPL-MCNC: 423 MG/DL (ref 42–296)
LABORATORY COMMENT REPORT: ABNORMAL
LIVER FIBR SCORE SERPL CALC.FIBROSURE: 0.01 (ref 0–0.21)
NECROINFLAMMATORY ACT GRADE SERPL QL: ABNORMAL
NECROINFLAMMATORY ACT SCORE SERPL: 0.5
SERVICE CMNT-IMP: ABNORMAL
SL AMB INTERPRETATION: ABNORMAL
SL AMB NASH SCORING: ABNORMAL
SL AMB STEATOSIS GRADE: ABNORMAL
SL AMB STEATOSIS SCORE: 0.89 (ref 0–0.3)
STEATOSIS GRADING: ABNORMAL
TRIGL SERPL-MCNC: 189 MG/DL (ref 0–149)

## 2023-03-07 RX ORDER — ALPRAZOLAM 0.25 MG/1
0.25 TABLET ORAL 3 TIMES DAILY PRN
Qty: 30 TABLET | Refills: 0 | Status: SHIPPED | OUTPATIENT
Start: 2023-03-07 | End: 2023-03-14 | Stop reason: SDUPTHER

## 2023-03-07 RX ORDER — SCOLOPAMINE TRANSDERMAL SYSTEM 1 MG/1
1 PATCH, EXTENDED RELEASE TRANSDERMAL
Qty: 4 PATCH | Refills: 0 | Status: SHIPPED | OUTPATIENT
Start: 2023-03-07

## 2023-03-07 NOTE — PROGRESS NOTES
[address] [facility] [address] [facility] [address]    NAME: Ruthie Starr  AGE: 38 y o  SEX: female  : 1980     DATE: 3/7/2023     Assessment and Plan:     Problem List Items Addressed This Visit        Digestive    Gastritis     Working with GI, scheduled for egd next week for evaluation, taking carafate, pantoprazole  Continue bland diet  Cardiovascular and Mediastinum    Cardiomyopathy [facility])     Ongoing ongoing f/u with cardiology, continue carvedilol, lisinopril  BP well controlled  Paroxysmal atrial fibrillation ([address] )     Pacemaker in place, denies cp, palpitations  Ongoing f/u with cardiology, continue carvedilol, lisinopril  Other    Annual physical exam - Primary     Unremarkable exam of adult female  Scheduled for mammo  Flu shot administered today  Continue with annual gyn, dental, eye exams  Next year to start colon cancer screening  Anxiety     Stable with prn alprazolam, pdmp reviewed and no hx of overuse/abuse  Refill sent  Relevant Medications    ALPRAZolam (XANAX) 0 25 mg tablet    Class 1 obesity due to excess calories with serious comorbidity and body mass index (BMI) of 34 0 to 34 9 in adult     Reinforce healthy diet, regular exercise  History of motion sickness     rx sent for scopolamine patch in advance of upcoming cruise         Relevant Medications    scopolamine (TRANSDERM-SCOP) 1 mg/3 days TD 72 hr patch    RESOLVED: Obesity (BMI 35 0-39 9 without comorbidity)   Other Visit Diagnoses     Need for influenza vaccination        Relevant Orders    influenza vaccine, quadrivalent, recombinant, PF, 0 5 mL, for patients 18 yr+ (FLUBLOK) (Completed)    Screening for thyroid disorder        Relevant Orders    TSH, 3rd generation with Free T4 reflex          Immunizations and preventive care screenings were discussed with patient today   Appropriate education was printed on patient's after visit summary  Counseling:  Dental Health: discussed importance of regular tooth brushing, flossing, and dental visits  Injury prevention: discussed safety/seat belts, safety helmets, smoke detectors, carbon dioxide detectors, and smoking near bedding or upholstery  Exercise: the importance of regular exercise/physical activity was discussed  Recommend exercise 3-5 times per week for at least 30 minutes  BMI Counseling: Body mass index is 34 11 kg/m²  The BMI is above normal  Nutrition recommendations include decreasing portion sizes, encouraging healthy choices of fruits and vegetables, consuming healthier snacks, moderation in carbohydrate intake, increasing intake of lean protein, reducing intake of saturated and trans fat and reducing intake of cholesterol  Exercise recommendations include moderate physical activity 150 minutes/week, exercising 3-5 times per week and strength training exercises  Rationale for BMI follow-up plan is due to patient being overweight or obese  Return in about 1 year (around 3/7/2024) for Annual physical      Chief Complaint:     Chief Complaint   Patient presents with   • Physical Exam     Patient is here for her annual wellness      History of Present Illness:     Adult Annual Physical   Patient here for a comprehensive physical exam  The patient reports problems - working with GI regarding severe gastritis  Diet and Physical Activity  Diet/Nutrition: bland diet currently  Exercise: no formal exercise  Depression Screening  PHQ-2/9 Depression Screening    Little interest or pleasure in doing things: 0 - not at all  Feeling down, depressed, or hopeless: 0 - not at all       General Health  Sleep: sleeps well and gets more than 8 hours of sleep on average  Hearing: normal - bilateral   Vision: no vision problems, goes for regular eye exams, most recent eye exam <1 year ago and wears glasses and contacts     Dental: regular dental visits, brushes teeth twice daily and flosses teeth occasionally  /GYN Health  Patient is: premenopausal  Last menstrual period: End of February  Contraceptive method: tubal ligation  Review of Systems:     Review of Systems   Constitutional: Positive for appetite change and diaphoresis (night sweats occasionally)  Negative for activity change, chills, fatigue, fever and unexpected weight change  HENT: Negative for hearing loss  Eyes: Negative for visual disturbance  Respiratory: Negative for chest tightness and shortness of breath  Cardiovascular: Negative for chest pain, palpitations and leg swelling  Gastrointestinal: Positive for abdominal distention, abdominal pain, blood in stool, diarrhea, nausea and vomiting  Negative for constipation  Genitourinary: Negative for difficulty urinating, dysuria, frequency and menstrual problem  Musculoskeletal: Negative for arthralgias and myalgias  Allergic/Immunologic: Negative for environmental allergies  Neurological: Negative for dizziness, syncope, weakness, light-headedness, numbness and headaches  Psychiatric/Behavioral: Negative for dysphoric mood, self-injury, sleep disturbance and suicidal ideas  The patient is nervous/anxious (high stress)         Past Medical History:     Past Medical History:   Diagnosis Date   • Allergic rhinitis    • Anxiety    • Atrial fibrillation ([address] )     paroxysmal   • Bunion 1985   • Candidal vulvovaginitis    • Cardiomyopathy ([address] )    • Chest pain    • CHF (congestive heart failure) (HCC)     chronic systolic   • Coronary artery disease    • Hammertoe of right foot    • Herpes simplex    • Irregular heart beat    • LBBB (left bundle branch block)    • Normal delivery     2005 son, 2008 daughter   • Pacemaker       Past Surgical History:     Past Surgical History:   Procedure Laterality Date   • BUNIONECTOMY Bilateral    • BUNIONECTOMY Right 9/29/2022    Procedure: Steve Brown;  Surgeon: Suzanne South TITI Montemayor;  [facility];  Service: Podiatry   • CARDIAC DEFIBRILLATOR PLACEMENT     • [place] EXCISION EXCESSIVE SKIN & SUBQ TISSUE ABDOMEN N/A 05/29/2019    Procedure: CIRCUMFERENTIAL ABDOMINOPLASTY;  Surgeon: Lin Macdonald MD;  Location: [address] [place] [address] MAIN OR;  Service: Plastics   • RECESSION GASTROC ENDOSCOPIC Right 9/29/2022    Procedure: RECESSION GASTROC ENDOSCOPIC;  Surgeon: Patti Roper DPM;  Location[facility];  Service: Podiatry   • TOE OSTEOTOMY Right 9/29/2022    Procedure: 2ND HAMMERTOE REPAIR;  Surgeon: Patti Roper DPM;  [facility];  Service: Podiatry   • TUBAL LIGATION  2009      Social History:     Social History     Socioeconomic History   • Marital status: /Civil Union     Spouse name: None   • Number of children: None   • Years of education: None   • Highest education level: None   Occupational History   • 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   Tobacco Use   • Smoking status: Never   • Smokeless tobacco: Never   • Tobacco comments:     history of second hand smoke exposure as a child   Vaping Use   • Vaping Use: Never used   Substance and Sexual Activity   • Alcohol use:  Yes     Alcohol/week: 2 0 standard drinks     Types: 2 Glasses of wine per week     Comment: social   • Drug use: No   • Sexual activity: Yes     Partners: Male     Birth control/protection: Female Sterilization   Other Topics Concern   • None   Social History Narrative    Lives with her  and 2 children    Works full time     Exercises 4-5 times per week    Reports a healthy/balanced diet     Social Determinants of Health     Financial Resource Strain: Not on file   Food Insecurity: Not on file   Transportation Needs: Not on file   Physical Activity: Not on file   Stress: Not on file   Social Connections: Not on file   Intimate Partner Violence: Not on file   Housing Stability: Not on file      Family History:     Family History   Problem Relation Age of Onset   • Diabetes Father    • Hypertension Father    • Heart disease Father    • Hyperlipidemia Father    • Hypertension Mother    • No Known Problems Brother    • No Known Problems Brother    • No Known Problems Son    • No Known Problems Daughter    • No Known Problems Maternal Grandmother    • No Known Problems Maternal Grandfather    • No Known Problems Paternal Grandmother    • No Known Problems Paternal Grandfather    • No Known Problems Maternal Aunt    • No Known Problems Paternal Aunt    • No Known Problems Paternal Aunt    • No Known Problems Paternal Aunt       Current Medications:     Current Outpatient Medications   Medication Sig Dispense Refill   • ALPRAZolam (XANAX) 0 25 mg tablet Take 1 tablet (0 25 mg total) by mouth 3 (three) times a day as needed for anxiety 30 tablet 0   • carvedilol (COREG) 25 mg tablet TAKE 1 TABLET BY MOUTH TWICE A  tablet 4   • lisinopril (ZESTRIL) 5 mg tablet TAKE 1 TABLET BY MOUTH TWICE A  tablet 2   • pantoprazole (PROTONIX) 40 mg tablet Take 1 tablet (40 mg total) by mouth daily 60 tablet 0   • scopolamine (TRANSDERM-SCOP) 1 mg/3 days TD 72 hr patch Place 1 patch on the skin over 72 hours every third day 4 patch 0   • sucralfate (CARAFATE) 1 g tablet Take 1 tablet (1 g total) by mouth 4 (four) times a day (before meals and at bedtime) 28 tablet 0     No current facility-administered medications for this visit  Allergies:     No Known Allergies   Physical Exam:     /80   Pulse 84   Temp (!) 97 1 °F (36 2 °C)   Resp 16   Ht 5' 1 61" (1 565 m)   Wt 83 6 kg (184 lb 3 2 oz)   SpO2 98%   BMI 34 11 kg/m²     Physical Exam  Vitals reviewed  Constitutional:       General: She is awake  She is not in acute distress  Appearance: Normal appearance  She is well-developed and well-groomed  She is not ill-appearing  HENT:      Head: Normocephalic        Right Ear: Hearing, tympanic membrane, ear canal and external ear normal       Left Ear: Hearing, tympanic membrane, ear canal and external ear normal       Nose: Nose normal       Mouth/Throat:      Pharynx: Uvula midline  Eyes:      General: Lids are normal       Conjunctiva/sclera: Conjunctivae normal       Pupils: Pupils are equal, round, and reactive to light  Neck:      Thyroid: Thyromegaly present  No thyroid mass  Vascular: Normal carotid pulses  No carotid bruit or JVD  Cardiovascular:      Rate and Rhythm: Normal rate and regular rhythm  Pulses: Normal pulses  Heart sounds: Normal heart sounds, S1 normal and S2 normal  No murmur heard  Pulmonary:      Effort: Pulmonary effort is normal       Breath sounds: Normal breath sounds  Abdominal:      General: Abdomen is flat  Bowel sounds are normal       Palpations: Abdomen is soft  Tenderness: There is generalized abdominal tenderness  Musculoskeletal:         General: Normal range of motion  Cervical back: Full passive range of motion without pain  Right lower leg: No edema  Left lower leg: No edema  Lymphadenopathy:      Head:      Right side of head: No submental, submandibular, tonsillar, preauricular, posterior auricular or occipital adenopathy  Left side of head: No submental, submandibular, tonsillar, preauricular, posterior auricular or occipital adenopathy  Cervical: No cervical adenopathy  Skin:     General: Skin is warm and dry  Neurological:      Mental Status: She is alert and oriented to person, place, and time  Sensory: No sensory deficit  Motor: Motor function is intact  Deep Tendon Reflexes:      Reflex Scores:       Patellar reflexes are 0 on the right side and 0 on the left side  Psychiatric:         Attention and Perception: Attention normal          Mood and Affect: Mood normal          Speech: Speech normal          Behavior: Behavior normal  Behavior is cooperative  Thought Content:  Thought content normal          Cognition and Memory: Cognition normal          Judgment: Judgment normal           Concepcion Gilmore, [address] [facility],[address]

## 2023-03-12 ENCOUNTER — APPOINTMENT (OUTPATIENT)
Dept: LAB | Age: 43
End: 2023-03-12

## 2023-03-12 DIAGNOSIS — Z13.29 SCREENING FOR THYROID DISORDER: ICD-10-CM

## 2023-03-12 LAB — TSH SERPL DL<=0.05 MIU/L-ACNC: 2.4 UIU/ML (ref 0.45–4.5)

## 2023-03-13 ENCOUNTER — TELEPHONE (OUTPATIENT)
Dept: OTHER | Facility: OTHER | Age: 43
End: 2023-03-13

## 2023-03-13 DIAGNOSIS — F41.9 ANXIETY: ICD-10-CM

## 2023-03-13 NOTE — TELEPHONE ENCOUNTER
Patient stated that pharmacy is on back order for some time now of the Alprazolam (Xanax) for the  25 mg tablet they do not know when they will get it  They've been recommending providers to write script for   5 and take 1/2 of the tablet  Please look into this and send new script over to [phone]  [phone]  Please call patient back to advise if sent over

## 2023-03-14 DIAGNOSIS — F41.9 ANXIETY: ICD-10-CM

## 2023-03-14 RX ORDER — ALPRAZOLAM 0.5 MG/1
0.25 TABLET ORAL 3 TIMES DAILY PRN
Qty: 20 TABLET | Refills: 0 | Status: SHIPPED | OUTPATIENT
Start: 2023-03-14

## 2023-03-15 ENCOUNTER — REMOTE DEVICE CLINIC VISIT (OUTPATIENT)
Dept: CARDIOLOGY CLINIC | Facility: CLINIC | Age: 43
End: 2023-03-15

## 2023-03-15 DIAGNOSIS — Z95.810 AICD (AUTOMATIC CARDIOVERTER/DEFIBRILLATOR) PRESENT: Primary | ICD-10-CM

## 2023-03-15 NOTE — PROGRESS NOTES
Results for orders placed or performed in visit on 03/15/23   Cardiac EP device report    Narrative    MDT/CRT-D (DDD MODE)/ NOT MRI CONDITIONAL  CARELINK TRANSMISSION: BATTERY VOLTAGE NEARING DANTE (10 MTHS)  WILL SCHEDULE MONTHLY BATTERY CHECKS  AP <0 1% BVP 99 8% ( 98 5% + VSRP 1 3%) ALL AVAILABLE LEAD PARAMETERS WITHIN NORMAL LIMITS  NO SIGNIFICANT HIGH RATE EPISODES  [facility]  OPTI-VOL WITHIN NORMAL LIMITS  NORMAL DEVICE FUNCTION   AM/GV

## 2023-03-17 ENCOUNTER — HOSPITAL ENCOUNTER (OUTPATIENT)
Dept: GASTROENTEROLOGY | Facility: HOSPITAL | Age: 43
Setting detail: OUTPATIENT SURGERY
End: 2023-03-17
Attending: INTERNAL MEDICINE

## 2023-03-17 ENCOUNTER — HOSPITAL ENCOUNTER (OUTPATIENT)
Dept: RADIOLOGY | Age: 43
Discharge: HOME | End: 2023-03-17

## 2023-03-17 ENCOUNTER — ANESTHESIA EVENT (OUTPATIENT)
Dept: GASTROENTEROLOGY | Facility: HOSPITAL | Age: 43
End: 2023-03-17

## 2023-03-17 ENCOUNTER — ANESTHESIA (OUTPATIENT)
Dept: GASTROENTEROLOGY | Facility: HOSPITAL | Age: 43
End: 2023-03-17

## 2023-03-17 VITALS
RESPIRATION RATE: 16 BRPM | OXYGEN SATURATION: 99 % | WEIGHT: 184 LBS | HEART RATE: 67 BPM | TEMPERATURE: 99.3 F | BODY MASS INDEX: 34.09 KG/M2 | SYSTOLIC BLOOD PRESSURE: 100 MMHG | DIASTOLIC BLOOD PRESSURE: 59 MMHG

## 2023-03-17 VITALS — BODY MASS INDEX: 33.86 KG/M2 | HEIGHT: 62 IN | WEIGHT: 184 LBS

## 2023-03-17 DIAGNOSIS — Z12.31 SCREENING MAMMOGRAM, ENCOUNTER FOR: ICD-10-CM

## 2023-03-17 DIAGNOSIS — R10.9 UNSPECIFIED ABDOMINAL PAIN: ICD-10-CM

## 2023-03-17 RX ORDER — LIDOCAINE HYDROCHLORIDE 10 MG/ML
INJECTION, SOLUTION EPIDURAL; INFILTRATION; INTRACAUDAL; PERINEURAL AS NEEDED
Status: DISCONTINUED | OUTPATIENT
Start: 2023-03-17 | End: 2023-03-17

## 2023-03-17 RX ORDER — PROPOFOL 10 MG/ML
INJECTION, EMULSION INTRAVENOUS AS NEEDED
Status: DISCONTINUED | OUTPATIENT
Start: 2023-03-17 | End: 2023-03-17

## 2023-03-17 RX ORDER — SODIUM CHLORIDE 9 MG/ML
INJECTION, SOLUTION INTRAVENOUS CONTINUOUS PRN
Status: DISCONTINUED | OUTPATIENT
Start: 2023-03-17 | End: 2023-03-17

## 2023-03-17 RX ADMIN — SODIUM CHLORIDE: 9 INJECTION, SOLUTION INTRAVENOUS at 13:00

## 2023-03-17 RX ADMIN — PROPOFOL 40 MG: 10 INJECTION, EMULSION INTRAVENOUS at 13:08

## 2023-03-17 RX ADMIN — PROPOFOL 140 MG: 10 INJECTION, EMULSION INTRAVENOUS at 13:04

## 2023-03-17 RX ADMIN — LIDOCAINE HYDROCHLORIDE 50 MG: 10 INJECTION, SOLUTION EPIDURAL; INFILTRATION; INTRACAUDAL; PERINEURAL at 13:04

## 2023-03-17 NOTE — ANESTHESIA PREPROCEDURE EVALUATION
Procedure:  EGD    Relevant Problems   CARDIO   (+) Chest pain on breathing   (+) Left bundle-branch block   (+) Mitral regurgitation   (+) Paroxysmal atrial fibrillation (HCC)      NEURO/PSYCH   (+) Anxiety   (+) History of motion sickness        Physical Exam    Airway    Mallampati score: I  TM Distance: >3 FB  Neck ROM: full     Dental   No notable dental hx     Cardiovascular      Pulmonary      Other Findings        Anesthesia Plan  ASA Score- 2     Anesthesia Type- IV sedation with anesthesia with ASA Monitors  Additional Monitors:   Airway Plan:           Plan Factors-Exercise tolerance (METS): >4 METS  Chart reviewed  Existing labs reviewed  Patient summary reviewed  Induction- intravenous  Postoperative Plan-     Informed Consent- Anesthetic plan and risks discussed with patient  I personally reviewed this patient with the CRNA  Discussed and agreed on the Anesthesia Plan with the CRNA  Darlene Bloom

## 2023-03-17 NOTE — ANESTHESIA POSTPROCEDURE EVALUATION
Post-Op Assessment Note    CV Status:  Stable    Pain management: adequate     Mental Status:  Alert and awake   Hydration Status:  Euvolemic   PONV Controlled:  Controlled   Airway Patency:  Patent      Post Op Vitals Reviewed: Yes            No notable events documented      /57 (03/17/23 1318)    Temp 99 3 °F (37 4 °C) (03/17/23 1318)    Pulse 83 (03/17/23 1318)   Resp 16 (03/17/23 1318)    SpO2 99 % (03/17/23 1318)

## 2023-03-17 NOTE — H&P
History and Physical - [facility]  Harsh Edwards 38 y o  female MRN: [id number]                  HPI: Harsh Edwards is a 38y o  year old female who presents for EGD  Indications for the procedure: Upper abdominal pain, abnormal CAT scan showing thickened gastric wall      REVIEW OF SYSTEMS: Per the HPI, and otherwise unremarkable      Historical Information   Past Medical History:   Diagnosis Date   • Allergic rhinitis    • Anxiety    • Atrial fibrillation (HCC)     paroxysmal   • Bunion 1985   • Candidal vulvovaginitis    • Cardiomyopathy ([address] )    • Chest pain    • CHF (congestive heart failure) (HCC)     chronic systolic   • Coronary artery disease    • Hammertoe of right foot    • Herpes simplex    • Irregular heart beat    • LBBB (left bundle branch block)    • Normal delivery     2005 son, 2008 daughter   • Pacemaker      Past Surgical History:   Procedure Laterality Date   • BUNIONECTOMY Bilateral    • BUNIONECTOMY Right 9/29/2022    Procedure: Kareen Xiong;  Surgeon: Theresa Low DPM;  [facility];  Service: Podiatry   • CARDIAC DEFIBRILLATOR PLACEMENT     • [facility] ABDOMEN N/A 05/29/2019    Procedure: CIRCUMFERENTIAL ABDOMINOPLASTY;  Surgeon: Harpreet Mitchell MD;  Location: [address] [place] [address] MAIN OR;  Service: Plastics   • RECESSION GASTROC ENDOSCOPIC Right 9/29/2022    Procedure: RECESSION GASTROC ENDOSCOPIC;  Surgeon: Theresa Low DPM;  [facility];  Service: Podiatry   • TOE OSTEOTOMY Right 9/29/2022    Procedure: 2ND HAMMERTOE REPAIR;  Surgeon: Nima Montemayor DPM;  [facility];  Service: Podiatry   • TUBAL LIGATION  2009     Social History   Social History     Substance and Sexual Activity   Alcohol Use Yes   • Alcohol/week: 2 0 standard drinks   • Types: 2 Glasses of wine per week    Comment: social     Social History     Substance and Sexual Activity   Drug Use No     Social History Tobacco Use   Smoking Status Never   Smokeless Tobacco Never   Tobacco Comments    history of second hand smoke exposure as a child     Family History   Problem Relation Age of Onset   • Diabetes Father    • Hypertension Father    • Heart disease Father    • Hyperlipidemia Father    • Hypertension Mother    • No Known Problems Brother    • No Known Problems Brother    • No Known Problems Son    • No Known Problems Daughter    • No Known Problems Maternal Grandmother    • No Known Problems Maternal Grandfather    • No Known Problems Paternal Grandmother    • No Known Problems Paternal Grandfather    • No Known Problems Maternal Aunt    • No Known Problems Paternal Aunt    • No Known Problems Paternal Aunt    • No Known Problems Paternal Aunt        Meds/Allergies       Current Outpatient Medications:   •  carvedilol (COREG) 25 mg tablet  •  lisinopril (ZESTRIL) 5 mg tablet  •  pantoprazole (PROTONIX) 40 mg tablet  •  ALPRAZolam (XANAX) 0 5 mg tablet  •  scopolamine (TRANSDERM-SCOP) 1 mg/3 days TD 72 hr patch    No Known Allergies    Objective     /61   Pulse 80   Resp 16   Wt 83 5 kg (184 lb)   LMP 02/27/2023 (Within Days) Comment: tubal ligation  SpO2 98%   BMI 34 09 kg/m²       PHYSICAL EXAM    Gen: NAD  Head: NCAT  CV: RRR  CHEST: Clear  ABD: soft, NT/ND  EXT: no edema      ASSESSMENT/PLAN:  This is a 38y o  year old female here for EGD, and she is stable and optimized for her procedure

## 2023-05-01 ENCOUNTER — RA CDI HCC (OUTPATIENT)
Dept: OTHER | Facility: HOSPITAL | Age: 43
End: 2023-05-01

## 2023-05-01 NOTE — PROGRESS NOTES
[facility]  coding opportunities       Chart reviewed, no opportunity found: CHART REVIEWED, NO OPPORTUNITY FOUND        Patients Insurance        Commercial Insurance: [employer]

## 2023-05-08 ENCOUNTER — OFFICE VISIT (OUTPATIENT)
Dept: CARDIOLOGY CLINIC | Facility: CLINIC | Age: 43
End: 2023-05-08

## 2023-05-08 VITALS
DIASTOLIC BLOOD PRESSURE: 60 MMHG | HEART RATE: 83 BPM | OXYGEN SATURATION: 97 % | SYSTOLIC BLOOD PRESSURE: 98 MMHG | BODY MASS INDEX: 32.42 KG/M2 | WEIGHT: 175 LBS

## 2023-05-08 DIAGNOSIS — I42.0 DILATED CARDIOMYOPATHY (HCC): ICD-10-CM

## 2023-05-08 DIAGNOSIS — I34.0 NONRHEUMATIC MITRAL VALVE REGURGITATION: ICD-10-CM

## 2023-05-08 DIAGNOSIS — I48.0 PAROXYSMAL ATRIAL FIBRILLATION (HCC): Primary | ICD-10-CM

## 2023-05-08 DIAGNOSIS — I44.7 LEFT BUNDLE-BRANCH BLOCK: ICD-10-CM

## 2023-05-08 NOTE — PROGRESS NOTES
Cardiology Follow Up    Tara Marquez  1980  [id number]  [facility]  [address]  [address]  [place] [id number] [phone]    1  Paroxysmal atrial fibrillation (HCC)        2  Nonrheumatic mitral valve regurgitation        3  Left bundle-branch block        4  Dilated cardiomyopathy ([facility] [address] )              Discussion/Summary: All of her assessed cardiac problems are stable  I have reviewed her medications and made no changes  No cardiac testing is ordered  RTO 1 year  Interval History: She has not had any cardiac problems since her last office visit  She is active and denies CP, SOB, palpitations  Her weight is down from 181 to 175 lbs  ECHO 2/2022 - EF 50%  BP 98/60  She has a LBBB, non ischemic CM  EF is much better from 14 years ago and has been stable  His biV ICD is functioning normally  The battery is receiving EOL        Patient Active Problem List   Diagnosis   • Anxiety   • Cardiomyopathy [facility])   • Left bundle-branch block   • Menorrhagia with regular cycle   • Mitral regurgitation   • Chest pain on breathing   • Screening for heart disease   • Annual physical exam   • Lipodystrophy   • Class 1 obesity due to excess calories with serious comorbidity and body mass index (BMI) of 34 0 to 34 9 in adult   • Paroxysmal atrial fibrillation (HCC)   • Night sweats   • Generalized abdominal pain   • Pre-op examination   • Pacemaker   • History of motion sickness   • Gastritis     Past Medical History:   Diagnosis Date   • Allergic rhinitis    • Anxiety    • Atrial fibrillation (HCC)     paroxysmal   • Bunion 1985   • Candidal vulvovaginitis    • Cardiomyopathy ([facility] )    • Chest pain    • CHF (congestive heart failure) (HCC)     chronic systolic   • Coronary artery disease    • Hammertoe of right foot    • Herpes simplex    • Irregular heart beat    • LBBB (left bundle branch block)    • Normal delivery 2005 son, 2008 daughter   • Pacemaker      Social History     Socioeconomic History   • Marital status: /Civil Union     Spouse name: Not on file   • Number of children: Not on file   • Years of education: Not on file   • Highest education level: Not on file   Occupational History   • 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   Tobacco Use   • Smoking status: Never   • Smokeless tobacco: Never   • Tobacco comments:     history of second hand smoke exposure as a child   Vaping Use   • Vaping Use: Never used   Substance and Sexual Activity   • Alcohol use:  Yes     Alcohol/week: 2 0 standard drinks     Types: 2 Glasses of wine per week     Comment: social   • Drug use: No   • Sexual activity: Yes     Partners: Male     Birth control/protection: Female Sterilization   Other Topics Concern   • Not on file   Social History Narrative    Lives with her  and 2 children    Works full time     Exercises 4-5 times per week    Reports a healthy/balanced diet     Social Determinants of Health     Financial Resource Strain: Not on file   Food Insecurity: Not on file   Transportation Needs: Not on file   Physical Activity: Not on file   Stress: Not on file   Social Connections: Not on file   Intimate Partner Violence: Not on file   Housing Stability: Not on file      Family History   Problem Relation Age of Onset   • Diabetes Father    • Hypertension Father    • Heart disease Father    • Hyperlipidemia Father    • Hypertension Mother    • No Known Problems Brother    • No Known Problems Brother    • No Known Problems Son    • No Known Problems Daughter    • No Known Problems Maternal Grandmother    • No Known Problems Maternal Grandfather    • No Known Problems Paternal Grandmother    • No Known Problems Paternal Grandfather    • No Known Problems Maternal Aunt    • No Known Problems Paternal Aunt    • No Known Problems Paternal Aunt    • No Known Problems Paternal Aunt      Past Surgical History:   Procedure Laterality Date   • BUNIONECTOMY Bilateral    • BUNIONECTOMY Right 9/29/2022    Procedure: LAPIDUS BUNIONECTOMY;  Surgeon: Marta Montemayor DPM;  Location: [facility];  Service: Podiatry   • CARDIAC DEFIBRILLATOR PLACEMENT     • IN EXCISION EXCESSIVE SKIN & SUBQ TISSUE ABDOMEN N/A 05/29/2019    Procedure: CIRCUMFERENTIAL ABDOMINOPLASTY;  Surgeon: Petey Beal MD;  Location: [facility] MAIN OR;  Service: Plastics   • RECESSION GASTROC ENDOSCOPIC Right 9/29/2022    Procedure: RECESSION GASTROC ENDOSCOPIC;  Surgeon: Marta Montemayor DPM;  Location: [facility];  Service: Podiatry   • TOE OSTEOTOMY Right 9/29/2022    Procedure: 2ND HAMMERTOE REPAIR;  Surgeon: Marta Montemayor DPM;  Location: [facility];  Service: Podiatry   • TUBAL LIGATION  2009       Current Outpatient Medications:   •  ALPRAZolam (XANAX) 0 5 mg tablet, Take 0 5 tablets (0 25 mg total) by mouth 3 (three) times a day as needed for anxiety, Disp: 20 tablet, Rfl: 0  •  carvedilol (COREG) 25 mg tablet, TAKE 1 TABLET BY MOUTH TWICE A DAY, Disp: 180 tablet, Rfl: 4  •  lisinopril (ZESTRIL) 5 mg tablet, TAKE 1 TABLET BY MOUTH TWICE A DAY, Disp: 180 tablet, Rfl: 2  •  scopolamine (TRANSDERM-SCOP) 1 mg/3 days TD 72 hr patch, Place 1 patch on the skin over 72 hours every third day, Disp: 4 patch, Rfl: 0  •  pantoprazole (PROTONIX) 40 mg tablet, Take 1 tablet (40 mg total) by mouth daily (Patient not taking: Reported on 5/8/2023), Disp: 60 tablet, Rfl: 0  No Known Allergies  Vitals:    05/08/23 0834   BP: 98/60   BP Location: Left arm   Patient Position: Sitting   Cuff Size: Adult   Pulse: 83   SpO2: 97%   Weight: 79 4 kg (175 lb)     Weight (last 2 days)     Date/Time Weight    05/08/23 0834 79 4 (175)         Blood pressure 98/60, pulse 83, weight 79 4 kg (175 lb), SpO2 97 %, not currently breastfeeding , Body mass index is 32 42 kg/m²      Labs:  Hospital Outpatient Visit on 03/17/2023   Component Date Value   • Case Report 03/17/2023 Value:Surgical Pathology Report                         Case: [id number]                                   Authorizing Provider:  Natalie Caba MD    Collected:           2023 1309              Ordering Location:     [address] [place] [address] Road      [facility]            [facility] [address] [facility]                                                           Pathologist:           Ulises Koenig MD                                                       Specimen:    Stomach, R/O h pylori                                                                     • Final Diagnosis 2023                      Value: This result contains rich text formatting which cannot be displayed here  • Note 2023                      Value: This result contains rich text formatting which cannot be displayed here  • Additional Information 2023                      Value: This result contains rich text formatting which cannot be displayed here  • Gross Description 2023                      Value: This result contains rich text formatting which cannot be displayed here     Appointment on 2023   Component Date Value   • TSH 3RD GENERATON 2023 2 400    Appointment on 2023   Component Date Value   • Cholesterol 2023 215 (H)    • Triglycerides 2023 165 (H)    • HDL, Direct 2023 41 (L)    • LDL Calculated 2023 141 (H)    • Glucose, Random 2023 122 (H)    • Cholesterol, Total 2023 230 (H)    • BILIRUBIN, TOTAL 2023 0 1    • AST (SGOT) P5P 2023 37    • Triglycerides 2023 189 (H)    • ALT (SGPT) 2023 34    • Haptoglobin 2023 423 (H)    • GGT 2023 69 (H)    • Apolipoprotein A-1 2023 127    • Alpha 2-Macroglobulins, * 2023 102 (L)    • Comments 2023 Comment    • Fibrosis Scorin2023 Comment    • Necroinflammat Activity * 2023 Comment    • Interpretation: 03/04/2023 Comment    • GONZALES Scoring 03/04/2023 Comment    • Fibrosis Score 03/04/2023 0 01    • Fibrosis Stage 03/04/2023 Comment    • Steatosis Score 03/04/2023 0 89 (H)    • Steatosis Grade 03/04/2023 Comment    • Height 03/04/2023 63    • Weight 03/04/2023 193    • Necroinflammat Activity * 03/04/2023 0 50 (H)    • Limitations: 03/04/2023 Comment    • Steatosis Grading 03/04/2023 Comment    Admission on 02/28/2023, Discharged on 02/28/2023   Component Date Value   • Ventricular Rate 02/28/2023 110    • Atrial Rate 02/28/2023 110    • MA Interval 02/28/2023 122    • QRSD Interval 02/28/2023 130    • QT Interval 02/28/2023 378    • QTC Interval 02/28/2023 511    • P Axis 02/28/2023 57    • QRS Axis 02/28/2023 -48    • T Wave Axis 02/28/2023 75    • WBC 02/28/2023 9 14    • RBC 02/28/2023 4 17    • Hemoglobin 02/28/2023 11 6    • Hematocrit 02/28/2023 35 4    • MCV 02/28/2023 85    • MCH 02/28/2023 27 8    • MCHC 02/28/2023 32 8    • RDW 02/28/2023 12 9    • MPV 02/28/2023 10 4    • Platelets 74/62/4121 210    • nRBC 02/28/2023 0    • Neutrophils Relative 02/28/2023 68    • Immat GRANS % 02/28/2023 0    • Lymphocytes Relative 02/28/2023 18    • Monocytes Relative 02/28/2023 12    • Eosinophils Relative 02/28/2023 2    • Basophils Relative 02/28/2023 0    • Neutrophils Absolute 02/28/2023 6 15    • Immature Grans Absolute 02/28/2023 0 04    • Lymphocytes Absolute 02/28/2023 1 60    • Monocytes Absolute 02/28/2023 1 12    • Eosinophils Absolute 02/28/2023 0 19    • Basophils Absolute 02/28/2023 0 04    • Sodium 02/28/2023 133 (L)    • Potassium 02/28/2023 3 7    • Chloride 02/28/2023 101    • CO2 02/28/2023 26    • ANION GAP 02/28/2023 6    • BUN 02/28/2023 8    • Creatinine 02/28/2023 0 57 (L)    • Glucose 02/28/2023 114    • Calcium 02/28/2023 9 0    • AST 02/28/2023 11 (L)    • ALT 02/28/2023 16    • Alkaline Phosphatase 02/28/2023 67    • Total Protein 02/28/2023 7 0    • Albumin 02/28/2023 4 0    • Total Bilirubin 02/28/2023 0 52    • eGFR 02/28/2023 113    • Lipase 02/28/2023 23    • LACTIC ACID 02/28/2023 1 1    • hs TnI 0hr 02/28/2023 3    • Color, UA 02/28/2023 Light Yellow    • Clarity, UA 02/28/2023 Clear    • Specific [place], UA 02/28/2023 1 015    • pH, UA 02/28/2023 7 0    • Leukocytes, UA 02/28/2023 Small (A)    • Nitrite, UA 02/28/2023 Negative    • Protein, UA 02/28/2023 Negative    • Glucose, UA 02/28/2023 Negative    • Ketones, UA 02/28/2023 Negative    • Urobilinogen, UA 02/28/2023 <2 0    • Bilirubin, UA 02/28/2023 Negative    • Occult Blood, UA 02/28/2023 Negative    • EXT Preg Test, Ur 02/28/2023 Negative    • Control 02/28/2023 Valid    • RBC, UA 02/28/2023 1-2    • WBC, UA 02/28/2023 4-10 (A)    • Epithelial Cells 02/28/2023 Occasional    • Bacteria, UA 02/28/2023 None Seen    • MUCUS THREADS 02/28/2023 Occasional (A)      Imaging: Cardiac EP device report    Result Date: 4/14/2023  Narrative: MDT/CRT-D (DDD MODE)/ NOT MRI CONDITIONAL [employer]EL[employer] TRANSMISSION: BATTERY VOLTAGE NEARING DANTE-9 MONS  WILL SCHEDULE MONTHLY BATTERY CHECKS  AP 0%  98% VSRP 1%  ALL AVAILABLE LEAD PARAMETERS WITHIN NORMAL LIMITS  NO SIGNIFICANT HIGH RATE EPISODES  [address]; MARKERS PRESENT AS [address]  OPTI-VOL FLUID THRESHOLD TRENDING UPWARD  WILL CK IN 31D  NORMAL DEVICE FUNCTION  NC       Review of Systems:  Review of Systems   Constitutional: Negative for diaphoresis, fatigue, fever and unexpected weight change  HENT: Negative  Respiratory: Negative for cough, shortness of breath and wheezing  Cardiovascular: Negative for chest pain, palpitations and leg swelling  Gastrointestinal: Negative for abdominal pain, diarrhea and nausea  Musculoskeletal: Negative for gait problem and myalgias  Skin: Negative for rash  Neurological: Negative for dizziness and numbness  Psychiatric/Behavioral: Negative          Physical Exam:  Physical Exam  Constitutional:       Appearance: She is well-developed  HENT:      Head: Normocephalic and atraumatic  Eyes:      Pupils: Pupils are equal, round, and reactive to light  Neck:      Vascular: No JVD  Cardiovascular:      Rate and Rhythm: Regular rhythm  Pulses: Normal pulses  Carotid pulses are 2+ on the right side and 2+ on the left side  Heart sounds: S1 normal and S2 normal    Pulmonary:      Effort: Pulmonary effort is normal       Breath sounds: Normal breath sounds  No wheezing or rales  Abdominal:      General: Bowel sounds are normal       Palpations: Abdomen is soft  Tenderness: There is no abdominal tenderness  Musculoskeletal:         General: No tenderness  Normal range of motion  Cervical back: Normal range of motion and neck supple  Skin:     General: Skin is warm  Neurological:      Mental Status: She is alert and oriented to person, place, and time  Cranial Nerves: No cranial nerve deficit  Deep Tendon Reflexes: Reflexes are normal and symmetric

## 2023-05-19 ENCOUNTER — REMOTE DEVICE CLINIC VISIT (OUTPATIENT)
Dept: CARDIOLOGY CLINIC | Facility: CLINIC | Age: 43
End: 2023-05-19

## 2023-05-19 DIAGNOSIS — Z95.810 AICD (AUTOMATIC CARDIOVERTER/DEFIBRILLATOR) PRESENT: Primary | ICD-10-CM

## 2023-05-19 NOTE — PROGRESS NOTES
MDT/CRT-D (DDD MODE)/ NOT MRI CONDITIONAL   CARELINK TRANSMISSION - OPTI-VOL/BATTERY STATUS ONLY: OPTI-VOL FLUID THRESHOLD REMAINS CROSSED SINCE 4/17/23 & ONGOING  NO DIURETICS; TASK TO CHF RN; RECHECK IN 31 DAYS WITH BATTERY STATUS; BATTERY VOLTAGE NEARING DANTE (8 MOS)   WILL SCHEDULE MONTHLY BATTERY CHECKS  AP <0 1% BVP 99 9% ( 98 6% + VSR PACE <0 1%); ALL AVAILABLE LEAD PARAMETERS WITHIN NORMAL LIMITS  NO SIGNIFICANT HIGH RATE EPISODES   NORMAL DEVICE FUNCTION   ES

## 2023-06-19 ENCOUNTER — REMOTE DEVICE CLINIC VISIT (OUTPATIENT)
Dept: CARDIOLOGY CLINIC | Facility: CLINIC | Age: 43
End: 2023-06-19

## 2023-06-19 DIAGNOSIS — Z95.810 AICD (AUTOMATIC CARDIOVERTER/DEFIBRILLATOR) PRESENT: Primary | ICD-10-CM

## 2023-06-19 PROCEDURE — RECHECK: Performed by: INTERNAL MEDICINE

## 2023-06-19 NOTE — PROGRESS NOTES
Results for orders placed or performed in visit on 06/19/23   Cardiac EP device report    Narrative    MDT/CRT-D (DDD MODE)/ NOT MRI CONDITIONAL  CARELINK TRANSMISSION: BATTERY VOLTAGE NEARING DANTE-7 MONS  WILL SCHEDULE MONTHLY BATTERY CHECKS  AP 0% CRT PACING (BIV 5% LV 95%)  ALL AVAILABLE LEAD PARAMETERS WITHIN NORMAL LIMITS  NO SIGNIFICANT HIGH RATE EPISODES  [address]; MARKERS PRESENT AS STACH & #[id number] PRESENTS AS NSVT  OPTI-VOL FLUID THRESHOLD CROSSED & ONGOING SINCE 5/2023  NORMAL DEVICE FUNCTION   NC

## 2023-07-21 ENCOUNTER — TELEPHONE (OUTPATIENT)
Dept: CARDIOLOGY CLINIC | Facility: CLINIC | Age: 43
End: 2023-07-21

## 2023-07-21 ENCOUNTER — REMOTE DEVICE CLINIC VISIT (OUTPATIENT)
Dept: CARDIOLOGY CLINIC | Facility: CLINIC | Age: 43
End: 2023-07-21

## 2023-07-21 DIAGNOSIS — Z95.810 AICD (AUTOMATIC CARDIOVERTER/DEFIBRILLATOR) PRESENT: Primary | ICD-10-CM

## 2023-07-21 PROCEDURE — RECHECK: Performed by: INTERNAL MEDICINE

## 2023-07-21 NOTE — PROGRESS NOTES
Results for orders placed or performed in visit on 07/21/23   Cardiac EP device report    Narrative    MDT/CRT-D (DDD MODE)/ NOT MRI CONDITIONAL  CARELINK TRANSMISSION: BATTERY VOLTAGE NEARING DANTE-6 MONS. WILL SCHEDULE MONTHLY BATTERY CHECKS. AP 0% CRT PACING (BIV) 7% (LV) 93%. ALL AVAILABLE LEAD PARAMETERS WITHIN NORMAL LIMITS. NO SIGNIFICANT HIGH RATE EPISODES. VENT SENSING EPISODES NOTED; MARKERS PRESENT AS FUSION & TACHYCARDIA. OPTI-VOL FLUID THRESHOLD CROSSED & ONGOING SINCE 4/2023. SENT TO HF/RN. NORMAL DEVICE FUNCTION.  NC

## 2023-07-21 NOTE — TELEPHONE ENCOUNTER
Olivia Clay at 7/21/2023 11:15 AM    Status: Signed       Results for orders placed or performed in visit on 07/21/23   Cardiac EP device report     Narrative     MDT/CRT-D (DDD MODE)/ NOT MRI CONDITIONAL  CARELINK TRANSMISSION: BATTERY VOLTAGE NEARING DANTE-6 MONS. WILL SCHEDULE MONTHLY BATTERY CHECKS. AP 0% CRT PACING (BIV) 7% (LV) 93%. ALL AVAILABLE LEAD PARAMETERS WITHIN NORMAL LIMITS. NO SIGNIFICANT HIGH RATE EPISODES. VENT SENSING EPISODES NOTED; MARKERS PRESENT AS FUSION & TACHYCARDIA. OPTI-VOL FLUID THRESHOLD CROSSED & ONGOING SINCE 4/2023. SENT TO HF/RN. NORMAL DEVICE FUNCTION.  NC                Tried to call [employer] at [phone] unable to leave a message vm wont accept message, will try again

## 2023-08-14 DIAGNOSIS — I42.0 DILATED CARDIOMYOPATHY (HCC): ICD-10-CM

## 2023-08-14 RX ORDER — LISINOPRIL 5 MG/1
TABLET ORAL
Qty: 60 TABLET | Refills: 8 | Status: SHIPPED | OUTPATIENT
Start: 2023-08-14

## 2023-08-24 ENCOUNTER — REMOTE DEVICE CLINIC VISIT (OUTPATIENT)
Dept: CARDIOLOGY CLINIC | Facility: CLINIC | Age: 43
End: 2023-08-24

## 2023-08-24 DIAGNOSIS — Z95.810 AICD (AUTOMATIC CARDIOVERTER/DEFIBRILLATOR) PRESENT: Primary | ICD-10-CM

## 2023-08-24 PROCEDURE — RECHECK: Performed by: INTERNAL MEDICINE

## 2023-08-24 NOTE — PROGRESS NOTES
Results for orders placed or performed in visit on 08/24/23   Cardiac EP device report    Narrative    MDT/CRT-D (DDD MODE)/ NOT MRI CONDITIONAL  CARELINK TRANSMISSION: BATTERY VOLTAGE NEARING DANTE (5 MTHS). WILL SCHEDULE MONTHLY BATTERY CHECKS. AP <0.1% BVP 99.8% ( 98.5% + VSRP 1.3%) ALL AVAILABLE LEAD PARAMETERS WITHIN NORMAL LIMITS. NO SIGNIFICANT HIGH RATE EPISODES. 71 V SENSE EPISODES WITH AVAILABLE MARKERS SHOWING PAT & FUSION BEATS. OPTI-VOL WITHIN NORMAL LIMITS. NORMAL DEVICE FUNCTION.  AM/NC

## 2023-09-08 DIAGNOSIS — I42.0 DILATED CARDIOMYOPATHY (HCC): ICD-10-CM

## 2023-09-08 RX ORDER — LISINOPRIL 5 MG/1
TABLET ORAL
Qty: 180 TABLET | Refills: 3 | Status: SHIPPED | OUTPATIENT
Start: 2023-09-08

## 2023-09-13 DIAGNOSIS — I42.0 DILATED CARDIOMYOPATHY (HCC): ICD-10-CM

## 2023-09-13 RX ORDER — CARVEDILOL 25 MG/1
TABLET ORAL
Qty: 60 TABLET | Refills: 14 | Status: SHIPPED | OUTPATIENT
Start: 2023-09-13

## 2023-09-16 ENCOUNTER — APPOINTMENT (OUTPATIENT)
Dept: LAB | Age: 43
End: 2023-09-16
Payer: COMMERCIAL

## 2023-09-16 DIAGNOSIS — K76.0 FATTY METAMORPHOSIS OF LIVER: ICD-10-CM

## 2023-09-16 LAB
ALBUMIN SERPL BCP-MCNC: 4.2 G/DL (ref 3.5–5)
ALP SERPL-CCNC: 63 U/L (ref 34–104)
ALT SERPL W P-5'-P-CCNC: 13 U/L (ref 7–52)
ANION GAP SERPL CALCULATED.3IONS-SCNC: 10 MMOL/L
AST SERPL W P-5'-P-CCNC: 13 U/L (ref 13–39)
BASOPHILS # BLD AUTO: 0.02 THOUSANDS/ÂΜL (ref 0–0.1)
BASOPHILS NFR BLD AUTO: 0 % (ref 0–1)
BILIRUB SERPL-MCNC: 0.28 MG/DL (ref 0.2–1)
BUN SERPL-MCNC: 17 MG/DL (ref 5–25)
CALCIUM SERPL-MCNC: 9.3 MG/DL (ref 8.4–10.2)
CHLORIDE SERPL-SCNC: 102 MMOL/L (ref 96–108)
CO2 SERPL-SCNC: 27 MMOL/L (ref 21–32)
CREAT SERPL-MCNC: 0.63 MG/DL (ref 0.6–1.3)
EOSINOPHIL # BLD AUTO: 0.19 THOUSAND/ÂΜL (ref 0–0.61)
EOSINOPHIL NFR BLD AUTO: 3 % (ref 0–6)
ERYTHROCYTE [DISTWIDTH] IN BLOOD BY AUTOMATED COUNT: 13.4 % (ref 11.6–15.1)
GFR SERPL CREATININE-BSD FRML MDRD: 108 ML/MIN/1.73SQ M
GLUCOSE P FAST SERPL-MCNC: 107 MG/DL (ref 65–99)
HCT VFR BLD AUTO: 36.6 % (ref 34.8–46.1)
HGB BLD-MCNC: 11.5 G/DL (ref 11.5–15.4)
IMM GRANULOCYTES # BLD AUTO: 0.02 THOUSAND/UL (ref 0–0.2)
IMM GRANULOCYTES NFR BLD AUTO: 0 % (ref 0–2)
LYMPHOCYTES # BLD AUTO: 1.88 THOUSANDS/ÂΜL (ref 0.6–4.47)
LYMPHOCYTES NFR BLD AUTO: 28 % (ref 14–44)
MCH RBC QN AUTO: 26.8 PG (ref 26.8–34.3)
MCHC RBC AUTO-ENTMCNC: 31.4 G/DL (ref 31.4–37.4)
MCV RBC AUTO: 85 FL (ref 82–98)
MONOCYTES # BLD AUTO: 0.71 THOUSAND/ÂΜL (ref 0.17–1.22)
MONOCYTES NFR BLD AUTO: 11 % (ref 4–12)
NEUTROPHILS # BLD AUTO: 3.97 THOUSANDS/ÂΜL (ref 1.85–7.62)
NEUTS SEG NFR BLD AUTO: 58 % (ref 43–75)
NRBC BLD AUTO-RTO: 0 /100 WBCS
PLATELET # BLD AUTO: 238 THOUSANDS/UL (ref 149–390)
PMV BLD AUTO: 11.7 FL (ref 8.9–12.7)
POTASSIUM SERPL-SCNC: 4.4 MMOL/L (ref 3.5–5.3)
PROT SERPL-MCNC: 7.3 G/DL (ref 6.4–8.4)
RBC # BLD AUTO: 4.29 MILLION/UL (ref 3.81–5.12)
SODIUM SERPL-SCNC: 139 MMOL/L (ref 135–147)
WBC # BLD AUTO: 6.79 THOUSAND/UL (ref 4.31–10.16)

## 2023-09-16 PROCEDURE — 80053 COMPREHEN METABOLIC PANEL: CPT

## 2023-09-16 PROCEDURE — 36415 COLL VENOUS BLD VENIPUNCTURE: CPT

## 2023-09-16 PROCEDURE — 85025 COMPLETE CBC W/AUTO DIFF WBC: CPT

## 2023-09-25 ENCOUNTER — REMOTE DEVICE CLINIC VISIT (OUTPATIENT)
Dept: CARDIOLOGY CLINIC | Facility: CLINIC | Age: 43
End: 2023-09-25

## 2023-09-25 DIAGNOSIS — Z95.810 PRESENCE OF IMPLANTABLE CARDIOVERTER-DEFIBRILLATOR (ICD): Primary | ICD-10-CM

## 2023-09-25 PROCEDURE — RECHECK: Performed by: INTERNAL MEDICINE

## 2023-09-25 NOTE — PROGRESS NOTES
MDT/CRT-D (DDD MODE)/ NOT MRI CONDITIONAL   NB CARELINK TRANSMISSION:  BATTERY VOLTAGE NEARING DANTE (5 MONTHS).  WILL SCHEDULE MONTHLY BATTERY CHECKS.  AP <0.1% BP 98.6% VSRP 1.3%.  ALL LEAD PARAMETERS WITHIN NORMAL LIMITS.  NO SIGNIFICANT HIGH RATE EPISODES.   59 V SENSE EPISODES WITH MARKERS FOR ST.  OPTI-VOL WITHIN NORMAL LIMITS.  NORMAL DEVICE FUNCTION.  RG

## 2023-10-02 ENCOUNTER — OFFICE VISIT (OUTPATIENT)
Dept: OBGYN CLINIC | Facility: CLINIC | Age: 43
End: 2023-10-02
Payer: COMMERCIAL

## 2023-10-02 ENCOUNTER — APPOINTMENT (OUTPATIENT)
Dept: RADIOLOGY | Age: 43
End: 2023-10-02
Payer: COMMERCIAL

## 2023-10-02 VITALS
BODY MASS INDEX: 33.04 KG/M2 | DIASTOLIC BLOOD PRESSURE: 84 MMHG | HEIGHT: 61 IN | SYSTOLIC BLOOD PRESSURE: 121 MMHG | HEART RATE: 81 BPM | WEIGHT: 175 LBS

## 2023-10-02 DIAGNOSIS — M25.511 ACUTE PAIN OF RIGHT SHOULDER: Primary | ICD-10-CM

## 2023-10-02 DIAGNOSIS — M75.81 RIGHT ROTATOR CUFF TENDINITIS: ICD-10-CM

## 2023-10-02 DIAGNOSIS — M25.511 ACUTE PAIN OF RIGHT SHOULDER: ICD-10-CM

## 2023-10-02 PROCEDURE — 99203 OFFICE O/P NEW LOW 30 MIN: CPT | Performed by: PHYSICIAN ASSISTANT

## 2023-10-02 PROCEDURE — 20610 DRAIN/INJ JOINT/BURSA W/O US: CPT | Performed by: PHYSICIAN ASSISTANT

## 2023-10-02 PROCEDURE — 73030 X-RAY EXAM OF SHOULDER: CPT

## 2023-10-02 RX ORDER — TRIAMCINOLONE ACETONIDE 40 MG/ML
80 INJECTION, SUSPENSION INTRA-ARTICULAR; INTRAMUSCULAR
Status: COMPLETED | OUTPATIENT
Start: 2023-10-02 | End: 2023-10-02

## 2023-10-02 RX ORDER — BUPIVACAINE HYDROCHLORIDE 2.5 MG/ML
2 INJECTION, SOLUTION INFILTRATION; PERINEURAL
Status: COMPLETED | OUTPATIENT
Start: 2023-10-02 | End: 2023-10-02

## 2023-10-02 RX ADMIN — TRIAMCINOLONE ACETONIDE 80 MG: 40 INJECTION, SUSPENSION INTRA-ARTICULAR; INTRAMUSCULAR at 16:00

## 2023-10-02 RX ADMIN — BUPIVACAINE HYDROCHLORIDE 2 ML: 2.5 INJECTION, SOLUTION INFILTRATION; PERINEURAL at 16:00

## 2023-10-02 NOTE — PATIENT INSTRUCTIONS
Rotator Cuff Tendinitis   AMBULATORY CARE:   Rotator cuff tendinitis  is inflammation of the tendons in your shoulder joint. A tendon is a cord of tough tissue that connects your muscles to your bones. The rotator cuff is made up of a group of muscles and tendons that hold the shoulder joint in place. Common signs and symptoms:   Pain and swelling in your shoulder, especially when you lift your arm over your head    Pain that is worse after you sleep on the affected shoulder    Pain can become worse and you may have pain even when you are resting    Shoulder and arm weakness    Call your doctor or orthopedist if:   You have sudden shortness of breath or chest pain. Any part of your arm is numb, tingly, cold, blue, or pale. You have pain and swelling in your shoulder even after you take pain medicine. Your skin is itchy, swollen, or has a rash. Your symptoms are not getting better or are getting worse. You have questions or concerns about your condition or care. Treatment  may include any of the following:  Medicines  such as steroids or NSAIDs may be used to reduce swelling. This can help relieve pain. Steroids may be injected into the rotator cuff area. NSAIDs are available without a doctor's order. Ask your healthcare provider which medicine is right for you. Ask how much to take and when to take it. Take as directed. NSAIDs can cause stomach bleeding or kidney problems if not taken correctly. Surgery  may be needed if the pain and tightening in your shoulder do not go away. This may also be done if pain worsens or is so severe that it affects your daily activities. During surgery, your healthcare provider may remove bone spurs and inflamed tissue around the shoulder. Physical therapy  can help you improve movement and strength, and decrease pain. A physical therapist will teach you safe exercises.  The exercises may help you move your shoulder normally again and strengthen your rotator cuff. You may learn changes to make to your daily activities that will help decrease stress on your tendons. Care for your rotator cuff tendinitis at home:   Rest as directed. Limit activity on your affected shoulder to decrease stress on the tendon. This may help prevent further damage, decrease pain, and promote healing. Apply ice on your shoulder area. Ice helps decrease swelling and pain. Ice may also help prevent tissue damage. Use an ice pack, or put crushed ice in a plastic bag. Cover it with a towel and place it on your shoulder for 15 to 20 minutes every hour or as directed. Keep your shoulder in the correct position so it will heal faster. This may be done by increasing the height of armrests while you work, drive, and sit. Try not to sleep on the side of your injured shoulder. If you are a woman, wear a sports bra so that the straps are closer to your neck. This may help decrease stress in the affected shoulder. Follow up with your doctor or orthopedist as directed:  Write down your questions so you remember to ask them during your visits. © Copyright Annmarie Douglas 2023 Information is for End User's use only and may not be sold, redistributed or otherwise used for commercial purposes. The above information is an  only. It is not intended as medical advice for individual conditions or treatments. Talk to your doctor, nurse or pharmacist before following any medical regimen to see if it is safe and effective for you.

## 2023-10-02 NOTE — PROGRESS NOTES
Orthopaedic Surgery - Office Note  Krystal Chang (69 y.o. female)   : 1980   MRN: [id number]  Encounter Date: 10/2/2023    Chief Complaint   Patient presents with   • Right Shoulder - Pain         Assessment/Plan  Diagnoses and all orders for this visit:    Acute pain of right shoulder  -     XR shoulder 2+ vw right; Future    Right rotator cuff tendinitis         No follow-ups on file. History of Present Illness  This is a new patient with right shoulder pain. She has not had any treatment. The symptoms started about a month ago without any known injury. The symptoms are worse when she lies down to try and sleep at night, lift her arm overhead, or reach behind her back. No paresthesias are noted. The pain is located in the lateral shoulder and does not radiate. She has not had problems like this in the past.  She is right-hand dominant. She is not diabetic. He denies any contributing medical history    Review of Systems  Pertinent items are noted in HPI. All other systems were reviewed and are negative. Physical Exam  /84 (BP Location: Left arm, Patient Position: Sitting, Cuff Size: Standard)   Pulse 81   Ht 5' 1" (1.549 m)   Wt 79.4 kg (175 lb)   BMI 33.07 kg/m²   Cons: Appears well. No apparent distress. Psych: Alert. Oriented x3. Mood and affect normal.  Right shoulder active forward flexion is to 110 degrees passively to 170 degrees with end motion pain. Internal rotation is to the buttock actively. External rotation is to 50 degrees. She has a positive impingement sign. She has a negative drop arm testing for weakness but does have pain. Internal rotation and external rotation strength is 5 out of 5 with mild pain. She is neurovascular intact in the right upper extremity. She has no AC joint tenderness           Studies Reviewed  X-rays performed in the office today 3 views of the right shoulder show no acute fractures or dislocations.   No significant degenerative changes are noted. No calcific tendinitis is seen. X-rays were reviewed from orthopedic standpoint will await official radiologist interpretation    Large joint arthrocentesis: R subacromial bursa  Universal Protocol:  Consent: Verbal consent obtained. Risks and benefits: risks, benefits and alternatives were discussed  Consent given by: patient  Time out: Immediately prior to procedure a "time out" was called to verify the correct patient, procedure, equipment, support staff and site/side marked as required. Patient understanding: patient states understanding of the procedure being performed  Patient consent: the patient's understanding of the procedure matches consent given  Relevant documents: relevant documents present and verified  Test results: test results available and properly labeled  Site marked: the operative site was marked  Radiology Images displayed and confirmed. If images not available, report reviewed: imaging studies available  Patient identity confirmed: verbally with patient    Supporting Documentation  Indications: pain   Procedure Details  Location: shoulder - R subacromial bursa  Needle size: 22 G  Ultrasound guidance: no  Approach: posterior  Medications administered: 2 mL bupivacaine 0.25 %; 80 mg triamcinolone acetonide 40 mg/mL    Patient tolerance: patient tolerated the procedure well with no immediate complications  Dressing:  Sterile dressing applied        Medical, Surgical, Family, and Social History  The patient's medical history, family history, and social history, were reviewed and updated as appropriate.     Past Medical History:   Diagnosis Date   • Allergic rhinitis    • Anxiety    • Atrial fibrillation (HCC)     paroxysmal   • Bunion 1985   • Candidal vulvovaginitis    • Cardiomyopathy ([address])    • Chest pain    • CHF (congestive heart failure) ([facility])     chronic systolic   • Coronary artery disease    • Hammertoe of right foot    • Herpes simplex    • Irregular heart beat    • LBBB (left bundle branch block)    • Normal delivery     2005 son, 2008 daughter   • Pacemaker        Past Surgical History:   Procedure Laterality Date   • BUNIONECTOMY Bilateral    • BUNIONECTOMY Right 9/29/2022    Procedure: Ellie Mobley;  Surgeon: Alejandro Witt DPM;  [facility];  Service: Podiatry   • CARDIAC DEFIBRILLATOR PLACEMENT     • AL EXCISION EXCESSIVE SKIN & SUBQ TISSUE ABDOMEN N/A 05/29/2019    Procedure: CIRCUMFERENTIAL ABDOMINOPLASTY;  Surgeon: Jose L Esparza MD;  Location: [address] [place] [address] MAIN OR;  Service: Plastics   • RECESSION GASTROC ENDOSCOPIC Right 9/29/2022    Procedure: RECESSION GASTROC ENDOSCOPIC;  Surgeon: Alejandro Witt DPM;  [facility];  Service: Podiatry   • TOE OSTEOTOMY Right 9/29/2022    Procedure: 2ND HAMMERTOE REPAIR;  Surgeon: Pippa Montemayor DPM;  [facility];  Service: Podiatry   • TUBAL LIGATION  2009       Family History   Problem Relation Age of Onset   • Diabetes Father    • Hypertension Father    • Heart disease Father    • Hyperlipidemia Father    • Hypertension Mother    • No Known Problems Brother    • No Known Problems Brother    • No Known Problems Son    • No Known Problems Daughter    • No Known Problems Maternal Grandmother    • No Known Problems Maternal Grandfather    • No Known Problems Paternal Grandmother    • No Known Problems Paternal Grandfather    • No Known Problems Maternal Aunt    • No Known Problems Paternal Aunt    • No Known Problems Paternal Aunt    • No Known Problems Paternal Aunt        Social History     Occupational History   • 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   Tobacco Use   • Smoking status: Never   • Smokeless tobacco: Never   • Tobacco comments:     history of second hand smoke exposure as a child   Vaping Use   • Vaping Use: Never used   Substance and Sexual Activity   • Alcohol use:  Yes     Alcohol/week: 2.0 standard drinks of alcohol     Types: 2 Glasses of wine per week Comment: social   • Drug use: No   • Sexual activity: Yes     Partners: Male     Birth control/protection: Female Sterilization       No Known Allergies      Current Outpatient Medications:   •  ALPRAZolam (XANAX) 0.5 mg tablet, Take 0.5 tablets (0.25 mg total) by mouth 3 (three) times a day as needed for anxiety, Disp: 20 tablet, Rfl: 0  •  carvedilol (COREG) 25 mg tablet, TAKE 1 TABLET BY MOUTH TWICE A DAY, Disp: 60 tablet, Rfl: 14  •  lisinopril (ZESTRIL) 5 mg tablet, TAKE 1 TABLET BY MOUTH TWICE A DAY, Disp: 180 tablet, Rfl: 3  •  pantoprazole (PROTONIX) 40 mg tablet, Take 1 tablet (40 mg total) by mouth daily (Patient not taking: Reported on 5/8/2023), Disp: 60 tablet, Rfl: 0  •  scopolamine (TRANSDERM-SCOP) 1 mg/3 days TD 72 hr patch, Place 1 patch on the skin over 72 hours every third day, Disp: 4 patch, Rfl: 0      Maged Gonzalez PA-C

## 2023-10-05 DIAGNOSIS — I42.0 DILATED CARDIOMYOPATHY (HCC): ICD-10-CM

## 2023-10-05 RX ORDER — CARVEDILOL 25 MG/1
TABLET ORAL
Qty: 180 TABLET | Refills: 5 | Status: SHIPPED | OUTPATIENT
Start: 2023-10-05

## 2023-10-15 ENCOUNTER — APPOINTMENT (OUTPATIENT)
Dept: LAB | Age: 43
End: 2023-10-15
Payer: COMMERCIAL

## 2023-10-15 DIAGNOSIS — K75.81 NONALCOHOLIC STEATOHEPATITIS: ICD-10-CM

## 2023-10-15 DIAGNOSIS — E78.5 HYPERLIPIDEMIA, UNSPECIFIED HYPERLIPIDEMIA TYPE: ICD-10-CM

## 2023-10-15 LAB
CHOLEST SERPL-MCNC: 211 MG/DL
HDLC SERPL-MCNC: 54 MG/DL
LDLC SERPL CALC-MCNC: 136 MG/DL (ref 0–100)
NONHDLC SERPL-MCNC: 157 MG/DL
TRIGL SERPL-MCNC: 103 MG/DL

## 2023-10-15 PROCEDURE — 36415 COLL VENOUS BLD VENIPUNCTURE: CPT

## 2023-10-15 PROCEDURE — 80061 LIPID PANEL: CPT

## 2023-10-20 ENCOUNTER — HOSPITAL ENCOUNTER (OUTPATIENT)
Dept: GASTROENTEROLOGY | Facility: HOSPITAL | Age: 43
Setting detail: OUTPATIENT SURGERY
End: 2023-10-20
Attending: INTERNAL MEDICINE
Payer: COMMERCIAL

## 2023-10-20 ENCOUNTER — ANESTHESIA EVENT (OUTPATIENT)
Dept: GASTROENTEROLOGY | Facility: HOSPITAL | Age: 43
End: 2023-10-20

## 2023-10-20 ENCOUNTER — ANESTHESIA (OUTPATIENT)
Dept: GASTROENTEROLOGY | Facility: HOSPITAL | Age: 43
End: 2023-10-20

## 2023-10-20 VITALS
BODY MASS INDEX: 30.58 KG/M2 | SYSTOLIC BLOOD PRESSURE: 102 MMHG | TEMPERATURE: 97.2 F | OXYGEN SATURATION: 98 % | WEIGHT: 162 LBS | HEART RATE: 89 BPM | RESPIRATION RATE: 14 BRPM | HEIGHT: 61 IN | DIASTOLIC BLOOD PRESSURE: 65 MMHG

## 2023-10-20 DIAGNOSIS — Z12.11 ENCOUNTER FOR SCREENING FOR MALIGNANT NEOPLASM OF COLON: ICD-10-CM

## 2023-10-20 RX ORDER — PROPOFOL 10 MG/ML
INJECTION, EMULSION INTRAVENOUS AS NEEDED
Status: DISCONTINUED | OUTPATIENT
Start: 2023-10-20 | End: 2023-10-20

## 2023-10-20 RX ORDER — SODIUM CHLORIDE, SODIUM LACTATE, POTASSIUM CHLORIDE, CALCIUM CHLORIDE 600; 310; 30; 20 MG/100ML; MG/100ML; MG/100ML; MG/100ML
INJECTION, SOLUTION INTRAVENOUS CONTINUOUS PRN
Status: DISCONTINUED | OUTPATIENT
Start: 2023-10-20 | End: 2023-10-20

## 2023-10-20 RX ADMIN — PROPOFOL 50 MG: 10 INJECTION, EMULSION INTRAVENOUS at 13:35

## 2023-10-20 RX ADMIN — PROPOFOL 50 MG: 10 INJECTION, EMULSION INTRAVENOUS at 13:42

## 2023-10-20 RX ADMIN — PROPOFOL 50 MG: 10 INJECTION, EMULSION INTRAVENOUS at 13:32

## 2023-10-20 RX ADMIN — SODIUM CHLORIDE, SODIUM LACTATE, POTASSIUM CHLORIDE, AND CALCIUM CHLORIDE: .6; .31; .03; .02 INJECTION, SOLUTION INTRAVENOUS at 13:23

## 2023-10-20 RX ADMIN — PROPOFOL 50 MG: 10 INJECTION, EMULSION INTRAVENOUS at 13:39

## 2023-10-20 RX ADMIN — PROPOFOL 150 MG: 10 INJECTION, EMULSION INTRAVENOUS at 13:29

## 2023-10-20 NOTE — ANESTHESIA POSTPROCEDURE EVALUATION
Post-Op Assessment Note    CV Status:  Stable    Pain management: adequate     Mental Status:  Alert and awake   Hydration Status:  Euvolemic   PONV Controlled:  Controlled   Airway Patency:  Patent      Post Op Vitals Reviewed: Yes      Staff: CRNA, Anesthesiologist         No notable events documented.     BP   116/55   Temp 98   Pulse 61   Resp 16   SpO2 100

## 2023-10-20 NOTE — H&P
History and Physical -  [facility]  Derrell Barba 37 y.o. female MRN: [id number]                  HPI: Derrell Barba is a 37y.o. year old female who presents for screening colonoscopy. No GI symptoms. No family history of colon cancer. No prior colonoscopy. REVIEW OF SYSTEMS: Per the HPI, and otherwise unremarkable.     Historical Information   Past Medical History:   Diagnosis Date    Allergic rhinitis     Anxiety     Atrial fibrillation (HCC)     paroxysmal    Bunion 1985    Candidal vulvovaginitis     Cardiomyopathy ([address])     Chest pain     CHF (congestive heart failure) (HCC)     chronic systolic    Coronary artery disease     Hammertoe of right foot     Herpes simplex     Irregular heart beat     LBBB (left bundle branch block)     Normal delivery     2005 son, 2008 daughter    Pacemaker      Past Surgical History:   Procedure Laterality Date    BUNIONECTOMY Bilateral     BUNIONECTOMY Right 9/29/2022    Procedure: Marie Drcecille;  Surgeon: Trish Fountain DPM;  Location[facility];  Service: [address] [place] [address] ABDOMEN N/A 05/29/2019    Procedure: CIRCUMFERENTIAL ABDOMINOPLASTY;  Surgeon: General Justin MD;  Location: [facility] MAIN OR;  Service: Plastics    RECESSION GASTROC ENDOSCOPIC Right 9/29/2022    Procedure: RECESSION GASTROC ENDOSCOPIC;  Surgeon: Trish Fountain DPM;  [facility];  Service: Podiatry    TOE OSTEOTOMY Right 9/29/2022    Procedure: 2ND HAMMERTOE REPAIR;  Surgeon: Trish Fountain DPM;  [facility];  Service: Podiatry    TUBAL LIGATION  2009     Social History   Social History     Substance and Sexual Activity   Alcohol Use Yes    Alcohol/week: 2.0 standard drinks of alcohol    Types: 2 Glasses of wine per week    Comment: social     Social History     Substance and Sexual Activity   Drug Use No     Social History     Tobacco Use   Smoking Status Never   Smokeless Tobacco Never   Tobacco Comments    history of second hand smoke exposure as a child     Family History   Problem Relation Age of Onset    Diabetes Father     Hypertension Father     Heart disease Father     Hyperlipidemia Father     Hypertension Mother     No Known Problems Brother     No Known Problems Brother     No Known Problems Son     No Known Problems Daughter     No Known Problems Maternal Grandmother     No Known Problems Maternal Grandfather     No Known Problems Paternal Grandmother     No Known Problems Paternal Grandfather     No Known Problems Maternal Aunt     No Known Problems Paternal Aunt     No Known Problems Paternal Aunt     No Known Problems Paternal Aunt        Meds/Allergies       Current Outpatient Medications:     carvedilol (COREG) 25 mg tablet    lisinopril (ZESTRIL) 5 mg tablet    ALPRAZolam (XANAX) 0.5 mg tablet  No current facility-administered medications for this encounter. Facility-Administered Medications Ordered in Other Encounters:     lactated ringers infusion, , Intravenous, Continuous PRN, New Bag at 10/20/23 1323    No Known Allergies    Objective     /80   Pulse 89   Temp 99.2 °F (37.3 °C) (Tympanic)   Resp 18   Ht 5' 1" (1.549 m)   Wt 73.5 kg (162 lb)   LMP 10/14/2023 (Exact Date) Comment: tubal  SpO2 98%   BMI 30.61 kg/m²       PHYSICAL EXAM    Gen: NAD  Head: NCAT  CV: RRR  CHEST: Clear  ABD: soft, NT/ND  EXT: no edema      ASSESSMENT/PLAN:  This is a 37y.o. year old female here for colonoscopy, and she is stable and optimized for her procedure.

## 2023-10-20 NOTE — ANESTHESIA PREPROCEDURE EVALUATION
Procedure:  COLONOSCOPY    Relevant Problems   CARDIO   (+) Chest pain on breathing   (+) Left bundle-branch block   (+) Mitral regurgitation   (+) Paroxysmal atrial fibrillation (HCC)      NEURO/PSYCH   (+) Anxiety        Physical Exam    Airway    Mallampati score: I  TM Distance: >3 FB  Neck ROM: full     Dental   No notable dental hx     Cardiovascular  Rhythm: regular, Rate: normal, Cardiovascular exam normal    Pulmonary  Pulmonary exam normal     Other Findings  Device interrogation 9/25/23:     Narrative  MDT/CRT-D (DDD MODE)/ NOT MRI CONDITIONAL  NB CARELINK TRANSMISSION:  BATTERY VOLTAGE NEARING DANTE (5 MONTHS). WILL SCHEDULE MONTHLY BATTERY CHECKS. AP <0.1% BP 98.6% VSRP 1.3%. ALL LEAD PARAMETERS WITHIN NORMAL LIMITS. NO SIGNIFICANT HIGH RATE EPISODES. OPTI-VOL WITHIN NORMAL LIMITS. NORMAL DEVICE FUNCTION. RG    Echo 2/17/2022:        ·  Left Ventricle: Left ventricular cavity size is normal. Wall thickness is normal. The left ventricular ejection fraction is 50%. Systolic function is low normal. Global longitudinal strain is mildly reduced at -16%. Wall motion is normal. Diastolic function is normal.  ·  IVS: There is abnormal septal motion consistent with right ventricular pacing. ·  Right Ventricle: Right ventricular cavity size is normal. Systolic function is normal. A device wire is present. ·  Mitral Valve: There is mild annular calcification. There is mild regurgitation. Anesthesia Plan  ASA Score- 2     Anesthesia Type- IV sedation with anesthesia with ASA Monitors. Additional Monitors:     Airway Plan:            Plan Factors-Exercise tolerance (METS): >4 METS. Chart reviewed. EKG reviewed. Imaging results reviewed. Existing labs reviewed. Patient summary reviewed.     Current smoker: Smoking Status: Never Smokeless Tobacco Status: Never Comments: history of second hand smoke exposure as a child Alcohol use: Yes; 2.0 standard drinks of alcohol per week Comments: social Drug use: No.  Patient instructed to abstain from smoking on day of procedure. Patient did not smoke on day of surgery. Obstructive sleep apnea risk education given perioperatively. Induction- intravenous. Postoperative Plan- Plan for postoperative opioid use. Informed Consent- Anesthetic plan and risks discussed with patient. I personally reviewed this patient with the CRNA. Discussed and agreed on the Anesthesia Plan with the CRNA. Pati Yan

## 2023-10-24 ENCOUNTER — REMOTE DEVICE CLINIC VISIT (OUTPATIENT)
Dept: CARDIOLOGY CLINIC | Facility: CLINIC | Age: 43
End: 2023-10-24

## 2023-10-24 DIAGNOSIS — Z95.810 AICD (AUTOMATIC CARDIOVERTER/DEFIBRILLATOR) PRESENT: Primary | ICD-10-CM

## 2023-10-24 PROCEDURE — RECHECK: Performed by: INTERNAL MEDICINE

## 2023-10-24 NOTE — PROGRESS NOTES
Results for orders placed or performed in visit on 10/24/23   Cardiac EP device report    Narrative    MDT/CRT-D (DDD MODE)/ NOT MRI CONDITIONAL  NB/CARELINK TRANSMISSION: BATTERY VOLTAGE NEARING DANTE-3 MONS. WILL SCHEDULE MONTHLY BATTERY CHECKS. AP 0% CRT PACING (BIV) 7% (LV) 93%. ALL AVAILABLE LEAD PARAMETERS WITHIN NORMAL LIMITS. NO SIGNIFICANT HIGH RATE EPISODES. [address]; [address] OPTI-VOL WITHIN NORMAL LIMITS. NORMAL DEVICE FUNCTION.  NC

## 2023-10-26 ENCOUNTER — APPOINTMENT (OUTPATIENT)
Dept: LAB | Age: 43
End: 2023-10-26
Payer: COMMERCIAL

## 2023-10-26 ENCOUNTER — HOSPITAL ENCOUNTER (OUTPATIENT)
Dept: RADIOLOGY | Age: 43
Discharge: HOME | End: 2023-10-26
Payer: COMMERCIAL

## 2023-10-26 DIAGNOSIS — R10.10 UPPER ABDOMINAL PAIN, UNSPECIFIED: ICD-10-CM

## 2023-10-26 DIAGNOSIS — R10.30 LOWER ABDOMINAL PAIN, UNSPECIFIED: ICD-10-CM

## 2023-10-26 PROCEDURE — 74177 CT ABD & PELVIS W/CONTRAST: CPT

## 2023-10-26 RX ADMIN — IOHEXOL 50 ML: 240 INJECTION, SOLUTION INTRATHECAL; INTRAVASCULAR; INTRAVENOUS; ORAL at 14:35

## 2023-10-26 RX ADMIN — IOHEXOL 100 ML: 350 INJECTION, SOLUTION INTRAVENOUS at 14:35

## 2023-10-26 NOTE — PROGRESS NOTES
PT Evaluation     Today's date: 10/27/2023  Patient name: Beatris Dick  : 1980  MRN: [id number]  Referring provider: BOLA Kovacs  Dx:   Encounter Diagnosis     ICD-10-CM    1. Acute pain of right shoulder  M25.511           Start Time: 845  Stop Time: 0945  Total time in clinic (min): 60 minutes    Assessment  Assessment details: Beatris Dick is a pleasant 37year old female with a referred dx of right rotator cuff tendinitis from [place] PA-C. No further referral appears necessary at this time based upon examination results. Physical exam is most consistent with mechanical derangement of right shoulder potentially causing impingement-related symptoms. Upon further clinical testing, patient presents with the following deficits: right shoulder active and passive motor dysfunction, pain with end-range movement, decrease postural enurance and periscapuar motor control/strength, positive clinical tests that indicative of supraspinatus tendinopathy and/or impingement syndrome. I have reduce suspicions of significant RTC injury or tear due to no distinct NIKOS and patient clinical presentation. 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 These deficits and impairments result in pain with movements, inability to perform lifting and reaching tasks, and decrease sleep quality due to pain when laying on right side. Pt was provided with a basic HEP focused on gentle AAROM which will be reviewed in the upcoming session. Pt able to demonstrate HEP with good technique and no pain. Educated pt to stop any exercises causing pain increase, pt verbalizes understanding. Pt was educated on anatomy and physiology of diagnosis and demonstrated verbal understanding.  Positive prognostic indicators include positive attitude toward recovery, good understanding of diagnosis and treatment plan options, acuity of symptoms, absence of peripheralization, and absence of observed red flags. Negative prognostic indicators include high symptom irritability and minimal changes in pain with movement. Pt would benefit from skilled OP physical therapy to address these, and the below impairments in order to optimize outcomes and promote return to functional baseline. Problem List:  1) Mechanical Derangement of RUE  2) Decrease Active Shoulder Mobility  3) Decrease Periscapular Motor Control and Endurance     Comparable signs:  1) OH and Reaching Movements  2)  Side-Lying on R side        Patient verbalized understanding of POC. Please contact me if you have any questions or recommendations. Thank you for the referral and the opportunity to share in Syndia's care. Impairments: abnormal coordination, abnormal muscle firing, abnormal or restricted ROM, abnormal movement, activity intolerance, impaired physical strength, lacks appropriate home exercise program, pain with function, scapular dyskinesis, poor posture  and poor body mechanics    Symptom irritability: moderateUnderstanding of Dx/Px/POC: good   Prognosis: good    Goals  Short Term Goals: In 2-4 weeks, the patient will:  1. Pt will report at least a 25% reduction in subjective pain complaints/symptoms to better manage ADLs and household chores. 2. Pt will have a 5-10 degree improvement in her shoulder AROM  3. Pt independent with initial HEP, rationale, technique and frequency, for ROM and pain control. 4. Pt will have improvement in her periscapular motor control and postural endurance      Long Term Goals: In 8+ weeks, the patient will:  1. PT will have WNL shoulder active and passive ROM with no pain at end-range  2. FOTO to greater than predicted value. 3. Independent with comprehensive HEP upon discharge. 4. Pt will be able to perform ADLs, iADLS, and household duties with minimal restriction indicating return to PLOF.   5. Pt independent with rationale, technique and plan for performance of advanced HEP to ensure independent self-management of symptoms upon discharge. 6. Pt will have no pain with reaching OH, lifting, and right-side sleeping  7. Pt will have 5/5 periscapular muscle strength      Plan  Patient would benefit from: PT eval and skilled physical therapy  Referral necessary: No  Planned therapy interventions: activity modification, ADL retraining, ADL training, IASTM, joint mobilization, manual therapy, motor coordination training, nerve gliding, behavior modification, body mechanics training, neuromuscular re-education, patient education, postural training, sensory integrative techniques, strengthening, stretching, therapeutic activities, therapeutic exercise, therapeutic training, home exercise program, graded motor, graded exercise, graded activity, functional ROM exercises, fine motor coordination training and community reintegration  Frequency: 1x week  Plan of Care beginning date: 10/27/2023  Plan of Care expiration date: 12/22/2023  Treatment plan discussed with: patient      Subjective Evaluation    History of Present Illness  Mechanism of injury: Shawna Valenzuela presents for evaluation regarding acute right shoulder pain that began approximately 1 month ago with unknown cause. Shoulder pain is mostly in posterosuperior aspect and radiates into the neck. Denies radicular symptoms. Patient notes sometimes she stretches a weird way with her neck, extension it hurts. Shoulder hurts the worse with reaching and laying on the right side. 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 Notes she tends to have poor posture throughout the day. Patient had a CSI in the shoulder at last Ortho appt, had good relief.            Not a recurrent problem   Quality of life: good    Patient Goals  Patient goals for therapy: increased strength, independence with ADLs/IADLs, return to sport/leisure activities, increased motion and decreased pain    Pain  Current pain ratin  At best pain ratin  At worst pain rating: 3  Location: Right Shoulder  Relieving factors: change in position  Aggravating factors: overhead activity and lifting  Progression: worsening    Social Support    Employment status: working  Hand dominance: right  Exercise history: No      Diagnostic Tests  X-ray: normal  Treatments  Previous treatment: medication and injection treatment  Current treatment: injection treatment and physical therapy    Objective    Red Flags: None    MRI Impressions 10/2/2023: No acute osseous abnormality. Standing         Head Position x Protracted  Neutral  Retracted   Scapular Position  Protracted x Neutral  Retracted   Thoracic Spine x Inc Kyphosis  Neutral       Strength and ROM evaluated B from a regional biomechanical perspective and values relevant to this episode recorded in table below    Active ROM: Goniometric measurement revealed the following findings. Shoulder ROM Right Left   Flexion WNL  WNL   Abduction 170 P! WNL   BTH Occiput P! WNL   BTB Unable P! WNL     Passive ROM: Goniometric measurement revealed the following findings. Shoulder ROM Right Left   Flexion 135 P! WNL   Abduction 100 P! WNL   ER 35 @ 90  WNL   IR WNL WNL       Strength: MMT revealed the following findings.   Joint Motion Right Left   Sh. Flexion 4/5 5/5   Sh. Abduction 5/5 5/5   Sh. ER 5/5 5/5   Sh. IR 5/5 5/5   Elbow Flex 5/5 5/5   Elbow ext 5/5 5/5   Lower Trap 3/5 P! -   Mid Trap 3/5 P! -       Additional Assessments:  Palpation: Tender to palpation and slight warmth to touch along right supraspinatus and posterior deltoid region  Joint mobility: relieving with posterior mobilization, WNL no signs of hypomobility or creptius     C/s Screen:   ROM: WNL, denies radicular symptoms    Neuro Screen:   Dermatomes: Intact  Myotomes: Intact  Reflexes:    Biceps: 2   Brachioradialis: 2   Triceps: 2    Shoulder Specific Special Tests: Test / Measure  Right    Stewart-Lino +   Painful Arc +   Infraspinatus Strength Test -   Supraspinatus (empty can) +   Neer -     Flowsheet Rows      Flowsheet Row Most Recent Value   PT/OT G-Codes    Current Score 61   Projected Score 74             Precautions: Anxiety, A-Fib, Cardiomyopathy, CHF, CAD    Pertinent Findings:        POC End Date: 12/23/2023                                                                                                             Test / Measure  9/15/2022   FOTO (Predicted 74) 61   Shoulder AROM Flex and Abduction P! Shoulder MMT I/Y/Ts   3/5 P! Decrease Postural Endurance and Motor Control Poor   Auth Tracking    Auth Expires on  11/27/2023   Total Auth Visits  12   Total Annual Visits              -     Access Code: [email or web address]  URL: [email or web address]  Date: 10/27/2023  Prepared by: Altamese Marcoser    Exercises  - Supine Shoulder Flexion Extension AAROM with Dowel  - 1 x daily - 7 x weekly - 2-3 sets - 10 reps  - Supine Shoulder Abduction AAROM with Dowel  - 1 x daily - 7 x weekly - 2-3 sets - 10 reps  - Seated Shoulder Flexion Slide at Table Top with Forearm in Neutral  - 1 x daily - 7 x weekly - 3 sets - 10 reps    Appt  1            Manuals 10/27            Right PROM             GHJ Posterior and Inferior Mobilization             Scapular Mobilization in SL                          Neuro Re-Ed             Shoulder AAROM with Cane Flex/Abd/ER             Side-Lying Shoulder Flex/Scap/IR/ER             Towel Slides with Scap Setting                                                                 Ther Ex             Pulley's             Supine Protraction Wall Punches             Shoulder Resisted Extension                                                                                            Ther Activity Gait Training                                       Modalities                                             Media Information      Document Information    Other: Other   Pt FOTO Qr code shoulder   10/27/2023   Attached To:   Appointment on 10/27/23 with Hugo Hooper PT

## 2023-10-27 ENCOUNTER — EVALUATION (OUTPATIENT)
Dept: PHYSICAL THERAPY | Facility: REHABILITATION | Age: 43
End: 2023-10-27
Payer: COMMERCIAL

## 2023-10-27 DIAGNOSIS — M25.511 ACUTE PAIN OF RIGHT SHOULDER: Primary | ICD-10-CM

## 2023-10-27 LAB — MISCELLANEOUS LAB TEST RESULT: NORMAL

## 2023-10-27 PROCEDURE — 97161 PT EVAL LOW COMPLEX 20 MIN: CPT

## 2023-10-27 PROCEDURE — 97110 THERAPEUTIC EXERCISES: CPT

## 2023-11-08 NOTE — PROGRESS NOTES
Daily Note     Today's date: 2023  Patient name: Haider Holloway  : 1980  MRN: [id number]  Referring provider: Gianfranco Davis*  Dx:   Encounter Diagnosis     ICD-10-CM    1. Acute pain of right shoulder  M25.511 Ambulatory Referral to Physical Therapy      2. Right rotator cuff tendinitis  M75.81 Ambulatory Referral to Physical Therapy          Start Time: 845  Stop Time: 930  Total time in clinic (min): 45 minutes    Subjective: Patient states shoulder abduction AAROM exercises continue to be most bothersome. She believes the CSI is wearing off and is starting to feel the posterior shoulder pain more. She further notes burning/numbness on the medial border of her right shoulder blade. Objective: See treatment diary below      Assessment: Patient tolerated treatment session well today with focus on shoulder gentle ROM and periscapular motor control. Patient had fairly good PROM today with slight capsular tightness at end-range, abduction continues to be the most limited. Patient reported improvement in symptoms with side-lying exercises when PT facilitated scapular mobilization. Patient was educated on the importance of scapular mechanics and control when performing HEP. Was given additional HEP to perform before next appointment. Patient reported general muscle soreness and fatigue at the end of the session secondary to strength/endurance deficits. Patient will continue to be appropriate for skilled outpatient physical therapy in order to address impairments and pain. 1:1 with Yaritza Portal, PT, DPT for entirety of treatment session. Plan: Progress treatment as tolerated.        Precautions: Anxiety, A-Fib, Cardiomyopathy, CHF, CAD    Pertinent Findings:        POC expires Unit limit Auth Expiration date PT/OT + Visit Limit?   23 BOMN 2023 12         Visit/Unit Tracking  AUTH Status:  Date 10/27 11/9        Used 1 2        Remaining  11 10           POC End Date: 12/23/2023                                                                                                             Test / Measure  10/27/23   FOTO (Predicted 74) 61   Shoulder AROM Flex and Abduction P! Shoulder MMT I/Y/Ts   3/5 P! Decrease Postural Endurance and Motor Control Poor     Access Code: [email or web address]  URL: [email or web address]  Date: 10/27/2023  Prepared by: Mart Lute    Exercises  - Supine Shoulder Flexion Extension AAROM with Dowel  - 1 x daily - 7 x weekly - 2-3 sets - 10 reps  - Supine Shoulder Abduction AAROM with Dowel  - 1 x daily - 7 x weekly - 2-3 sets - 10 reps  - Seated Shoulder Flexion Slide at Table Top with Forearm in Neutral  - 1 x daily - 7 x weekly - 3 sets - 10 reps    Appt  1 2           Manuals 10/27 11/9           Right PROM  AR           GHJ Posterior and Inferior Mobilization  AR           Scapular Mobilization in SL  AR                        Neuro Re-Ed             Shoulder AAROM with Cane Flex/Abd/ER  5" x 20 each           Side-Lying Shoulder Flex/Scap/IR/ER  20x each    Scap facilitation by PT           Towel Slides with Scap Setting                                                                 Ther Ex             UBE  4'/4'  L1           Pulley's             Supine Protraction Wall Punches             Shoulder Resisted Extension   RUE  OTB  20x                                                                                         Ther Activity                                       Gait Training                                       Modalities

## 2023-11-09 ENCOUNTER — OFFICE VISIT (OUTPATIENT)
Dept: PHYSICAL THERAPY | Facility: REHABILITATION | Age: 43
End: 2023-11-09
Payer: COMMERCIAL

## 2023-11-09 DIAGNOSIS — M25.511 ACUTE PAIN OF RIGHT SHOULDER: ICD-10-CM

## 2023-11-09 DIAGNOSIS — M75.81 RIGHT ROTATOR CUFF TENDINITIS: ICD-10-CM

## 2023-11-09 PROCEDURE — 97110 THERAPEUTIC EXERCISES: CPT

## 2023-11-09 PROCEDURE — 97140 MANUAL THERAPY 1/> REGIONS: CPT

## 2023-11-09 PROCEDURE — 97112 NEUROMUSCULAR REEDUCATION: CPT

## 2023-11-10 ENCOUNTER — ANNUAL EXAM (OUTPATIENT)
Dept: OBGYN CLINIC | Facility: CLINIC | Age: 43
End: 2023-11-10
Payer: COMMERCIAL

## 2023-11-10 VITALS
BODY MASS INDEX: 30.51 KG/M2 | SYSTOLIC BLOOD PRESSURE: 104 MMHG | DIASTOLIC BLOOD PRESSURE: 76 MMHG | HEIGHT: 62 IN | WEIGHT: 165.8 LBS

## 2023-11-10 DIAGNOSIS — Z12.31 SCREENING MAMMOGRAM FOR BREAST CANCER: ICD-10-CM

## 2023-11-10 DIAGNOSIS — Z01.419 WELL WOMAN EXAM WITH ROUTINE GYNECOLOGICAL EXAM: Primary | ICD-10-CM

## 2023-11-10 DIAGNOSIS — D25.9 UTERINE LEIOMYOMA, UNSPECIFIED LOCATION: ICD-10-CM

## 2023-11-10 PROCEDURE — S0612 ANNUAL GYNECOLOGICAL EXAMINA: HCPCS | Performed by: PHYSICIAN ASSISTANT

## 2023-11-10 RX ORDER — ESOMEPRAZOLE MAGNESIUM 40 MG/1
CAPSULE, DELAYED RELEASE ORAL
COMMUNITY
Start: 2023-03-24

## 2023-11-10 NOTE — PROGRESS NOTES
Assessment   37 y.o. [id number] presenting for annual exam.    Plan   Diagnoses and all orders for this visit:    Well woman exam with routine gynecological exam    Screening mammogram for breast cancer  -     Mammo screening bilateral w 3d & cad; Future    Uterine leiomyoma, unspecified location  -     US pelvis complete w transvaginal; Future    Other orders  -     esomeprazole (NexIUM) 40 MG capsule        Pap up to date  Mammo slip given    Colonoscopy up to date   Menorrhagia with a regular cycle- plan to obtain TVUS to assess fibroid size. RTO in 6-8 weeks to discuss management options. Perineal hygiene reviewed. Weight bearing exercises minium of 150 mins/weekly advised. Kegel exercises recommended. SBE encouraged, A yearly mammogram is recommended for breast cancer screening starting at age 36. ASCCP guidelines reviewed. Condoms encouraged with all sexual activity to prevent STI's. Gardisil vaccines recommended up to age 39. Calcium/ Vit D dietary requirements discussed. Advised to call with any issues, all concerns & questions addressed. See provided information in your after visit summary     F/U Annually and PRN    Results will be released to link bird, if abnormal will call or message to review and discuss treatment plan.     __________________________________________________________________    Subjective     Je Mclean is a 37 y.o. [id number] presenting for annual exam.     Hx postpartum cardiomyopathy, has ICD. CT abdomen/pelvis on 10/26/23 for upper abdominal pain  Incidental findings of fibroids- Probable uterine fibroids. 2.3 cm probable left ovarian dominant follicle. She does report a several year hx of heavy but regular menses. She reports menses every 28 days. Duration 5 days. Flow is heavy most days of the cycle. Requires tampon/pad change every 1-2 hours. Reports golf ball sized clots at times.     SCREENING  Last Pap: 8/2022 neg/neg  Last Mammo: 03/17/2023 birads 1  Last Colonoscopy: 10/20/2023 10 year recall      GYN  The patient's menstrual history is as follows:    ;  ; Period Cycle (Days): 28; Period Duration (Days): 5-6; Period Pattern: Regular; Menstrual Flow: Heavy; Dysmenorrhea: (!) Moderate; Dysmenorrhea Symptoms: Cramping, Nausea, Diarrhea, Headache    Sexually active: Yes - single partner - male  Contraception: tubal    Hx Abnormal pap: denies  We reviewed ASCCP guidelines for Pap testing today. Denies vaginal discharge, itching, odor, dyspareunia, pelvic pain and vulvar/vaginal symptoms      OB           Complaints: denies   Denies urgency, frequency, hematuria, leakage / change in stream, difficulty urinating. BREAST  Complaints: denies   Denies: breast lump, breast tenderness, nipple discharge, skin color change, and skin lesion(s)  Personal hx: none      Pertinent Family Hx:   Family hx of breast cancer: no  Family hx of ovarian cancer: no  Family hx of colon cancer: no      GENERAL  PMH reviewed/updated and is as below. Patient does follow with a PCP.     SOCIAL  Smoking: no  Alcohol:occasional  Drug: no  Occupation: work from home       Past Medical History:   Diagnosis Date    Allergic rhinitis     Anxiety     Atrial fibrillation (HCC)     paroxysmal    Bunion 1985    Candidal vulvovaginitis     Cardiomyopathy ([address])     Chest pain     CHF (congestive heart failure) ([address])     chronic systolic    Coronary artery disease     Fibroid     Hammertoe of right foot     Herpes simplex     Irregular heart beat     LBBB (left bundle branch block)     Normal delivery      son, 2008 daughter    Pacemaker        Past Surgical History:   Procedure Laterality Date    BUNIONECTOMY Bilateral     BUNIONECTOMY Right 2022    Procedure: Pamela Clamp;  Surgeon: Dhruv Montemayor DPM;  Location: [facility] OR;  Service: [address] [place] [address] ABDOMEN N/A 2019    Procedure: CIRCUMFERENTIAL ABDOMINOPLASTY;  Surgeon: Yadi Ovalle MD;  Location: [address] [place] [address] MAIN OR;  Service: Plastics    RECESSION GASTROC ENDOSCOPIC Right 9/29/2022    Procedure: RECESSION GASTROC ENDOSCOPIC;  Surgeon: Stuart Montemayor DPM;  Location: [facility];  Service: Podiatry    TOE OSTEOTOMY Right 9/29/2022    Procedure: 2ND HAMMERTOE REPAIR;  Surgeon: Stuart Montemayor DPM;  Location: [facility];  Service: Podiatry    TUBAL LIGATION  2009         Current Outpatient Medications:     ALPRAZolam (XANAX) 0.5 mg tablet, Take 0.5 tablets (0.25 mg total) by mouth 3 (three) times a day as needed for anxiety, Disp: 20 tablet, Rfl: 0    carvedilol (COREG) 25 mg tablet, TAKE 1 TABLET BY MOUTH TWICE A DAY, Disp: 180 tablet, Rfl: 5    esomeprazole (NexIUM) 40 MG capsule, , Disp: , Rfl:     lisinopril (ZESTRIL) 5 mg tablet, TAKE 1 TABLET BY MOUTH TWICE A DAY, Disp: 180 tablet, Rfl: 3    No Known Allergies    Social History     Socioeconomic History    Marital status: /Civil Union     Spouse name: Not on file    Number of children: Not on file    Years of education: Not on file    Highest education level: Not on file   Occupational History    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   Tobacco Use    Smoking status: Never    Smokeless tobacco: Never    Tobacco comments:     history of second hand smoke exposure as a child   Vaping Use    Vaping Use: Never used   Substance and Sexual Activity    Alcohol use:  Yes     Alcohol/week: 2.0 standard drinks of alcohol     Types: 2 Glasses of wine per week     Comment: social    Drug use: No    Sexual activity: Yes     Partners: Male     Birth control/protection: Female Sterilization   Other Topics Concern    Not on file   Social History Narrative    Lives with her  and 2 children    Works full time     Exercises 4-5 times per week    Reports a healthy/balanced diet     Social Determinants of Health     Financial Resource Strain: Not on file   Food Insecurity: Not on file Transportation Needs: Not on file   Physical Activity: Not on file   Stress: Not on file   Social Connections: Not on file   Intimate Partner Violence: Not on file   Housing Stability: Not on file       Review of Systems     ROS:  Constitutional: Negative for fatigue and unexpected weight change. Respiratory: Negative for cough and shortness of breath. Cardiovascular: Negative for chest pain and palpitations. Gastrointestinal: Negative for abdominal pain and change in bowel habits  Breasts:  Negative, other than as noted above. Genitourinary: Negative, other than as noted above. Psychiatric: Negative for mood difficulties. Objective      /76 (BP Location: Left arm, Patient Position: Sitting, Cuff Size: Standard)   Ht 5' 2" (1.575 m)   Wt 75.2 kg (165 lb 12.8 oz)   LMP 10/10/2023 (Exact Date) Comment: tubal  BMI 30.33 kg/m²     Physical Examination:    Patient appears well and is not in distress  Neck is supple without masses, no cervical or supraclavicular lymphadenopathy  Cardiovascular: regular rate and rhythm; no murmurs  Lungs: clear to auscultation bilaterally; no wheezes  Breasts are symmetrical without mass, tenderness, nipple discharge, skin changes or adenopathy. Abdomen is soft and nontender without masses. External genitals are normal without lesions or rashes. Urethral meatus and urethra are normal  Bladder is normal to palpation  Vagina is normal without discharge or bleeding. Cervix is normal without discharge or lesion. Uterus is  mobile, nontender. Uterus does feel fibroid in nature  Adnexa are normal, nontender, without palpable mass.

## 2023-11-20 ENCOUNTER — OFFICE VISIT (OUTPATIENT)
Dept: PHYSICAL THERAPY | Facility: REHABILITATION | Age: 43
End: 2023-11-20
Payer: COMMERCIAL

## 2023-11-20 DIAGNOSIS — M25.511 ACUTE PAIN OF RIGHT SHOULDER: Primary | ICD-10-CM

## 2023-11-20 DIAGNOSIS — M75.81 RIGHT ROTATOR CUFF TENDINITIS: ICD-10-CM

## 2023-11-20 PROCEDURE — 97112 NEUROMUSCULAR REEDUCATION: CPT

## 2023-11-20 PROCEDURE — 97110 THERAPEUTIC EXERCISES: CPT

## 2023-11-20 PROCEDURE — 97140 MANUAL THERAPY 1/> REGIONS: CPT

## 2023-11-20 NOTE — PROGRESS NOTES
Daily Note     Today's date: 2023  Patient name: Radha Stover  : 1980  MRN: [id number]  Referring provider: Leonid Livingston*  Dx:   Encounter Diagnosis     ICD-10-CM    1. Acute pain of right shoulder  M25.511       2. Right rotator cuff tendinitis  M75.81           Start Time: 0800  Stop Time: 0845  Total time in clinic (min): 45 minutes    Subjective: Patient states the day after her last PT session had potentially shoulder DOMs and some cramping/pain going down into the right scapula. Placed some heat on the scapula and pain relieved, Denies numbness and tingling. Patient continues to have pain and difficulty with shoulder abduction. Stated she did some cleaning over the weekend      Objective: See treatment diary below      Assessment: Patient tolerated treatment session well today with focus on shoulder gentle ROM and periscapular motor control. Patient continues to report slight discomfort and a deep stretch with passive end-range shoulder abduction and flexion. Reported tenderness to palpation along lateral aspect of R arm and supraspinatus. Patient continues to be challenged with therex secondary to periscapular weakness. Educated on post-DOMs care and expectations. May trial ice if needed or heat if cramping returns. Symptoms are most likely related to post-exercise fatigue and soreness. Patient will continue to be appropriate for skilled outpatient physical therapy in order to address impairments and pain. 1:1 with Miki Sheriff, PT, DPT for entirety of treatment session. Plan: Progress treatment as tolerated.        Precautions: Anxiety, A-Fib, Cardiomyopathy, CHF, CAD    Pertinent Findings:        POC expires Unit limit Auth Expiration date PT/OT + Visit Limit?   23 BOMN 2023 12         Visit/Unit Tracking  AUTH Status:  Date 10/27 11/9 11/20       Used 1 2 3       Remaining  11 10  9         POC End Date: 2023 Test / Measure  10/27/23   FOTO (Predicted 74) 61   Shoulder AROM Flex and Abduction P! Shoulder MMT I/Y/Ts   3/5 P! Decrease Postural Endurance and Motor Control Poor     Access Code: [email or web address]  URL: [email or web address]  Date: 10/27/2023  Prepared by: Benny Gracia    Exercises  - Supine Shoulder Flexion Extension AAROM with Dowel  - 1 x daily - 7 x weekly - 2-3 sets - 10 reps  - Supine Shoulder Abduction AAROM with Dowel  - 1 x daily - 7 x weekly - 2-3 sets - 10 reps  - Seated Shoulder Flexion Slide at Table Top with Forearm in Neutral  - 1 x daily - 7 x weekly - 3 sets - 10 reps    Appt  1 2 3    Manuals 10/27 11/9 11/20    Right PROM  AR AR    GHJ Posterior and Inferior Mobilization  AR AR    Scapular Mobilization in SL  AR     Subscap release   AR    Neuro Re-Ed       Shoulder AAROM with Cane Flex/Abd/ER  5" x 20 each #2 aw  10 ea,  No abd    Shoulder Flex/Scap/IR/ER  20x each  Sidelying  Scap facilitation by PT     Towel Slides with Scap Setting   Scap 20x    Chin Tucks   15x                         Ther Ex       UBE  4'/4'  L1 4'/4'  L1    Supine Protraction Wall Punches   #4 DB  2x10    Shoulder Resisted Extension   RUE  OTB  20x                                               Ther Activity                     Gait Training                     Modalities

## 2023-11-22 ENCOUNTER — HOSPITAL ENCOUNTER (OUTPATIENT)
Dept: RADIOLOGY | Age: 43
Discharge: HOME | End: 2023-11-22
Payer: COMMERCIAL

## 2023-11-22 DIAGNOSIS — D25.9 UTERINE LEIOMYOMA, UNSPECIFIED LOCATION: ICD-10-CM

## 2023-11-22 PROCEDURE — 76856 US EXAM PELVIC COMPLETE: CPT

## 2023-11-22 PROCEDURE — 76830 TRANSVAGINAL US NON-OB: CPT

## 2023-11-24 NOTE — ASSESSMENT & PLAN NOTE
Ongoing ongoing f/u with cardiology, continue carvedilol, lisinopril  BP well controlled 
Pacemaker in place, denies cp, palpitations  Ongoing f/u with cardiology, continue carvedilol, lisinopril 
Reinforce healthy diet, regular exercise 
Stable with prn alprazolam, pdmp reviewed and no hx of overuse/abuse  Refill sent 
Unremarkable exam of adult female  Scheduled for mammo  Flu shot administered today  Continue with annual gyn, dental, eye exams  Next year to start colon cancer screening 
Working with GI, scheduled for egd next week for evaluation, taking carafate, pantoprazole  Continue bland diet 
rx sent for scopolamine patch in advance of upcoming cruise
Attending to bill
Attending to bill

## 2023-11-27 ENCOUNTER — EVALUATION (OUTPATIENT)
Dept: PHYSICAL THERAPY | Facility: REHABILITATION | Age: 43
End: 2023-11-27
Payer: COMMERCIAL

## 2023-11-27 ENCOUNTER — REMOTE DEVICE CLINIC VISIT (OUTPATIENT)
Dept: CARDIOLOGY CLINIC | Facility: CLINIC | Age: 43
End: 2023-11-27

## 2023-11-27 DIAGNOSIS — M25.511 ACUTE PAIN OF RIGHT SHOULDER: Primary | ICD-10-CM

## 2023-11-27 DIAGNOSIS — M75.81 RIGHT ROTATOR CUFF TENDINITIS: ICD-10-CM

## 2023-11-27 DIAGNOSIS — Z95.810 PRESENCE OF AUTOMATIC CARDIOVERTER/DEFIBRILLATOR (AICD): Primary | ICD-10-CM

## 2023-11-27 PROCEDURE — 97140 MANUAL THERAPY 1/> REGIONS: CPT

## 2023-11-27 PROCEDURE — RECHECK: Performed by: INTERNAL MEDICINE

## 2023-11-27 PROCEDURE — 97110 THERAPEUTIC EXERCISES: CPT

## 2023-11-27 PROCEDURE — 97112 NEUROMUSCULAR REEDUCATION: CPT

## 2023-11-27 PROCEDURE — 97164 PT RE-EVAL EST PLAN CARE: CPT

## 2023-11-27 NOTE — PROGRESS NOTES
PT Re-Evaluation     Today's date: 2023  Patient name: Tiffanie Montana  : 1980  MRN: [id number]  Referring provider: ETHAN Cruz*  Dx:   Encounter Diagnosis     ICD-10-CM    1. Acute pain of right shoulder  M25.511       2. Right rotator cuff tendinitis  M75.81           Start Time: 0800  Stop Time: 0845  Total time in clinic (min): 45 minutes    Assessment  Assessment details: Tiffanie Montana is a pleasant 37 y.o. female who has been participating in outpatient physical therapy over the last month with a [facility] Physical exam continues to be most consistent with mechanical derangement of right shoulder potentially causing impingement-related symptoms. Upon further clinical re-testing, patient has demonstrated improvement in her gross shoulder AROM and PROM, continues to report difficulty and pain in mid-range. I further evaluated her scapula due to reporting of medial pain, she did have increase soft tissue restriction and tenderness to palpation along the medial-inferior border of right scapula when compared to her left. Patient still presents with right shoulder deficits such as poor periscapular strength, poor scapular neuromuscular control, postural deficits, and decrease muscular endurance. Patient responded well to scapular mobilization with movement today, she was able to after perform side-lying shoulder AROM independently with a reduction in hesitation and pain. These aforementioned deficits and impairments result in pain with movements, difficulty to perform lifting and reaching tasks, and decrease sleep quality due to pain when laying on right side. Pt was provided with an updated basic HEP focused on gentle AAROM which will be reviewed in the upcoming session. Pt able to demonstrate HEP with good technique and no pain.  Educated pt to stop any exercises causing pain increase, pt verbalizes understanding. Pt was educated on anatomy and physiology of diagnosis and demonstrated verbal understanding. Pt would benefit from skilled OP physical therapy to address these, and the below impairments in order to optimize outcomes and promote return to functional baseline. Problem List:  1) Mechanical Derangement of RUE  2) Decrease Active Shoulder Mobility  3) Decrease Periscapular Motor Control and Endurance     Comparable signs:  1) OH and Reaching Movements  2)  Side-Lying on R side        Patient verbalized understanding of POC. Please contact me if you have any questions or recommendations. Thank you for the referral and the opportunity to share in Syndia's care. Impairments: abnormal coordination, abnormal muscle firing, abnormal or restricted ROM, abnormal movement, activity intolerance, impaired physical strength, lacks appropriate home exercise program, pain with function, scapular dyskinesis, weight-bearing intolerance, poor posture  and poor body mechanics    Symptom irritability: moderateUnderstanding of Dx/Px/POC: good   Prognosis: good    Goals  Short Term Goals: In 2-4 weeks, the patient will:  1. Pt will report at least a 25% reduction in subjective pain complaints/symptoms to better manage ADLs and household chores. MET  2. Pt will have a 5-10 degree improvement in her shoulder AROM PARTIALLY MET  3. Pt independent with initial HEP, rationale, technique and frequency, for ROM and pain control. MET  4. Pt will have improvement in her periscapular motor control and postural endurance NOT MET      Long Term Goals: ALL NOT MET  In 8+ weeks, the patient will:  1. PT will have WNL shoulder active and passive ROM with no pain at end-range  2. FOTO to greater than predicted value. 3. Independent with comprehensive HEP upon discharge. 4. Pt will be able to perform ADLs, iADLS, and household duties with minimal restriction indicating return to PLOF.   5. Pt independent with rationale, technique and plan for performance of advanced HEP to ensure independent self-management of symptoms upon discharge. 6. Pt will have no pain with reaching OH, lifting, and right-side sleeping  7. Pt will have 5/5 periscapular muscle strength    Plan  Patient would benefit from: skilled physical therapy and PT eval  Referral necessary: No  Planned therapy interventions: activity modification, ADL retraining, joint mobilization, manual therapy, balance/weight bearing training, behavior modification, body mechanics training, neuromuscular re-education, patient education, postural training, self care, strengthening, stretching, therapeutic activities, therapeutic exercise, therapeutic training, transfer training, IADL retraining, home exercise program, graded motor, graded activity, graded exercise, flexibility, functional ROM exercises and community reintegration  Frequency: 1x week  Plan of Care beginning date: 2023  Plan of Care expiration date: 2024  Treatment plan discussed with: patient      Subjective Evaluation    History of Present Illness  Mechanism of injury: Patient notes overall improvement in her right shoulder pain. She notes increasing discomfort since CSI is wearing off. Patient states improvement with reaching and grabbing items. Laying on her right side continues to be slightly bothersome. At times, she states she gets this strange intermittent sensation along the medial border of her scapula.  "It almost feels like something is there."           Not a recurrent problem   Quality of life: good    Patient Goals  Patient goals for therapy: increased strength, independence with ADLs/IADLs, increased motion, improved balance and decreased pain    Pain  Current pain ratin  At best pain ratin  At worst pain rating: 3  Quality: dull ache, discomfort and squeezing  Relieving factors: change in position and medications  Aggravating factors: overhead activity and lifting    Social Support    Employment status: working  Treatments  Previous treatment: injection treatment and medication  Current treatment: physical therapy      Objective      Strength and ROM evaluated B from a regional biomechanical perspective and values relevant to this episode recorded in table below    Active ROM: Goniometric measurement revealed the following findings. Shoulder ROM Right  10/27/23 Right  11/27/23   Flexion WNL WNL   Abduction 170 P! 170 P! BTH Occiput P! Improved - T2   BTB Unable P! Lumbar P! Passive ROM: Goniometric measurement revealed the following findings. Shoulder ROM Right  10/27/23 Right   11/27/23   Flexion 135 P! 175 P! Abduction 100 P! 170 P! ER 35 @ 90  80 @ 90   IR WNL WNL       Strength: MMT revealed the following findings. Joint Motion Right   10/27/23 Right  11/27/23   Sh. Flexion 4/5 4+/5   Sh. Abduction 5/5 5/5   Sh. ER 5/5 5/5   Sh. IR 5/5 5/5   Elbow Flex 5/5 5/5   Elbow ext 5/5 5/5   Lower Trap 3/5 P! 3+/5 P! Mid Trap 3/5 P! 3+/5 P! Precautions: Anxiety, A-Fib, Cardiomyopathy, CHF, CAD    Pertinent Findings:        POC expires Unit limit Auth Expiration date PT/OT + Visit Limit?   12/23/23 BOMN 11/27/2023 12         Visit/Unit Tracking  AUTH Status:  Date 10/27 11/9 11/20 11/27      Used 1 2 3 4      Remaining  11 10  9 8        POC End Date: 12/23/2023                                                                                                             Test / Measure  10/27/23 11/27/23   FOTO (Predicted 74) 61 62   Shoulder AROM Flex and Abduction P! Flex and Abduction end-range P! Shoulder MMT I/Y/Ts   3/5 P! 3+/5 P! Decrease Postural Endurance and Motor Control Poor Improving      Access Code: [email or web address]  URL: [email or web address]  Date: 10/27/2023  Prepared by: Hugo Hooper    Exercises  - Supine Shoulder Flexion Extension AAROM with Dowel  - 1 x daily - 7 x weekly - 2-3 sets - 10 reps  - Supine Shoulder Abduction AAROM with Dowel  - 1 x daily - 7 x weekly - 2-3 sets - 10 reps  - Seated Shoulder Flexion Slide at Table Top with Forearm in Neutral  - 1 x daily - 7 x weekly - 3 sets - 10 reps    Appt  1 2 3 4   Manuals 10/27 11/9 11/20 11/27   Right PROM  AR AR AR   GHJ Posterior and Inferior Mobilization  AR AR    Scapular Mobilization in SL  AR  AR   Subscap release   AR    Neuro Re-Ed       Shoulder AAROM with Cane Flex/Abd/ER  5" x 20 each #2 aw  10 ea,  No abd    SideLying Shoulder AROM Horizontal Abd/Flexion    + Scap Mobilization  10x eac direction   Shoulder Flex/Scap/IR/ER  20x each  Sidelying  Scap facilitation by PT     Towel Slides with Scap Setting   Scap 20x    Chin Tucks   15x                         Ther Ex       UBE  4'/4'  L1 4'/4'  L1 4'/4'  L1   Supine Protraction Wall Punches   #4 DB  2x10    Shoulder Resisted Extension   RUE  OTB  20x                                               Ther Activity                     Gait Training                     Modalities

## 2023-11-28 ENCOUNTER — TELEPHONE (OUTPATIENT)
Dept: OBGYN CLINIC | Facility: CLINIC | Age: 43
End: 2023-11-28

## 2023-11-28 NOTE — TELEPHONE ENCOUNTER
----- Message from Eric Law PA-C sent at 11/28/2023  7:01 AM EST -----  Please call patient and let her know I reviewed her ultrasound - there is a 2.5 cm fibroid and some possible evidence of adenomyosis which may explain the heaviness she has been experiencing with her menses. She has a follow up with me to discuss management options on 1/5/2024. I would like her to keep this appointment. We also should plan to perform an endometrial biopsy to insure tissue is normal given her bleeding pattern.  We can perform this at the time of her visit on 1/5/2024

## 2023-11-28 NOTE — PROGRESS NOTES
Results for orders placed or performed in visit on 11/27/23   Cardiac EP device report    Narrative    MDT/CRT-D (DDD MODE)/ NOT MRI CONDITIONAL  CARELINK TRANSMISSION: N/B BATTERY CHECKBATTERY VOLTAGE NEARING DANTE. WILL SCHEDULE MONTHLY BATTERY CHECKS. (2 MONS) AP <1%  99%. ALL AVAILABLE LEAD PARAMETERS WITHIN NORMAL LIMITS. NO SIGNIFICANT HIGH RATE EPISODES. VENTRICULAR SENSE EPISODES NOTED. OPTI-VOL WITHIN NORMAL LIMITS. NORMAL DEVICE FUNCTION. ---HEIN

## 2023-12-04 ENCOUNTER — OFFICE VISIT (OUTPATIENT)
Dept: PHYSICAL THERAPY | Facility: REHABILITATION | Age: 43
End: 2023-12-04
Payer: COMMERCIAL

## 2023-12-04 DIAGNOSIS — M25.511 ACUTE PAIN OF RIGHT SHOULDER: Primary | ICD-10-CM

## 2023-12-04 DIAGNOSIS — M75.81 RIGHT ROTATOR CUFF TENDINITIS: ICD-10-CM

## 2023-12-04 PROCEDURE — 97140 MANUAL THERAPY 1/> REGIONS: CPT

## 2023-12-04 PROCEDURE — 97110 THERAPEUTIC EXERCISES: CPT

## 2023-12-04 PROCEDURE — 97112 NEUROMUSCULAR REEDUCATION: CPT

## 2023-12-04 NOTE — PROGRESS NOTES
Discharge Note     Today's date: 2023  Patient name: Tiffanie Montana  : 1980  MRN: [id number]  Referring provider: ETHAN Cruz*  Dx:   Encounter Diagnosis     ICD-10-CM    1. Acute pain of right shoulder  M25.511       2. Right rotator cuff tendinitis  M75.81           Start Time: 0800  Stop Time: 0845  Total time in clinic (min): 45 minutes    Subjective: Patient overall presents today feeling well with minimal to no pain. She was able to perform [place] decorating with no issues and no pain after. Patient wishes to hold off on PT for now and continue with HEP. Objective: See treatment diary below    Pain:  Current: 0/10  Best: 0/10  Worst: 1/10      Assessment: Patient tolerated last treatment session very well today with focus on reviewing HEP. Patient has met all goals at this time and is pleased with her rehab. Today she had WNL PROM and reporting minimal pain at end-range positions, less compared to initial evaluation. She responded well to light loaded RTC exercises, recommended to continue to perform at home to maintain progress and progress as needed. Due to patient feeling better and expresses no further concern, patient will be discharged from physical therapy with HEP. If patient symptoms return or worsen, may return to caseload within authorized period or present with new script for initial evaluation - pt verbally understanding.        Plan: Discharge     Precautions: Anxiety, A-Fib, Cardiomyopathy, CHF, CAD    Pertinent Findings:        POC expires Unit limit Auth Expiration date PT/OT + Visit Limit?   23 BOMN 2023 12         Visit/Unit Tracking  AUTH Status:  Date 10/27 11/9 11/20 12/4*  Auth update      Used 1 2 3 5      Remaining  11 10  9 11/12        POC End Date: 2023 Test / Measure  10/27/23 11/27/23   FOTO (Predicted 74) 61 62   Shoulder AROM Flex and Abduction P! Flex and Abduction end-range P! Shoulder MMT I/Y/Ts   3/5 P! 3+/5 P! Decrease Postural Endurance and Motor Control Poor Improving      Access Code: [email or web address]  URL: [email or web address]  Date: 10/27/2023  Prepared by: Altamese Duster    Exercises  - Supine Shoulder Flexion Extension AAROM with Dowel  - 1 x daily - 7 x weekly - 2-3 sets - 10 reps  - Supine Shoulder Abduction AAROM with Dowel  - 1 x daily - 7 x weekly - 2-3 sets - 10 reps  - Seated Shoulder Flexion Slide at Table Top with Forearm in Neutral  - 1 x daily - 7 x weekly - 3 sets - 10 reps    Appt  1 2 3 4   Manuals 10/27 11/9 11/20 12/4   Right PROM  AR AR AR   GHJ Posterior and Inferior Mobilization  AR AR AR   Scapular Mobilization in SL  AR     Subscap release   AR AR   Neuro Re-Ed       Shoulder AAROM with Cane Flex/Abd/ER  5" x 20 each #2 aw  10 ea,  No abd #2.5  10x ea    Shoulder Flex/Scap/IR/ER  20x each  Sidelying  Scap facilitation by PT  #1  20x eac sidelying   Towel Slides with Scap Setting   Scap 20x    Chin Tucks   15x                         Ther Ex       UBE  4'/4'  L1 4'/4'  L1 4'/4'  L1   Supine Protraction Wall Punches   #4 DB  2x10 #1 DB  2x10   Shoulder Resisted Extension   RUE  OTB  20x                                               Ther Activity                     Gait Training                     Modalities

## 2023-12-11 ENCOUNTER — APPOINTMENT (OUTPATIENT)
Dept: PHYSICAL THERAPY | Facility: REHABILITATION | Age: 43
End: 2023-12-11
Payer: COMMERCIAL

## 2023-12-18 ENCOUNTER — APPOINTMENT (OUTPATIENT)
Dept: PHYSICAL THERAPY | Facility: REHABILITATION | Age: 43
End: 2023-12-18
Payer: COMMERCIAL

## 2024-01-05 ENCOUNTER — OFFICE VISIT (OUTPATIENT)
Dept: OBGYN CLINIC | Facility: CLINIC | Age: 44
End: 2024-01-05
Payer: COMMERCIAL

## 2024-01-05 VITALS — SYSTOLIC BLOOD PRESSURE: 100 MMHG | WEIGHT: 172 LBS | BODY MASS INDEX: 31.46 KG/M2 | DIASTOLIC BLOOD PRESSURE: 70 MMHG

## 2024-01-05 DIAGNOSIS — N93.9 ABNORMAL VAGINAL BLEEDING: Primary | ICD-10-CM

## 2024-01-05 DIAGNOSIS — Z32.02 NEGATIVE PREGNANCY TEST: ICD-10-CM

## 2024-01-05 LAB — SL AMB POCT URINE HCG: NORMAL

## 2024-01-05 PROCEDURE — 88305 TISSUE EXAM BY PATHOLOGIST: CPT | Performed by: PATHOLOGY

## 2024-01-05 PROCEDURE — 81025 URINE PREGNANCY TEST: CPT | Performed by: PHYSICIAN ASSISTANT

## 2024-01-05 PROCEDURE — 58100 BIOPSY OF UTERUS LINING: CPT | Performed by: PHYSICIAN ASSISTANT

## 2024-01-05 PROCEDURE — 99213 OFFICE O/P EST LOW 20 MIN: CPT | Performed by: PHYSICIAN ASSISTANT

## 2024-01-05 NOTE — PROGRESS NOTES
Endometrial biopsy    Date/Time: 1/5/2024 8:20 AM    Performed by: Lidia Bird PA-C  Authorized by: Lidia Bird PA-C  Universal Protocol:  Procedure performed by: (Dr. Dulce Briceno)  Consent: Verbal consent obtained.  Risks and benefits: risks, benefits and alternatives were discussed  Consent given by: patient  Patient understanding: patient states understanding of the procedure being performed    Indication:     Indications: Other disorder of menstruation and other abnormal bleeding from female genital tract      Indications comment:  AUB  Pre-procedure:     Premeds:  Acetaminophen  Procedure:     Procedure: endometrial biopsy with Pipelle      A bivalve speculum was placed in the vagina: yes      Cervix cleaned and prepped: yes      The cervix was dilated: yes (os finder utliized to establish cervical canal)      Uterus sound depth (cm):  7    Specimen collected: specimen collected and sent to pathology      Patient tolerated procedure well with no complications: yes    Findings:     Uterus size:  Non-gravid  Comments:     Procedure comments:  Attempted to sound uterus with pipelle without success. Os finder utilized by myself and then Dr. Briceno to establish cervical canal. Pipelle was then successfully passed; sounded uterus to 7 cm. Two successful passes were obtained

## 2024-01-05 NOTE — PROGRESS NOTES
Assessment/Plan:    1. Abnormal vaginal bleeding  - see separate note for EMB procedure  -After discussion patient would like to consider surgical intervention.  Reviewed she is a more complex case given her medical history will need to determine if general GYN versus GYNONC would be best option for surgical intervention.  Will await results and then plan follow-up accordingly.  In the meantime encourage patient to consider Mirena IUD as this is a less invasive option and may considerably help her bleeding without having to undergo surgical intervention.  - Tissue Exam  - POCT urine HCG  - Endometrial biopsy    2. Negative pregnancy test  - POCT urine HCG      Subjective:      Patient ID: Tara Marquez is a 43 y.o. female.    Patient presents to the office today for a follow-up visit and EMB.  At her annual exam she reported a several year history of heavy and crampy menstrual cycles.  Recently menses have started to become slightly more irregular.  Had 2 episodes of bleeding in the month of December.  Typically menstrual cycles were every 28 days duration was about 5 days.  She reports flow is heavy most days of the cycle.  She requires tampon and pad change every 1-2 hours and reports golf ball size clots throughout the duration of her bleeding.    She did complete a transvaginal ultrasound on 11/22/2023 which showed evidence of a 2.5 cm fibroid and evidence of adenomyosis.  Endometrial lining was 4 mm in thickness.  A 4.1 cm simple left ovarian cyst was noted as well.    Her past medical history is significant for dilated cardiomyopathy which is diagnosed postpartum after her last child.  She additionally has a known history of paroxysmal atrial fibrillation, mitral valve regurgitation, left bundle dilcia block.  She does have an ICD in place.  She sees cardiology regularly.  Her most recent echo yielded an EF of 50% which is an improvement from what her EF was years ago and has been stable per  cardiology.    Prior to EMB today we reviewed various management options for her irregular bleeding patterns.  We reviewed given her cardiac history she is not a great candidate for estrogen-containing OCPs.  Could consider progesterone only pills but may not have as great an effect on improving her bleeding pattern.  We then reviewed option of Depo-Provera or Nexplanon for menstrual control.  Lastly we reviewed the benefit of a Mirena IUD.  Reviewed minimal systemic hormone with this option as well as endometrial protection.  Reviewed little to no menses are likely with this IUD in place.  Reviewed Mirena IUD could bridge patient until menopause.  Reviewed this is a lower risk options and surgical intervention.    We then briefly reviewed surgical options including ablation versus hysterectomy.  Reviewed ablation may not be as an effective at controlling her bleeding cramping due to adenomyosis appearance of uterus but could certainly discuss this with a physician.  We then reviewed the option of hysterectomy reviewed this is a more invasive option.  Given her medical history she is a more complex surgical candidate.  Reviewed briefly anesthesia risks, possibility of complications as well as longer recovery with this option.        The following portions of the patient's history were reviewed and updated as appropriate: allergies, current medications, past family history, past medical history, past social history, past surgical history, and problem list.    Review of Systems      Objective:      /70 (BP Location: Left arm, Patient Position: Sitting, Cuff Size: Large)   Wt 78 kg (172 lb)   LMP 12/10/2023 (Exact Date)   BMI 31.46 kg/m²          Physical Exam  Vitals reviewed.   Constitutional:       Appearance: Normal appearance.   HENT:      Head: Normocephalic and atraumatic.   Pulmonary:      Effort: Pulmonary effort is normal.   Abdominal:      Palpations: There is no mass.      Tenderness: There is no  abdominal tenderness. There is no guarding.   Genitourinary:     General: Normal vulva.      Exam position: Lithotomy position.      Labia:         Right: No rash, tenderness or lesion.         Left: No rash, tenderness or lesion.       Vagina: No vaginal discharge or bleeding.      Cervix: No cervical motion tenderness, discharge, lesion or cervical bleeding.   Neurological:      Mental Status: She is alert.   Psychiatric:         Mood and Affect: Mood normal.         Behavior: Behavior normal.         Thought Content: Thought content normal.         Judgment: Judgment normal.

## 2024-01-10 PROCEDURE — 88305 TISSUE EXAM BY PATHOLOGIST: CPT | Performed by: PATHOLOGY

## 2024-01-11 ENCOUNTER — TELEPHONE (OUTPATIENT)
Dept: OBGYN CLINIC | Facility: MEDICAL CENTER | Age: 44
End: 2024-01-11

## 2024-01-11 NOTE — TELEPHONE ENCOUNTER
Phone call placed the patient to review normal/benign EMB results.    Advised patient that I did discuss her case with  yesterday.  Reviewed her cardiac history does put her in a higher risk category for surgery thus putting her in a higher risk category for complications.  Would recommend medical intervention as patient is agreeable to this.  Reviewed option of Mirena IUD patient extensively.  Reviewed benefits of endometrial protection.  Also reviewed the option of Depo-Provera which may help with her pain and bleeding significantly and avoid unnecessary surgery.  Reviewed with of these options but patient at significantly lower risk for complications.    Reviewed the possibility of insurance denial for surgery if patient has not tried an alternative method first.  We reviewed the option of trialing Depo or Mirena for a short period of time for the purpose of bridging patient to surgery and also trialing a less invasive option.  Reviewed if patient has positive results with 1 of these methods reviewed he may be able to avoid surgery altogether.    Patient verbalized understanding risk associated with surgery   And acknowledges the benefit of trying a minimally invasive option first.  She states she still is not sure she wants to try aan alternative option.  She would prefer to just have surgery.  Advised she will need clearance through her PCP and through cardiology    Reviewed option of meeting with a physician to discuss the possibility of surgery further determine if a candidate for hysterectomy with general GYN.  Patient would prefer to discuss options and surgery further with the physician.  She used to see Dr. Rodas in the office.  If able to see Dr. Rodas she is willing to travel to [place] for a consult/discussion.  Will send a message to Dr. Rodas

## 2024-01-12 ENCOUNTER — IN-CLINIC DEVICE VISIT (OUTPATIENT)
Dept: CARDIOLOGY CLINIC | Facility: CLINIC | Age: 44
End: 2024-01-12
Payer: COMMERCIAL

## 2024-01-12 DIAGNOSIS — Z95.810 PRESENCE OF IMPLANTABLE CARDIOVERTER-DEFIBRILLATOR (ICD): Primary | ICD-10-CM

## 2024-01-12 PROCEDURE — 93283 PRGRMG EVAL IMPLANTABLE DFB: CPT | Performed by: INTERNAL MEDICINE

## 2024-01-12 NOTE — PROGRESS NOTES
Results for orders placed or performed in visit on 01/12/24   Cardiac EP device report    Narrative    MDT/CRT-D (DDD MODE)/ NOT MRI CONDITIONAL  DEVICE INTERROGATED IN THE [place] OFFICE. BATTERY VOLTAGE NEARING DANTE. (1 MONTH)  WILL SCHEDULE MONTHLY BATTERY CHECKS.  AP<0.1%, BVP 98.5% (TOTAL -98.5%+VSR PACE-1.3%)  NO SIGNIFICANT HIGH RATE EPISODES. 7 NEW V SENSE EPISODES.  PT TAKES COREG. EF 50% (ECHO 2/2022) NO PROGRAMMING CHANGES MADE TO DEVICE PARAMETERS. OPTI-VOL WITHIN NORMAL LIMITS. NORMAL DEVICE FUNCTION. PAS/GV

## 2024-02-12 ENCOUNTER — REMOTE DEVICE CLINIC VISIT (OUTPATIENT)
Dept: CARDIOLOGY CLINIC | Facility: CLINIC | Age: 44
End: 2024-02-12

## 2024-02-12 DIAGNOSIS — Z95.810 AICD (AUTOMATIC CARDIOVERTER/DEFIBRILLATOR) PRESENT: Primary | ICD-10-CM

## 2024-02-12 PROCEDURE — RECHECK: Performed by: INTERNAL MEDICINE

## 2024-02-12 NOTE — PROGRESS NOTES
Results for orders placed or performed in visit on 02/12/24   Cardiac EP device report    Narrative    MDT/CRT-D (DDD MODE)/ NOT MRI CONDITIONAL  CARELINK TRANSMISSION: BATTERY VOLTAGE NEARING DANTE-1 MON. WILL SCHEDULE MONTHLY BATTERY CHECKS. AP 0% CRT PACING (BIV) 10% (LV) 89%. ALL AVAILABLE LEAD PARAMETERS WITHIN NORMAL LIMITS. NO SIGNIFICANT HIGH RATE EPISODES. VENT SENSING MARKERS NOTED. OPTI-VOL WITHIN NORMAL LIMITS. NORMAL DEVICE FUNCTION. NC

## 2024-02-19 ENCOUNTER — OFFICE VISIT (OUTPATIENT)
Age: 44
End: 2024-02-19
Payer: COMMERCIAL

## 2024-02-19 VITALS
BODY MASS INDEX: 33.91 KG/M2 | DIASTOLIC BLOOD PRESSURE: 62 MMHG | SYSTOLIC BLOOD PRESSURE: 112 MMHG | HEIGHT: 61 IN | WEIGHT: 179.6 LBS

## 2024-02-19 DIAGNOSIS — R10.2 PELVIC PAIN: ICD-10-CM

## 2024-02-19 DIAGNOSIS — N80.03 ADENOMYOSIS OF THE UTERUS: Primary | ICD-10-CM

## 2024-02-19 DIAGNOSIS — N92.0 MENORRHAGIA WITH REGULAR CYCLE: ICD-10-CM

## 2024-02-19 PROCEDURE — 99214 OFFICE O/P EST MOD 30 MIN: CPT | Performed by: OBSTETRICS & GYNECOLOGY

## 2024-02-19 NOTE — PROGRESS NOTES
"Assessment/Plan:    Adenomyosis of the uterus  Pelvic pain  Menorrhagia with regular cycle        Discussed that definitive surgical management of her pelvic pain/bleeding/adenomyosis is a very reasonable request.   She has upcoming appointment with cardiology for consideration of surgical risk - as this is major surgery that will take a few hours.       We also discussed non surgical management such as a Mirena IUD.   I do not think she is a good ablation candidate due to the adenomyosis.     Discussed pathophysiology/anatomy of condition.   We will follow up pending her upcoming cardiology consult.     Subjective:      Patient ID: Tara Marquez is a 43 y.o. female.    Tara presents for consult to discuss possible hysterectomy.     She has had longstanding heavy periods, but is more bothered by the worsening pelvic pain which has been bordering on unbearable.  Has had workup both with GI and GYN.   Pelvic US very suggestive of adenomyosis, also had benign endometrial biopsy.     Prior gyn notes reviewed prior to appointment.   Complex cardiac history - but overall has been stable.         The following portions of the patient's history were reviewed and updated as appropriate: allergies, current medications, and problem list.    Review of Systems      Objective:      /62 (BP Location: Right arm, Patient Position: Sitting, Cuff Size: Large)   Ht 5' 1\" (1.549 m)   Wt 81.5 kg (179 lb 9.6 oz)   LMP 02/10/2024 (Exact Date)   BMI 33.94 kg/m²          Physical Exam  Vitals reviewed.   Constitutional:       Appearance: Normal appearance.   Abdominal:      General: Abdomen is flat.      Palpations: Abdomen is soft.      Comments: S/P abdominoplasty   Neurological:      Mental Status: She is alert and oriented to person, place, and time.   Psychiatric:         Mood and Affect: Mood normal.         Behavior: Behavior normal.           "

## 2024-02-21 PROBLEM — Z13.6 SCREENING FOR HEART DISEASE: Status: RESOLVED | Noted: 2019-02-19 | Resolved: 2024-02-21

## 2024-02-22 ENCOUNTER — RA CDI HCC (OUTPATIENT)
Dept: OTHER | Facility: HOSPITAL | Age: 44
End: 2024-02-22

## 2024-03-07 ENCOUNTER — TELEPHONE (OUTPATIENT)
Dept: CARDIOLOGY CLINIC | Facility: CLINIC | Age: 44
End: 2024-03-07

## 2024-03-07 ENCOUNTER — OFFICE VISIT (OUTPATIENT)
Dept: CARDIOLOGY CLINIC | Facility: CLINIC | Age: 44
End: 2024-03-07
Payer: COMMERCIAL

## 2024-03-07 ENCOUNTER — PREP FOR PROCEDURE (OUTPATIENT)
Dept: CARDIOLOGY CLINIC | Facility: CLINIC | Age: 44
End: 2024-03-07

## 2024-03-07 VITALS
BODY MASS INDEX: 34.2 KG/M2 | HEART RATE: 85 BPM | WEIGHT: 181 LBS | OXYGEN SATURATION: 99 % | SYSTOLIC BLOOD PRESSURE: 112 MMHG | DIASTOLIC BLOOD PRESSURE: 60 MMHG

## 2024-03-07 DIAGNOSIS — I44.7 LEFT BUNDLE-BRANCH BLOCK: ICD-10-CM

## 2024-03-07 DIAGNOSIS — I48.0 PAROXYSMAL ATRIAL FIBRILLATION (HCC): ICD-10-CM

## 2024-03-07 DIAGNOSIS — I42.8 NONISCHEMIC CARDIOMYOPATHY (HCC): Primary | ICD-10-CM

## 2024-03-07 DIAGNOSIS — Z01.810 PREOP CARDIOVASCULAR EXAM: ICD-10-CM

## 2024-03-07 PROCEDURE — 99214 OFFICE O/P EST MOD 30 MIN: CPT | Performed by: INTERNAL MEDICINE

## 2024-03-07 PROCEDURE — 93000 ELECTROCARDIOGRAM COMPLETE: CPT | Performed by: INTERNAL MEDICINE

## 2024-03-07 NOTE — TELEPHONE ENCOUNTER
Yes sPer CBC online precert resource list and Availity, BIV ICD gen change/32529, does not require authorization.  Per EPIC RTE registration, this patient has outpatient benefits.  This is a valid and billable code.he doesn't need an auth for this

## 2024-03-07 NOTE — TELEPHONE ENCOUNTER
----- Message from Dorinda Rodriguez PA-C sent at 3/7/2024  1:23 PM EST -----  This patient needs a more urgent [employer] biv ICD gen change next week. She just saw Dr. Mattson and needs upcoming surgery, but the gen change needs to be done sooner. This is per Dr. Benavides, who he reviewed the patient with.     I know schedules are tight, but Dr. Benavides did specify for this to be next week. Let me know if you need anything. Thanks!

## 2024-03-07 NOTE — TELEPHONE ENCOUNTER
Patient scheduled for BIV ICD GEN CHAGNE at [facility] on 3/14/2024  with Dr Moore.    Patient aware of general instructions, labs order.     Meds holds:   Lisinopril Hold the day prior of the procedure and the morning of.

## 2024-03-07 NOTE — LETTER
March 7, 2024     Erika Roads MD  [address] [address]    Patient: Tara Marquez   YOB: 1980   Date of Visit: 3/7/2024       Dear Dr. Rodas:    Thank you for referring Tara Marquez to me for evaluation. Below are my notes for this consultation.    If you have questions, please do not hesitate to call me. I look forward to following your patient along with you.         Sincerely,        Jamal Mattson MD        CC: No Recipients    Jamal Mattson MD  3/7/2024  2:43 PM  Incomplete                                             Cardiology Follow Up    Tara Marquez  1980  [id number]  [facility] [place] [address] [place] [id number] [phone]    1. Nonischemic cardiomyopathy (HCC)  POCT ECG    Echo complete w/ contrast if indicated      2. Paroxysmal atrial fibrillation (HCC)        3. Left bundle-branch block        4. Preop cardiovascular exam              Discussion/Summary: She is scheduled for a repeat ECHO on 3/22/2024. Her BiV ICD battery needs replacement and this will be scheduled for next week.  If her echo is OV, she is cleared to have her hysterectomy the week of 3/25/2024.      Interval History: She has not had any cardiac problems since her last OV. She is active and denies CP, SOB, LE edema.  She has a LBBB / non ischemic cardiomyopathy. EF has been 50% with medications and biV pacing.  Her weight is up from 175 to 181 lbs.    /60    Her BiV ICD is reaching EOL and needs generator change.    Last ECHO was in 2/2022.    She will need hysterectomy in the next few weeks.        Patient Active Problem List   Diagnosis   • Anxiety   • Cardiomyopathy (HCC)   • Left bundle-branch block   • Menorrhagia with regular cycle   • Mitral regurgitation   • Chest pain on breathing   • Annual physical exam   • Lipodystrophy   • Class 1 obesity due to excess calories with serious comorbidity and  body mass index (BMI) of 34.0 to 34.9 in adult   • Paroxysmal atrial fibrillation (HCC)   • Night sweats   • Generalized abdominal pain   • Pre-op examination   • Pacemaker   • History of motion sickness   • Gastritis   • Adenomyosis of the uterus   • Pelvic pain   • Preop cardiovascular exam     Past Medical History:   Diagnosis Date   • Allergic rhinitis    • Anxiety    • Atrial fibrillation (HCC)     paroxysmal   • Bunion 1985   • Candidal vulvovaginitis    • Cardiomyopathy (HCC)    • Chest pain    • CHF (congestive heart failure) (HCC)     chronic systolic   • Coronary artery disease    • Fibroid    • Hammertoe of right foot    • Herpes simplex    • Irregular heart beat    • LBBB (left bundle branch block)    • Normal delivery     2005 son, 2008 daughter   • Pacemaker      Social History     Socioeconomic History   • Marital status: /Civil Union     Spouse name: Not on file   • Number of children: Not on file   • Years of education: Not on file   • Highest education level: Not on file   Occupational History   • 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   Tobacco Use   • Smoking status: Never   • Smokeless tobacco: Never   • Tobacco comments:     history of second hand smoke exposure as a child   Vaping Use   • Vaping status: Never Used   Substance and Sexual Activity   • Alcohol use: Yes     Alcohol/week: 2.0 standard drinks of alcohol     Types: 2 Glasses of wine per week     Comment: social   • Drug use: No   • Sexual activity: Yes     Partners: Male     Birth control/protection: Female Sterilization   Other Topics Concern   • Not on file   Social History Narrative    Lives with her  and 2 children    Works full time     Exercises 4-5 times per week    Reports a healthy/balanced diet     Social Determinants of Health     Financial Resource Strain: Not on file   Food Insecurity: Not on file   Transportation Needs: Not on file   Physical Activity: Not on file   Stress: Not on file   Social Connections: Not  on file   Intimate Partner Violence: Not on file   Housing Stability: Not on file      Family History   Problem Relation Age of Onset   • Hypertension Mother    • Diabetes Father    • Hypertension Father    • Heart disease Father    • Hyperlipidemia Father    • No Known Problems Brother    • No Known Problems Brother    • No Known Problems Daughter    • No Known Problems Son    • No Known Problems Maternal Grandmother    • No Known Problems Maternal Grandfather    • No Known Problems Paternal Grandmother    • No Known Problems Paternal Grandfather    • No Known Problems Maternal Aunt    • No Known Problems Paternal Aunt    • No Known Problems Paternal Aunt    • No Known Problems Paternal Aunt    • Breast cancer Neg Hx    • Colon cancer Neg Hx    • Ovarian cancer Neg Hx      Past Surgical History:   Procedure Laterality Date   • BUNIONECTOMY Bilateral    • BUNIONECTOMY Right 9/29/2022    Procedure: LAPIDUS BUNIONECTOMY;  Surgeon: Brandon Montemayor DPM;  [facility];  Service: Podiatry   • CARDIAC DEFIBRILLATOR PLACEMENT     • MT EXCISION EXCESSIVE SKIN & SUBQ TISSUE ABDOMEN N/A 05/29/2019    Procedure: CIRCUMFERENTIAL ABDOMINOPLASTY;  Surgeon: Froy Gann MD;  Location:  MAIN OR;  Service: Plastics   • RECESSION GASTROC ENDOSCOPIC Right 9/29/2022    Procedure: RECESSION GASTROC ENDOSCOPIC;  Surgeon: Brandon Montemayor DPM;  [facility];  Service: Podiatry   • TOE OSTEOTOMY Right 9/29/2022    Procedure: 2ND HAMMERTOE REPAIR;  Surgeon: Brandon Montemayor DPM;  [facility];  Service: Podiatry   • TUBAL LIGATION  2009       Current Outpatient Medications:   •  ALPRAZolam (XANAX) 0.5 mg tablet, Take 0.5 tablets (0.25 mg total) by mouth 3 (three) times a day as needed for anxiety, Disp: 20 tablet, Rfl: 0  •  carvedilol (COREG) 25 mg tablet, TAKE 1 TABLET BY MOUTH TWICE A DAY, Disp: 180 tablet, Rfl: 5  •  esomeprazole (NexIUM) 40 MG capsule, , Disp: , Rfl:   •  lisinopril  (ZESTRIL) 5 mg tablet, TAKE 1 TABLET BY MOUTH TWICE A DAY, Disp: 180 tablet, Rfl: 3  No Known Allergies  Vitals:    03/07/24 1253   BP: 112/60   BP Location: Right arm   Patient Position: Sitting   Cuff Size: Large   Pulse: 85   SpO2: 99%   Weight: 82.1 kg (181 lb)     Weight (last 2 days)       Date/Time Weight    03/07/24 1253 82.1 (181)           Blood pressure 112/60, pulse 85, weight 82.1 kg (181 lb), last menstrual period 02/10/2024, SpO2 99%, not currently breastfeeding., Body mass index is 34.2 kg/m².    Labs:  Office Visit on 01/05/2024   Component Date Value   • Case Report 01/05/2024                      Value:Surgical Pathology Report                         Case: [id number]                                  Authorizing Provider:  Lidia Bird PA-C         Collected:           01/05/2024 08              Ordering Location:     [facility]      Received:            01/05/2024 [facility]                                     [facility]                                                                               [place]                                                                    Pathologist:           Sarath Parra MD                                                          Specimen:    Endometrium                                                                               • Final Diagnosis 01/05/2024                      Value:This result contains rich text formatting which cannot be displayed here.   • Additional Information 01/05/2024                      Value:This result contains rich text formatting which cannot be displayed here.   • Gross Description 01/05/2024                      Value:This result contains rich text formatting which cannot be displayed here.   • URINE HCG 01/05/2024 neg    Community Orders on 10/26/2023   Component Date Value   • WBC 10/26/2023 7.54    • RBC 10/26/2023 4.27    • Hemoglobin 10/26/2023 11.5    • Hematocrit 10/26/2023 36.8    • MCV  10/26/2023 86    • MCH 10/26/2023 26.9    • MCHC 10/26/2023 31.3 (L)    • RDW 10/26/2023 13.5    • MPV 10/26/2023 11.6    • Platelets 10/26/2023 223    • nRBC 10/26/2023 0    • Neutrophils Relative 10/26/2023 61    • Immat GRANS % 10/26/2023 0    • Lymphocytes Relative 10/26/2023 27    • Monocytes Relative 10/26/2023 11    • Eosinophils Relative 10/26/2023 1    • Basophils Relative 10/26/2023 0    • Neutrophils Absolute 10/26/2023 4.56    • Immature Grans Absolute 10/26/2023 0.03    • Lymphocytes Absolute 10/26/2023 2.03    • Monocytes Absolute 10/26/2023 0.82    • Eosinophils Absolute 10/26/2023 0.07    • Basophils Absolute 10/26/2023 0.03    • Sodium 10/26/2023 139    • Potassium 10/26/2023 4.3    • Chloride 10/26/2023 102    • CO2 10/26/2023 28    • ANION GAP 10/26/2023 9    • BUN 10/26/2023 12    • Creatinine 10/26/2023 0.59 (L)    • Glucose, Fasting 10/26/2023 84    • Calcium 10/26/2023 9.2    • eGFR 10/26/2023 111    Appointment on 10/15/2023   Component Date Value   • Cholesterol 10/15/2023 211 (H)    • Triglycerides 10/15/2023 103    • HDL, Direct 10/15/2023 54    • LDL Calculated 10/15/2023 136 (H)    • Non-HDL-Chol (CHOL-HDL) 10/15/2023 157    • Miscellaneous Lab Test R* 10/15/2023 see written report    Appointment on 09/16/2023   Component Date Value   • WBC 09/16/2023 6.79    • RBC 09/16/2023 4.29    • Hemoglobin 09/16/2023 11.5    • Hematocrit 09/16/2023 36.6    • MCV 09/16/2023 85    • MCH 09/16/2023 26.8    • MCHC 09/16/2023 31.4    • RDW 09/16/2023 13.4    • MPV 09/16/2023 11.7    • Platelets 09/16/2023 238    • nRBC 09/16/2023 0    • Neutrophils Relative 09/16/2023 58    • Immat GRANS % 09/16/2023 0    • Lymphocytes Relative 09/16/2023 28    • Monocytes Relative 09/16/2023 11    • Eosinophils Relative 09/16/2023 3    • Basophils Relative 09/16/2023 0    • Neutrophils Absolute 09/16/2023 3.97    • Immature Grans Absolute 09/16/2023 0.02    • Lymphocytes Absolute 09/16/2023 1.88    • Monocytes  Absolute 09/16/2023 0.71    • Eosinophils Absolute 09/16/2023 0.19    • Basophils Absolute 09/16/2023 0.02    • Sodium 09/16/2023 139    • Potassium 09/16/2023 4.4    • Chloride 09/16/2023 102    • CO2 09/16/2023 27    • ANION GAP 09/16/2023 10    • BUN 09/16/2023 17    • Creatinine 09/16/2023 0.63    • Glucose, Fasting 09/16/2023 107 (H)    • Calcium 09/16/2023 9.3    • AST 09/16/2023 13    • ALT 09/16/2023 13    • Alkaline Phosphatase 09/16/2023 63    • Total Protein 09/16/2023 7.3    • Albumin 09/16/2023 4.2    • Total Bilirubin 09/16/2023 0.28    • eGFR 09/16/2023 108      Imaging: Cardiac EP device report    Result Date: 2/12/2024  Narrative: MDT/CRT-D (DDD MODE)/ NOT MRI CONDITIONAL CARELINK TRANSMISSION: BATTERY VOLTAGE NEARING DANTE-1 MON. WILL SCHEDULE MONTHLY BATTERY CHECKS. AP 0% CRT PACING (BIV) 10% (LV) 89%. ALL AVAILABLE LEAD PARAMETERS WITHIN NORMAL LIMITS. NO SIGNIFICANT HIGH RATE EPISODES. VENT SENSING MARKERS NOTED. OPTI-VOL WITHIN NORMAL LIMITS. NORMAL DEVICE FUNCTION. NC       Review of Systems:  Review of Systems   Constitutional:  Negative for diaphoresis, fatigue, fever and unexpected weight change.   HENT: Negative.     Respiratory:  Negative for cough, shortness of breath and wheezing.    Cardiovascular:  Negative for chest pain, palpitations and leg swelling.   Gastrointestinal:  Negative for abdominal pain, diarrhea and nausea.   Musculoskeletal:  Negative for gait problem and myalgias.   Skin:  Negative for rash.   Neurological:  Negative for dizziness and numbness.   Psychiatric/Behavioral: Negative.         Physical Exam:  Physical Exam  Constitutional:       Appearance: She is well-developed.   HENT:      Head: Normocephalic and atraumatic.   Eyes:      Pupils: Pupils are equal, round, and reactive to light.   Neck:      Vascular: No JVD.   Cardiovascular:      Rate and Rhythm: Regular rhythm.      Pulses: Normal pulses.           Carotid pulses are 2+ on the right side and 2+ on the  left side.     Heart sounds: S1 normal and S2 normal.   Pulmonary:      Effort: Pulmonary effort is normal.      Breath sounds: Normal breath sounds. No wheezing or rales.   Abdominal:      General: Bowel sounds are normal.      Palpations: Abdomen is soft.   Musculoskeletal:         General: No tenderness. Normal range of motion.      Cervical back: Normal range of motion and neck supple.   Skin:     General: Skin is warm.   Neurological:      Mental Status: She is alert and oriented to person, place, and time.      Cranial Nerves: No cranial nerve deficit.      Deep Tendon Reflexes: Reflexes are normal and symmetric.

## 2024-03-07 NOTE — H&P (VIEW-ONLY)
Cardiology Follow Up    Tara Marquez  1980  [id number]  [facility] [place]  [address]  [place] [id number] [phone]    1. Nonischemic cardiomyopathy (HCC)  POCT ECG    Echo complete w/ contrast if indicated      2. Paroxysmal atrial fibrillation (HCC)        3. Left bundle-branch block        4. Preop cardiovascular exam              Discussion/Summary: She is scheduled for a repeat ECHO on 3/22/2024. Her BiV ICD battery needs replacement and this will be scheduled for next week.  If her echo is OV, she is cleared to have her hysterectomy the week of 3/25/2024.      Interval History: She has not had any cardiac problems since her last OV. She is active and denies CP, SOB, LE edema.  She has a LBBB / non ischemic cardiomyopathy. EF has been 50% with medications and biV pacing.  Her weight is up from 175 to 181 lbs.    /60    Her BiV ICD is reaching EOL and needs generator change.    Last ECHO was in 2/2022.    She will need hysterectomy in the next few weeks.        Patient Active Problem List   Diagnosis    Anxiety    Cardiomyopathy (HCC)    Left bundle-branch block    Menorrhagia with regular cycle    Mitral regurgitation    Chest pain on breathing    Annual physical exam    Lipodystrophy    Class 1 obesity due to excess calories with serious comorbidity and body mass index (BMI) of 34.0 to 34.9 in adult    Paroxysmal atrial fibrillation (HCC)    Night sweats    Generalized abdominal pain    Pre-op examination    Pacemaker    History of motion sickness    Gastritis    Adenomyosis of the uterus    Pelvic pain    Preop cardiovascular exam     Past Medical History:   Diagnosis Date    Allergic rhinitis     Anxiety     Atrial fibrillation (HCC)     paroxysmal    Bunion 1985    Candidal vulvovaginitis     Cardiomyopathy (HCC)     Chest pain     CHF (congestive heart failure) (HCC)     chronic systolic    Coronary  artery disease     Fibroid     Hammertoe of right foot     Herpes simplex     Irregular heart beat     LBBB (left bundle branch block)     Normal delivery     2005 son, 2008 daughter    Pacemaker      Social History     Socioeconomic History    Marital status: /Civil Union     Spouse name: Not on file    Number of children: Not on file    Years of education: Not on file    Highest education level: Not on file   Occupational History    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   Tobacco Use    Smoking status: Never    Smokeless tobacco: Never    Tobacco comments:     history of second hand smoke exposure as a child   Vaping Use    Vaping status: Never Used   Substance and Sexual Activity    Alcohol use: Yes     Alcohol/week: 2.0 standard drinks of alcohol     Types: 2 Glasses of wine per week     Comment: social    Drug use: No    Sexual activity: Yes     Partners: Male     Birth control/protection: Female Sterilization   Other Topics Concern    Not on file   Social History Narrative    Lives with her  and 2 children    Works full time     Exercises 4-5 times per week    Reports a healthy/balanced diet     Social Determinants of Health     Financial Resource Strain: Not on file   Food Insecurity: Not on file   Transportation Needs: Not on file   Physical Activity: Not on file   Stress: Not on file   Social Connections: Not on file   Intimate Partner Violence: Not on file   Housing Stability: Not on file      Family History   Problem Relation Age of Onset    Hypertension Mother     Diabetes Father     Hypertension Father     Heart disease Father     Hyperlipidemia Father     No Known Problems Brother     No Known Problems Brother     No Known Problems Daughter     No Known Problems Son     No Known Problems Maternal Grandmother     No Known Problems Maternal Grandfather     No Known Problems Paternal Grandmother     No Known Problems Paternal Grandfather     No Known Problems Maternal Aunt     No Known Problems  Paternal Aunt     No Known Problems Paternal Aunt     No Known Problems Paternal Aunt     Breast cancer Neg Hx     Colon cancer Neg Hx     Ovarian cancer Neg Hx      Past Surgical History:   Procedure Laterality Date    BUNIONECTOMY Bilateral     BUNIONECTOMY Right 9/29/2022    Procedure: LAPIDUS BUNIONECTOMY;  Surgeon: Brandon Montemayor DPM;  [facility];  Service: Podiatry    CARDIAC DEFIBRILLATOR PLACEMENT      MT EXCISION EXCESSIVE SKIN & SUBQ TISSUE ABDOMEN N/A 05/29/2019    Procedure: CIRCUMFERENTIAL ABDOMINOPLASTY;  Surgeon: Froy Gann MD;  Location:  MAIN OR;  Service: Plastics    RECESSION GASTROC ENDOSCOPIC Right 9/29/2022    Procedure: RECESSION GASTROC ENDOSCOPIC;  Surgeon: Brandon Montemayor DPM;  [facility];  Service: Podiatry    TOE OSTEOTOMY Right 9/29/2022    Procedure: 2ND HAMMERTOE REPAIR;  Surgeon: Brandon Montemayor DPM;  [facility];  Service: Podiatry    TUBAL LIGATION  2009       Current Outpatient Medications:     ALPRAZolam (XANAX) 0.5 mg tablet, Take 0.5 tablets (0.25 mg total) by mouth 3 (three) times a day as needed for anxiety, Disp: 20 tablet, Rfl: 0    carvedilol (COREG) 25 mg tablet, TAKE 1 TABLET BY MOUTH TWICE A DAY, Disp: 180 tablet, Rfl: 5    esomeprazole (NexIUM) 40 MG capsule, , Disp: , Rfl:     lisinopril (ZESTRIL) 5 mg tablet, TAKE 1 TABLET BY MOUTH TWICE A DAY, Disp: 180 tablet, Rfl: 3  No Known Allergies  Vitals:    03/07/24 1253   BP: 112/60   BP Location: Right arm   Patient Position: Sitting   Cuff Size: Large   Pulse: 85   SpO2: 99%   Weight: 82.1 kg (181 lb)     Weight (last 2 days)       Date/Time Weight    03/07/24 1253 82.1 (181)           Blood pressure 112/60, pulse 85, weight 82.1 kg (181 lb), last menstrual period 02/10/2024, SpO2 99%, not currently breastfeeding., Body mass index is 34.2 kg/m².    Labs:  Office Visit on 01/05/2024   Component Date Value    Case Report 01/05/2024                       Value:Surgical Pathology Report                         Case: [id number]                                  Authorizing Provider:  Lidia Bird PA-C         Collected:           01/05/2024 0851              Ordering Location:     [facility]      Received:            01/05/2024 0851                                     [facility]                                                                               [place]                                                                    Pathologist:           Sarath Parra MD                                                          Specimen:    Endometrium                                                                                Final Diagnosis 01/05/2024                      Value:This result contains rich text formatting which cannot be displayed here.    Additional Information 01/05/2024                      Value:This result contains rich text formatting which cannot be displayed here.    Gross Description 01/05/2024                      Value:This result contains rich text formatting which cannot be displayed here.    URINE HCG 01/05/2024 neg    Community Orders on 10/26/2023   Component Date Value    WBC 10/26/2023 7.54     RBC 10/26/2023 4.27     Hemoglobin 10/26/2023 11.5     Hematocrit 10/26/2023 36.8     MCV 10/26/2023 86     MCH 10/26/2023 26.9     MCHC 10/26/2023 31.3 (L)     RDW 10/26/2023 13.5     MPV 10/26/2023 11.6     Platelets 10/26/2023 223     nRBC 10/26/2023 0     Neutrophils Relative 10/26/2023 61     Immat GRANS % 10/26/2023 0     Lymphocytes Relative 10/26/2023 27     Monocytes Relative 10/26/2023 11     Eosinophils Relative 10/26/2023 1     Basophils Relative 10/26/2023 0     Neutrophils Absolute 10/26/2023 4.56     Immature Grans Absolute 10/26/2023 0.03     Lymphocytes Absolute 10/26/2023 2.03     Monocytes Absolute 10/26/2023 0.82     Eosinophils Absolute 10/26/2023 0.07     Basophils Absolute 10/26/2023 0.03      Sodium 10/26/2023 139     Potassium 10/26/2023 4.3     Chloride 10/26/2023 102     CO2 10/26/2023 28     ANION GAP 10/26/2023 9     BUN 10/26/2023 12     Creatinine 10/26/2023 0.59 (L)     Glucose, Fasting 10/26/2023 84     Calcium 10/26/2023 9.2     eGFR 10/26/2023 111    Appointment on 10/15/2023   Component Date Value    Cholesterol 10/15/2023 211 (H)     Triglycerides 10/15/2023 103     HDL, Direct 10/15/2023 54     LDL Calculated 10/15/2023 136 (H)     Non-HDL-Chol (CHOL-HDL) 10/15/2023 157     Miscellaneous Lab Test R* 10/15/2023 see written report    Appointment on 09/16/2023   Component Date Value    WBC 09/16/2023 6.79     RBC 09/16/2023 4.29     Hemoglobin 09/16/2023 11.5     Hematocrit 09/16/2023 36.6     MCV 09/16/2023 85     MCH 09/16/2023 26.8     MCHC 09/16/2023 31.4     RDW 09/16/2023 13.4     MPV 09/16/2023 11.7     Platelets 09/16/2023 238     nRBC 09/16/2023 0     Neutrophils Relative 09/16/2023 58     Immat GRANS % 09/16/2023 0     Lymphocytes Relative 09/16/2023 28     Monocytes Relative 09/16/2023 11     Eosinophils Relative 09/16/2023 3     Basophils Relative 09/16/2023 0     Neutrophils Absolute 09/16/2023 3.97     Immature Grans Absolute 09/16/2023 0.02     Lymphocytes Absolute 09/16/2023 1.88     Monocytes Absolute 09/16/2023 0.71     Eosinophils Absolute 09/16/2023 0.19     Basophils Absolute 09/16/2023 0.02     Sodium 09/16/2023 139     Potassium 09/16/2023 4.4     Chloride 09/16/2023 102     CO2 09/16/2023 27     ANION GAP 09/16/2023 10     BUN 09/16/2023 17     Creatinine 09/16/2023 0.63     Glucose, Fasting 09/16/2023 107 (H)     Calcium 09/16/2023 9.3     AST 09/16/2023 13     ALT 09/16/2023 13     Alkaline Phosphatase 09/16/2023 63     Total Protein 09/16/2023 7.3     Albumin 09/16/2023 4.2     Total Bilirubin 09/16/2023 0.28     eGFR 09/16/2023 108      Imaging: Cardiac EP device report    Result Date: 2/12/2024  Narrative: MDT/CRT-D (DDD MODE)/ NOT MRI CONDITIONAL CARELINK  TRANSMISSION: BATTERY VOLTAGE NEARING DANTE-1 MON. WILL SCHEDULE MONTHLY BATTERY CHECKS. AP 0% CRT PACING (BIV) 10% (LV) 89%. ALL AVAILABLE LEAD PARAMETERS WITHIN NORMAL LIMITS. NO SIGNIFICANT HIGH RATE EPISODES. VENT SENSING MARKERS NOTED. OPTI-VOL WITHIN NORMAL LIMITS. NORMAL DEVICE FUNCTION. NC       Review of Systems:  Review of Systems   Constitutional:  Negative for diaphoresis, fatigue, fever and unexpected weight change.   HENT: Negative.     Respiratory:  Negative for cough, shortness of breath and wheezing.    Cardiovascular:  Negative for chest pain, palpitations and leg swelling.   Gastrointestinal:  Negative for abdominal pain, diarrhea and nausea.   Musculoskeletal:  Negative for gait problem and myalgias.   Skin:  Negative for rash.   Neurological:  Negative for dizziness and numbness.   Psychiatric/Behavioral: Negative.         Physical Exam:  Physical Exam  Constitutional:       Appearance: She is well-developed.   HENT:      Head: Normocephalic and atraumatic.   Eyes:      Pupils: Pupils are equal, round, and reactive to light.   Neck:      Vascular: No JVD.   Cardiovascular:      Rate and Rhythm: Regular rhythm.      Pulses: Normal pulses.           Carotid pulses are 2+ on the right side and 2+ on the left side.     Heart sounds: S1 normal and S2 normal.   Pulmonary:      Effort: Pulmonary effort is normal.      Breath sounds: Normal breath sounds. No wheezing or rales.   Abdominal:      General: Bowel sounds are normal.      Palpations: Abdomen is soft.   Musculoskeletal:         General: No tenderness. Normal range of motion.      Cervical back: Normal range of motion and neck supple.   Skin:     General: Skin is warm.   Neurological:      Mental Status: She is alert and oriented to person, place, and time.      Cranial Nerves: No cranial nerve deficit.      Deep Tendon Reflexes: Reflexes are normal and symmetric.

## 2024-03-07 NOTE — TELEPHONE ENCOUNTER
This patient is been referred to have an urgent BIV ICD gen change, we are planning to have it done on 03/14/2024 at \[facility]\"" per Dr Moore.  Can you guys help us with this urgent request.  Thank you.

## 2024-03-07 NOTE — TELEPHONE ENCOUNTER
Elsie  place the order in the system since its already scheduled and it should show up on our work que

## 2024-03-07 NOTE — PROGRESS NOTES
Cardiology Follow Up    Tara Marquez  1980  [id number]  [facility] [place]  [address]  [place] [id number] [phone]    1. Nonischemic cardiomyopathy (HCC)  POCT ECG    Echo complete w/ contrast if indicated      2. Paroxysmal atrial fibrillation (HCC)        3. Left bundle-branch block        4. Preop cardiovascular exam              Discussion/Summary: She is scheduled for a repeat ECHO on 3/22/2024. Her BiV ICD battery needs replacement and this will be scheduled for next week.  If her echo is OV, she is cleared to have her hysterectomy the week of 3/25/2024.      Interval History: She has not had any cardiac problems since her last OV. She is active and denies CP, SOB, LE edema.  She has a LBBB / non ischemic cardiomyopathy. EF has been 50% with medications and biV pacing.  Her weight is up from 175 to 181 lbs.    /60    Her BiV ICD is reaching EOL and needs generator change.    Last ECHO was in 2/2022.    She will need hysterectomy in the next few weeks.        Patient Active Problem List   Diagnosis    Anxiety    Cardiomyopathy (HCC)    Left bundle-branch block    Menorrhagia with regular cycle    Mitral regurgitation    Chest pain on breathing    Annual physical exam    Lipodystrophy    Class 1 obesity due to excess calories with serious comorbidity and body mass index (BMI) of 34.0 to 34.9 in adult    Paroxysmal atrial fibrillation (HCC)    Night sweats    Generalized abdominal pain    Pre-op examination    Pacemaker    History of motion sickness    Gastritis    Adenomyosis of the uterus    Pelvic pain    Preop cardiovascular exam     Past Medical History:   Diagnosis Date    Allergic rhinitis     Anxiety     Atrial fibrillation (HCC)     paroxysmal    Bunion 1985    Candidal vulvovaginitis     Cardiomyopathy (HCC)     Chest pain     CHF (congestive heart failure) (HCC)     chronic systolic    Coronary  artery disease     Fibroid     Hammertoe of right foot     Herpes simplex     Irregular heart beat     LBBB (left bundle branch block)     Normal delivery     2005 son, 2008 daughter    Pacemaker      Social History     Socioeconomic History    Marital status: /Civil Union     Spouse name: Not on file    Number of children: Not on file    Years of education: Not on file    Highest education level: Not on file   Occupational History    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   Tobacco Use    Smoking status: Never    Smokeless tobacco: Never    Tobacco comments:     history of second hand smoke exposure as a child   Vaping Use    Vaping status: Never Used   Substance and Sexual Activity    Alcohol use: Yes     Alcohol/week: 2.0 standard drinks of alcohol     Types: 2 Glasses of wine per week     Comment: social    Drug use: No    Sexual activity: Yes     Partners: Male     Birth control/protection: Female Sterilization   Other Topics Concern    Not on file   Social History Narrative    Lives with her  and 2 children    Works full time     Exercises 4-5 times per week    Reports a healthy/balanced diet     Social Determinants of Health     Financial Resource Strain: Not on file   Food Insecurity: Not on file   Transportation Needs: Not on file   Physical Activity: Not on file   Stress: Not on file   Social Connections: Not on file   Intimate Partner Violence: Not on file   Housing Stability: Not on file      Family History   Problem Relation Age of Onset    Hypertension Mother     Diabetes Father     Hypertension Father     Heart disease Father     Hyperlipidemia Father     No Known Problems Brother     No Known Problems Brother     No Known Problems Daughter     No Known Problems Son     No Known Problems Maternal Grandmother     No Known Problems Maternal Grandfather     No Known Problems Paternal Grandmother     No Known Problems Paternal Grandfather     No Known Problems Maternal Aunt     No Known Problems  Paternal Aunt     No Known Problems Paternal Aunt     No Known Problems Paternal Aunt     Breast cancer Neg Hx     Colon cancer Neg Hx     Ovarian cancer Neg Hx      Past Surgical History:   Procedure Laterality Date    BUNIONECTOMY Bilateral     BUNIONECTOMY Right 9/29/2022    Procedure: LAPIDUS BUNIONECTOMY;  Surgeon: Brandon Montemayor DPM;  [facility];  Service: Podiatry    CARDIAC DEFIBRILLATOR PLACEMENT      NE EXCISION EXCESSIVE SKIN & SUBQ TISSUE ABDOMEN N/A 05/29/2019    Procedure: CIRCUMFERENTIAL ABDOMINOPLASTY;  Surgeon: Froy Gann MD;  Location:  MAIN OR;  Service: Plastics    RECESSION GASTROC ENDOSCOPIC Right 9/29/2022    Procedure: RECESSION GASTROC ENDOSCOPIC;  Surgeon: Brandon Montemayor DPM;  [facility];  Service: Podiatry    TOE OSTEOTOMY Right 9/29/2022    Procedure: 2ND HAMMERTOE REPAIR;  Surgeon: Brandon Montemayor DPM;  [facility];  Service: Podiatry    TUBAL LIGATION  2009       Current Outpatient Medications:     ALPRAZolam (XANAX) 0.5 mg tablet, Take 0.5 tablets (0.25 mg total) by mouth 3 (three) times a day as needed for anxiety, Disp: 20 tablet, Rfl: 0    carvedilol (COREG) 25 mg tablet, TAKE 1 TABLET BY MOUTH TWICE A DAY, Disp: 180 tablet, Rfl: 5    esomeprazole (NexIUM) 40 MG capsule, , Disp: , Rfl:     lisinopril (ZESTRIL) 5 mg tablet, TAKE 1 TABLET BY MOUTH TWICE A DAY, Disp: 180 tablet, Rfl: 3  No Known Allergies  Vitals:    03/07/24 1253   BP: 112/60   BP Location: Right arm   Patient Position: Sitting   Cuff Size: Large   Pulse: 85   SpO2: 99%   Weight: 82.1 kg (181 lb)     Weight (last 2 days)       Date/Time Weight    03/07/24 1253 82.1 (181)           Blood pressure 112/60, pulse 85, weight 82.1 kg (181 lb), last menstrual period 02/10/2024, SpO2 99%, not currently breastfeeding., Body mass index is 34.2 kg/m².    Labs:  Office Visit on 01/05/2024   Component Date Value    Case Report 01/05/2024                       Value:Surgical Pathology Report                         Case: [id number]                                  Authorizing Provider:  Lidia Bird PA-C         Collected:           01/05/2024 0851              Ordering Location:     [facility]      Received:            01/05/2024 0851                                     [facility]                                                                               [place]                                                                    Pathologist:           Sarath Parra MD                                                          Specimen:    Endometrium                                                                                Final Diagnosis 01/05/2024                      Value:This result contains rich text formatting which cannot be displayed here.    Additional Information 01/05/2024                      Value:This result contains rich text formatting which cannot be displayed here.    Gross Description 01/05/2024                      Value:This result contains rich text formatting which cannot be displayed here.    URINE HCG 01/05/2024 neg    Community Orders on 10/26/2023   Component Date Value    WBC 10/26/2023 7.54     RBC 10/26/2023 4.27     Hemoglobin 10/26/2023 11.5     Hematocrit 10/26/2023 36.8     MCV 10/26/2023 86     MCH 10/26/2023 26.9     MCHC 10/26/2023 31.3 (L)     RDW 10/26/2023 13.5     MPV 10/26/2023 11.6     Platelets 10/26/2023 223     nRBC 10/26/2023 0     Neutrophils Relative 10/26/2023 61     Immat GRANS % 10/26/2023 0     Lymphocytes Relative 10/26/2023 27     Monocytes Relative 10/26/2023 11     Eosinophils Relative 10/26/2023 1     Basophils Relative 10/26/2023 0     Neutrophils Absolute 10/26/2023 4.56     Immature Grans Absolute 10/26/2023 0.03     Lymphocytes Absolute 10/26/2023 2.03     Monocytes Absolute 10/26/2023 0.82     Eosinophils Absolute 10/26/2023 0.07     Basophils Absolute 10/26/2023 0.03      Sodium 10/26/2023 139     Potassium 10/26/2023 4.3     Chloride 10/26/2023 102     CO2 10/26/2023 28     ANION GAP 10/26/2023 9     BUN 10/26/2023 12     Creatinine 10/26/2023 0.59 (L)     Glucose, Fasting 10/26/2023 84     Calcium 10/26/2023 9.2     eGFR 10/26/2023 111    Appointment on 10/15/2023   Component Date Value    Cholesterol 10/15/2023 211 (H)     Triglycerides 10/15/2023 103     HDL, Direct 10/15/2023 54     LDL Calculated 10/15/2023 136 (H)     Non-HDL-Chol (CHOL-HDL) 10/15/2023 157     Miscellaneous Lab Test R* 10/15/2023 see written report    Appointment on 09/16/2023   Component Date Value    WBC 09/16/2023 6.79     RBC 09/16/2023 4.29     Hemoglobin 09/16/2023 11.5     Hematocrit 09/16/2023 36.6     MCV 09/16/2023 85     MCH 09/16/2023 26.8     MCHC 09/16/2023 31.4     RDW 09/16/2023 13.4     MPV 09/16/2023 11.7     Platelets 09/16/2023 238     nRBC 09/16/2023 0     Neutrophils Relative 09/16/2023 58     Immat GRANS % 09/16/2023 0     Lymphocytes Relative 09/16/2023 28     Monocytes Relative 09/16/2023 11     Eosinophils Relative 09/16/2023 3     Basophils Relative 09/16/2023 0     Neutrophils Absolute 09/16/2023 3.97     Immature Grans Absolute 09/16/2023 0.02     Lymphocytes Absolute 09/16/2023 1.88     Monocytes Absolute 09/16/2023 0.71     Eosinophils Absolute 09/16/2023 0.19     Basophils Absolute 09/16/2023 0.02     Sodium 09/16/2023 139     Potassium 09/16/2023 4.4     Chloride 09/16/2023 102     CO2 09/16/2023 27     ANION GAP 09/16/2023 10     BUN 09/16/2023 17     Creatinine 09/16/2023 0.63     Glucose, Fasting 09/16/2023 107 (H)     Calcium 09/16/2023 9.3     AST 09/16/2023 13     ALT 09/16/2023 13     Alkaline Phosphatase 09/16/2023 63     Total Protein 09/16/2023 7.3     Albumin 09/16/2023 4.2     Total Bilirubin 09/16/2023 0.28     eGFR 09/16/2023 108      Imaging: Cardiac EP device report    Result Date: 2/12/2024  Narrative: MDT/CRT-D (DDD MODE)/ NOT MRI CONDITIONAL CARELINK  TRANSMISSION: BATTERY VOLTAGE NEARING DANTE-1 MON. WILL SCHEDULE MONTHLY BATTERY CHECKS. AP 0% CRT PACING (BIV) 10% (LV) 89%. ALL AVAILABLE LEAD PARAMETERS WITHIN NORMAL LIMITS. NO SIGNIFICANT HIGH RATE EPISODES. VENT SENSING MARKERS NOTED. OPTI-VOL WITHIN NORMAL LIMITS. NORMAL DEVICE FUNCTION. NC       Review of Systems:  Review of Systems   Constitutional:  Negative for diaphoresis, fatigue, fever and unexpected weight change.   HENT: Negative.     Respiratory:  Negative for cough, shortness of breath and wheezing.    Cardiovascular:  Negative for chest pain, palpitations and leg swelling.   Gastrointestinal:  Negative for abdominal pain, diarrhea and nausea.   Musculoskeletal:  Negative for gait problem and myalgias.   Skin:  Negative for rash.   Neurological:  Negative for dizziness and numbness.   Psychiatric/Behavioral: Negative.         Physical Exam:  Physical Exam  Constitutional:       Appearance: She is well-developed.   HENT:      Head: Normocephalic and atraumatic.   Eyes:      Pupils: Pupils are equal, round, and reactive to light.   Neck:      Vascular: No JVD.   Cardiovascular:      Rate and Rhythm: Regular rhythm.      Pulses: Normal pulses.           Carotid pulses are 2+ on the right side and 2+ on the left side.     Heart sounds: S1 normal and S2 normal.   Pulmonary:      Effort: Pulmonary effort is normal.      Breath sounds: Normal breath sounds. No wheezing or rales.   Abdominal:      General: Bowel sounds are normal.      Palpations: Abdomen is soft.   Musculoskeletal:         General: No tenderness. Normal range of motion.      Cervical back: Normal range of motion and neck supple.   Skin:     General: Skin is warm.   Neurological:      Mental Status: She is alert and oriented to person, place, and time.      Cranial Nerves: No cranial nerve deficit.      Deep Tendon Reflexes: Reflexes are normal and symmetric.

## 2024-03-08 ENCOUNTER — OFFICE VISIT (OUTPATIENT)
Dept: FAMILY MEDICINE CLINIC | Facility: CLINIC | Age: 44
End: 2024-03-08
Payer: COMMERCIAL

## 2024-03-08 VITALS
WEIGHT: 182 LBS | TEMPERATURE: 98.1 F | HEIGHT: 61 IN | HEART RATE: 88 BPM | RESPIRATION RATE: 16 BRPM | SYSTOLIC BLOOD PRESSURE: 124 MMHG | BODY MASS INDEX: 34.36 KG/M2 | OXYGEN SATURATION: 97 % | DIASTOLIC BLOOD PRESSURE: 82 MMHG

## 2024-03-08 DIAGNOSIS — I42.0 DILATED CARDIOMYOPATHY (HCC): ICD-10-CM

## 2024-03-08 DIAGNOSIS — E78.2 MODERATE MIXED HYPERLIPIDEMIA NOT REQUIRING STATIN THERAPY: ICD-10-CM

## 2024-03-08 DIAGNOSIS — I48.0 PAROXYSMAL ATRIAL FIBRILLATION (HCC): ICD-10-CM

## 2024-03-08 DIAGNOSIS — Z00.00 ANNUAL PHYSICAL EXAM: Primary | ICD-10-CM

## 2024-03-08 DIAGNOSIS — N80.03 ADENOMYOSIS OF THE UTERUS: ICD-10-CM

## 2024-03-08 DIAGNOSIS — R73.01 IFG (IMPAIRED FASTING GLUCOSE): ICD-10-CM

## 2024-03-08 PROCEDURE — 99396 PREV VISIT EST AGE 40-64: CPT | Performed by: NURSE PRACTITIONER

## 2024-03-08 NOTE — PROGRESS NOTES
ADULT ANNUAL PHYSICAL  [facility]    NAME: Tara Marquez  AGE: 43 y.o. SEX: female  : 1980     DATE: 3/8/2024     Assessment and Plan:     Problem List Items Addressed This Visit          Endocrine    IFG (impaired fasting glucose)     Check A1c, encourage healthy diet, exercise.         Relevant Orders    Hemoglobin A1C       Cardiovascular and Mediastinum    Cardiomyopathy (HCC)     Ongoing follow up with cardiology.          Paroxysmal atrial fibrillation (HCC)     Scheduled for icd replacement next week.  No chest pain, palpitations.  Continue carvedilol.            Genitourinary    Adenomyosis of the uterus     Planning hysterectomy with gyn.  First having icd replaced.             Other    Annual physical exam - Primary     Unremarkable exam of adult female.  Screenings up to date.  Continue regular dental and eye exams, encourage healthy diet and regular exercise.         Moderate mixed hyperlipidemia not requiring statin therapy     Ascvd score is 0.9%.  repeat lipid panel with upcoming labs.         Relevant Orders    Lipid panel         Immunizations and preventive care screenings were discussed with patient today. Appropriate education was printed on patient's after visit summary.    Counseling:  Dental Health: discussed importance of regular tooth brushing, flossing, and dental visits.  Injury prevention: discussed safety/seat belts, safety helmets, smoke detectors, carbon dioxide detectors, and smoking near bedding or upholstery.  Exercise: the importance of regular exercise/physical activity was discussed. Recommend exercise 3-5 times per week for at least 30 minutes.       Depression Screening and Follow-up Plan: Patient was screened for depression during today's encounter. They screened negative with a PHQ-2 score of 0.        No follow-ups on file.     Chief Complaint:     Chief Complaint   Patient presents with    Physical Exam      Patient is being seen for physical exam.       History of Present Illness:     Adult Annual Physical   Patient here for a comprehensive physical exam. The patient reports no problems.    Diet and Physical Activity  Diet/Nutrition:  diet is fair; inconsistent due to busy schedule .   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      Depression Screening  PHQ-2/9 Depression Screening    Little interest or pleasure in doing things: 0 - not at all  Feeling down, depressed, or hopeless: 0 - not at all  PHQ-2 Score: 0  PHQ-2 Interpretation: Negative depression screen       General Health  Sleep: sleeps well.   Hearing: normal - bilateral.  Vision: no vision problems, goes for regular eye exams, and wears glasses.   Dental: regular dental visits and brushes teeth twice daily.       /GYN Health  Follows with gynecology? yes   Patient is: premenopausal  Last menstrual period: 3/8/24  Contraceptive method:  none; tubal ligation .       Review of Systems:     Review of Systems   Constitutional:  Positive for diaphoresis (night sweats regularly). Negative for activity change, appetite change, chills, fatigue, fever and unexpected weight change.   HENT:  Negative for hearing loss.    Eyes:  Negative for visual disturbance.   Respiratory:  Negative for chest tightness and shortness of breath.    Cardiovascular:  Negative for chest pain, palpitations and leg swelling.   Gastrointestinal:  Negative for abdominal pain, constipation, diarrhea, nausea and vomiting.   Genitourinary:  Negative for difficulty urinating, dysuria and frequency.   Musculoskeletal:  Negative for arthralgias and myalgias.   Allergic/Immunologic: Negative for environmental allergies.   Neurological:  Negative for dizziness, weakness, light-headedness, numbness and headaches.   Psychiatric/Behavioral:  Negative for dysphoric mood and sleep disturbance. The patient is nervous/anxious (intermittent with stress).        Past Medical History:     Past Medical History:   Diagnosis Date    Allergic rhinitis     Anxiety     Atrial fibrillation (HCC)     paroxysmal    Bunion 1985    Candidal vulvovaginitis     Cardiomyopathy (HCC)     Chest pain     CHF (congestive heart failure) (HCC)     chronic systolic    Coronary artery disease     Fibroid     Hammertoe of right foot     Herpes simplex     Irregular heart beat     LBBB (left bundle branch block)     Normal delivery     2005 son, 2008 daughter    Pacemaker       Past Surgical History:     Past Surgical History:   Procedure Laterality Date    BUNIONECTOMY Bilateral     BUNIONECTOMY Right 9/29/2022    Procedure: LAPIDUS BUNIONECTOMY;  Surgeon: Brandon Montemayor DPM;  [facility];  Service: Podiatry    CARDIAC DEFIBRILLATOR PLACEMENT      AK EXCISION EXCESSIVE SKIN & SUBQ TISSUE ABDOMEN N/A 05/29/2019    Procedure: CIRCUMFERENTIAL ABDOMINOPLASTY;  Surgeon: Froy Gann MD;  Location:  MAIN OR;  Service: Plastics    RECESSION GASTROC ENDOSCOPIC Right 9/29/2022    Procedure: RECESSION GASTROC ENDOSCOPIC;  Surgeon: Brandon Montemayor DPM;  [facility];  Service: Podiatry    TOE OSTEOTOMY Right 9/29/2022    Procedure: 2ND HAMMERTOE REPAIR;  Surgeon: Brandon Montemayor DPM;  [facility];  Service: Podiatry    TUBAL LIGATION  2009      Social History:     Social History     Socioeconomic History    Marital status: /Civil Union     Spouse name: None    Number of children: None    Years of education: None    Highest education level: None   Occupational History    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   Tobacco Use    Smoking status: Never    Smokeless tobacco: Never    Tobacco comments:     history of second hand smoke exposure as a child   Vaping Use    Vaping status: Never Used   Substance and Sexual Activity    Alcohol use: Yes     Alcohol/week: 2.0 standard drinks of alcohol     Types: 2 Glasses of wine per week     Comment: social    Drug  use: No    Sexual activity: Yes     Partners: Male     Birth control/protection: Female Sterilization   Other Topics Concern    None   Social History Narrative    Lives with her  and 2 children    Works full time     Exercises 4-5 times per week    Reports a healthy/balanced diet     Social Determinants of Health     Financial Resource Strain: Not on file   Food Insecurity: Not on file   Transportation Needs: Not on file   Physical Activity: Not on file   Stress: Not on file   Social Connections: Not on file   Intimate Partner Violence: Not on file   Housing Stability: Not on file      Family History:     Family History   Problem Relation Age of Onset    Hypertension Mother     Diabetes Father     Hypertension Father     Heart disease Father     Hyperlipidemia Father     No Known Problems Brother     No Known Problems Brother     No Known Problems Daughter     No Known Problems Son     No Known Problems Maternal Grandmother     No Known Problems Maternal Grandfather     No Known Problems Paternal Grandmother     No Known Problems Paternal Grandfather     No Known Problems Maternal Aunt     No Known Problems Paternal Aunt     No Known Problems Paternal Aunt     No Known Problems Paternal Aunt     Breast cancer Neg Hx     Colon cancer Neg Hx     Ovarian cancer Neg Hx       Current Medications:     Current Outpatient Medications   Medication Sig Dispense Refill    ALPRAZolam (XANAX) 0.5 mg tablet Take 0.5 tablets (0.25 mg total) by mouth 3 (three) times a day as needed for anxiety 20 tablet 0    carvedilol (COREG) 25 mg tablet TAKE 1 TABLET BY MOUTH TWICE A  tablet 5    esomeprazole (NexIUM) 40 MG capsule       lisinopril (ZESTRIL) 5 mg tablet TAKE 1 TABLET BY MOUTH TWICE A  tablet 3     No current facility-administered medications for this visit.      Allergies:     No Known Allergies   Physical Exam:     /82 (BP Location: Left arm, Patient Position: Sitting, Cuff Size: Standard)   Pulse 88  "  Temp 98.1 °F (36.7 °C) (Tympanic)   Resp 16   Ht 5' 1\" (1.549 m)   Wt 82.6 kg (182 lb)   LMP 02/10/2024 (Exact Date)   SpO2 97%   BMI 34.39 kg/m²     Physical Exam  Vitals reviewed.   Constitutional:       General: She is awake. She is not in acute distress.     Appearance: Normal appearance. She is well-developed and well-groomed. She is not ill-appearing.   HENT:      Head: Normocephalic.      Right Ear: Hearing, tympanic membrane, ear canal and external ear normal.      Left Ear: Hearing, tympanic membrane, ear canal and external ear normal.      Nose: Nose normal.      Mouth/Throat:      Lips: Pink.      Mouth: Mucous membranes are moist.      Pharynx: Oropharynx is clear. Uvula midline.   Eyes:      General: Lids are normal.      Conjunctiva/sclera: Conjunctivae normal.      Pupils: Pupils are equal, round, and reactive to light.   Neck:      Thyroid: No thyroid mass or thyromegaly.      Vascular: No carotid bruit or JVD.   Cardiovascular:      Rate and Rhythm: Normal rate and regular rhythm.      Pulses: Normal pulses.      Heart sounds: Normal heart sounds, S1 normal and S2 normal. No murmur heard.  Pulmonary:      Effort: Pulmonary effort is normal.      Breath sounds: Normal breath sounds.   Abdominal:      General: Abdomen is flat. Bowel sounds are normal.      Palpations: Abdomen is soft.      Tenderness: There is no abdominal tenderness.   Musculoskeletal:         General: Normal range of motion.      Cervical back: Full passive range of motion without pain.      Right lower leg: No edema.      Left lower leg: No edema.   Lymphadenopathy:      Head:      Right side of head: No submental, submandibular, tonsillar, preauricular, posterior auricular or occipital adenopathy.      Left side of head: No submental, submandibular, tonsillar, preauricular, posterior auricular or occipital adenopathy.      Cervical: No cervical adenopathy.   Skin:     General: Skin is warm and dry.   Neurological:      " General: No focal deficit present.      Mental Status: She is alert and oriented to person, place, and time.      Sensory: No sensory deficit.      Motor: Motor function is intact.   Psychiatric:         Attention and Perception: Attention normal.         Mood and Affect: Mood normal.         Speech: Speech normal.         Behavior: Behavior normal. Behavior is cooperative.         Thought Content: Thought content normal.         Cognition and Memory: Cognition normal.         Judgment: Judgment normal.          JUAN LUIS Billingsley MONIQUE [facility]

## 2024-03-08 NOTE — ASSESSMENT & PLAN NOTE
Unremarkable exam of adult female.  Screenings up to date.  Continue regular dental and eye exams, encourage healthy diet and regular exercise.

## 2024-03-10 ENCOUNTER — APPOINTMENT (OUTPATIENT)
Dept: LAB | Age: 44
End: 2024-03-10
Payer: COMMERCIAL

## 2024-03-10 DIAGNOSIS — E78.2 MODERATE MIXED HYPERLIPIDEMIA NOT REQUIRING STATIN THERAPY: ICD-10-CM

## 2024-03-10 DIAGNOSIS — R73.01 IFG (IMPAIRED FASTING GLUCOSE): ICD-10-CM

## 2024-03-10 DIAGNOSIS — I42.8 NONISCHEMIC CARDIOMYOPATHY (HCC): ICD-10-CM

## 2024-03-10 LAB
ALBUMIN SERPL BCP-MCNC: 3.8 G/DL (ref 3.5–5)
ALP SERPL-CCNC: 56 U/L (ref 34–104)
ALT SERPL W P-5'-P-CCNC: 12 U/L (ref 7–52)
ANION GAP SERPL CALCULATED.3IONS-SCNC: 7 MMOL/L
AST SERPL W P-5'-P-CCNC: 14 U/L (ref 13–39)
BASOPHILS # BLD AUTO: 0.04 THOUSANDS/ÂΜL (ref 0–0.1)
BASOPHILS NFR BLD AUTO: 1 % (ref 0–1)
BILIRUB SERPL-MCNC: 0.32 MG/DL (ref 0.2–1)
BUN SERPL-MCNC: 14 MG/DL (ref 5–25)
CALCIUM SERPL-MCNC: 8.5 MG/DL (ref 8.4–10.2)
CHLORIDE SERPL-SCNC: 105 MMOL/L (ref 96–108)
CHOLEST SERPL-MCNC: 214 MG/DL
CO2 SERPL-SCNC: 25 MMOL/L (ref 21–32)
CREAT SERPL-MCNC: 0.52 MG/DL (ref 0.6–1.3)
EOSINOPHIL # BLD AUTO: 0.13 THOUSAND/ÂΜL (ref 0–0.61)
EOSINOPHIL NFR BLD AUTO: 2 % (ref 0–6)
ERYTHROCYTE [DISTWIDTH] IN BLOOD BY AUTOMATED COUNT: 14.1 % (ref 11.6–15.1)
EST. AVERAGE GLUCOSE BLD GHB EST-MCNC: 114 MG/DL
GFR SERPL CREATININE-BSD FRML MDRD: 115 ML/MIN/1.73SQ M
GLUCOSE P FAST SERPL-MCNC: 103 MG/DL (ref 65–99)
HBA1C MFR BLD: 5.6 %
HCT VFR BLD AUTO: 31.8 % (ref 34.8–46.1)
HDLC SERPL-MCNC: 47 MG/DL
HGB BLD-MCNC: 9.8 G/DL (ref 11.5–15.4)
IMM GRANULOCYTES # BLD AUTO: 0.02 THOUSAND/UL (ref 0–0.2)
IMM GRANULOCYTES NFR BLD AUTO: 0 % (ref 0–2)
LDLC SERPL CALC-MCNC: 128 MG/DL (ref 0–100)
LYMPHOCYTES # BLD AUTO: 1.84 THOUSANDS/ÂΜL (ref 0.6–4.47)
LYMPHOCYTES NFR BLD AUTO: 33 % (ref 14–44)
MCH RBC QN AUTO: 25.7 PG (ref 26.8–34.3)
MCHC RBC AUTO-ENTMCNC: 30.8 G/DL (ref 31.4–37.4)
MCV RBC AUTO: 83 FL (ref 82–98)
MONOCYTES # BLD AUTO: 0.63 THOUSAND/ÂΜL (ref 0.17–1.22)
MONOCYTES NFR BLD AUTO: 11 % (ref 4–12)
NEUTROPHILS # BLD AUTO: 2.89 THOUSANDS/ÂΜL (ref 1.85–7.62)
NEUTS SEG NFR BLD AUTO: 53 % (ref 43–75)
NONHDLC SERPL-MCNC: 167 MG/DL
NRBC BLD AUTO-RTO: 0 /100 WBCS
PLATELET # BLD AUTO: 235 THOUSANDS/UL (ref 149–390)
PMV BLD AUTO: 10.9 FL (ref 8.9–12.7)
POTASSIUM SERPL-SCNC: 3.8 MMOL/L (ref 3.5–5.3)
PROT SERPL-MCNC: 6.6 G/DL (ref 6.4–8.4)
RBC # BLD AUTO: 3.82 MILLION/UL (ref 3.81–5.12)
SODIUM SERPL-SCNC: 137 MMOL/L (ref 135–147)
TRIGL SERPL-MCNC: 194 MG/DL
WBC # BLD AUTO: 5.55 THOUSAND/UL (ref 4.31–10.16)

## 2024-03-10 PROCEDURE — 80053 COMPREHEN METABOLIC PANEL: CPT

## 2024-03-10 PROCEDURE — 36415 COLL VENOUS BLD VENIPUNCTURE: CPT

## 2024-03-10 PROCEDURE — 80061 LIPID PANEL: CPT

## 2024-03-10 PROCEDURE — 83036 HEMOGLOBIN GLYCOSYLATED A1C: CPT

## 2024-03-10 PROCEDURE — 85025 COMPLETE CBC W/AUTO DIFF WBC: CPT

## 2024-03-11 ENCOUNTER — REMOTE DEVICE CLINIC VISIT (OUTPATIENT)
Dept: CARDIOLOGY CLINIC | Facility: CLINIC | Age: 44
End: 2024-03-11

## 2024-03-11 DIAGNOSIS — Z95.810 AICD (AUTOMATIC CARDIOVERTER/DEFIBRILLATOR) PRESENT: Primary | ICD-10-CM

## 2024-03-11 PROCEDURE — RECHECK

## 2024-03-11 NOTE — PROGRESS NOTES
Results for orders placed or performed in visit on 03/11/24   Cardiac EP device report    Narrative    MDT/CRT-D (DDD MODE)/ NOT MRI CONDITIONAL  NON-BILLABLE CARELINK TRANSMISSION: BATTERY VOLTAGE NEARING DANTE (~ 1  MOS) . WILL SCHEDULE MONTHLY BATTERY CHECKS. AP <0.1%   98.5% + VSR PACE 1.2%; ALL AVAILABLE LEAD PARAMETERS WITHIN NORMAL LIMITS. NO SIGNIFICANT HIGH RATE EPISODES. 99 V-SENSE MARKERS NOTED FOR ST, FUSION. OPTI-VOL WITHIN NORMAL LIMITS. NORMAL DEVICE FUNCTION.   ES

## 2024-03-13 ENCOUNTER — TELEPHONE (OUTPATIENT)
Age: 44
End: 2024-03-13

## 2024-03-14 ENCOUNTER — ANESTHESIA (OUTPATIENT)
Dept: NON INVASIVE DIAGNOSTICS | Facility: HOSPITAL | Age: 44
End: 2024-03-14
Payer: COMMERCIAL

## 2024-03-14 ENCOUNTER — HOSPITAL ENCOUNTER (OUTPATIENT)
Facility: HOSPITAL | Age: 44
Setting detail: OUTPATIENT SURGERY
Discharge: HOME | End: 2024-03-14
Attending: INTERNAL MEDICINE | Admitting: INTERNAL MEDICINE
Payer: COMMERCIAL

## 2024-03-14 ENCOUNTER — ANESTHESIA EVENT (OUTPATIENT)
Dept: NON INVASIVE DIAGNOSTICS | Facility: HOSPITAL | Age: 44
End: 2024-03-14
Payer: COMMERCIAL

## 2024-03-14 VITALS
TEMPERATURE: 97.3 F | HEART RATE: 81 BPM | SYSTOLIC BLOOD PRESSURE: 107 MMHG | WEIGHT: 182 LBS | DIASTOLIC BLOOD PRESSURE: 61 MMHG | OXYGEN SATURATION: 97 % | RESPIRATION RATE: 17 BRPM | BODY MASS INDEX: 34.36 KG/M2 | HEIGHT: 61 IN

## 2024-03-14 DIAGNOSIS — I42.8 NONISCHEMIC CARDIOMYOPATHY (HCC): ICD-10-CM

## 2024-03-14 LAB
ANION GAP SERPL CALCULATED.3IONS-SCNC: 11 MMOL/L (ref 4–13)
ATRIAL RATE: 79 BPM
ATRIAL RATE: 85 BPM
BUN SERPL-MCNC: 14 MG/DL (ref 5–25)
CALCIUM SERPL-MCNC: 8.7 MG/DL (ref 8.4–10.2)
CHLORIDE SERPL-SCNC: 110 MMOL/L (ref 96–108)
CO2 SERPL-SCNC: 23 MMOL/L (ref 21–32)
CREAT SERPL-MCNC: 0.53 MG/DL (ref 0.6–1.3)
ERYTHROCYTE [DISTWIDTH] IN BLOOD BY AUTOMATED COUNT: 14.2 % (ref 11.6–15.1)
EXT PREGNANCY TEST URINE: NEGATIVE
EXT. CONTROL: NORMAL
GFR SERPL CREATININE-BSD FRML MDRD: 115 ML/MIN/1.73SQ M
GLUCOSE P FAST SERPL-MCNC: 112 MG/DL (ref 65–99)
GLUCOSE SERPL-MCNC: 112 MG/DL (ref 65–140)
HCT VFR BLD AUTO: 31.5 % (ref 34.8–46.1)
HGB BLD-MCNC: 9.7 G/DL (ref 11.5–15.4)
INR PPP: 1.02 (ref 0.84–1.19)
MCH RBC QN AUTO: 25.6 PG (ref 26.8–34.3)
MCHC RBC AUTO-ENTMCNC: 30.8 G/DL (ref 31.4–37.4)
MCV RBC AUTO: 83 FL (ref 82–98)
P AXIS: 35 DEGREES
P AXIS: 45 DEGREES
PLATELET # BLD AUTO: 241 THOUSANDS/UL (ref 149–390)
PMV BLD AUTO: 10.5 FL (ref 8.9–12.7)
POTASSIUM SERPL-SCNC: 4 MMOL/L (ref 3.5–5.3)
PR INTERVAL: 106 MS
PR INTERVAL: 140 MS
PROTHROMBIN TIME: 13.3 SECONDS (ref 11.6–14.5)
QRS AXIS: -13 DEGREES
QRS AXIS: -62 DEGREES
QRSD INTERVAL: 122 MS
QRSD INTERVAL: 124 MS
QT INTERVAL: 400 MS
QT INTERVAL: 410 MS
QTC INTERVAL: 470 MS
QTC INTERVAL: 476 MS
RBC # BLD AUTO: 3.79 MILLION/UL (ref 3.81–5.12)
SODIUM SERPL-SCNC: 144 MMOL/L (ref 135–147)
T WAVE AXIS: 53 DEGREES
T WAVE AXIS: 81 DEGREES
VENTRICULAR RATE: 79 BPM
VENTRICULAR RATE: 85 BPM
WBC # BLD AUTO: 5.54 THOUSAND/UL (ref 4.31–10.16)

## 2024-03-14 PROCEDURE — 93010 ELECTROCARDIOGRAM REPORT: CPT | Performed by: INTERNAL MEDICINE

## 2024-03-14 PROCEDURE — C1882 AICD, OTHER THAN SING/DUAL: HCPCS | Performed by: INTERNAL MEDICINE

## 2024-03-14 PROCEDURE — 93005 ELECTROCARDIOGRAM TRACING: CPT

## 2024-03-14 PROCEDURE — 33264 RMVL & RPLCMT DFB GEN MLT LD: CPT | Performed by: INTERNAL MEDICINE

## 2024-03-14 PROCEDURE — 85610 PROTHROMBIN TIME: CPT | Performed by: PHYSICIAN ASSISTANT

## 2024-03-14 PROCEDURE — 85027 COMPLETE CBC AUTOMATED: CPT | Performed by: PHYSICIAN ASSISTANT

## 2024-03-14 PROCEDURE — 81025 URINE PREGNANCY TEST: CPT | Performed by: PHYSICIAN ASSISTANT

## 2024-03-14 PROCEDURE — 80048 BASIC METABOLIC PNL TOTAL CA: CPT | Performed by: PHYSICIAN ASSISTANT

## 2024-03-14 DEVICE — CRTD DTPB2D1 COBALT HF MRI DF1 USA
Type: IMPLANTABLE DEVICE | Site: CHEST  WALL | Status: FUNCTIONAL
Brand: COBALT™ HF CRT-D MRI SURESCAN™

## 2024-03-14 DEVICE — ENVELOPE CMRM6133 ABSORB LRG MR
Type: IMPLANTABLE DEVICE | Site: CHEST  WALL | Status: FUNCTIONAL
Brand: TYRX™

## 2024-03-14 RX ORDER — CEFAZOLIN SODIUM 2 G/50ML
2000 SOLUTION INTRAVENOUS ONCE
Status: COMPLETED | OUTPATIENT
Start: 2024-03-14 | End: 2024-03-14

## 2024-03-14 RX ORDER — MIDAZOLAM HYDROCHLORIDE 2 MG/2ML
INJECTION, SOLUTION INTRAMUSCULAR; INTRAVENOUS AS NEEDED
Status: DISCONTINUED | OUTPATIENT
Start: 2024-03-14 | End: 2024-03-14

## 2024-03-14 RX ORDER — ACETAMINOPHEN 325 MG/1
650 TABLET ORAL EVERY 4 HOURS PRN
Status: DISCONTINUED | OUTPATIENT
Start: 2024-03-14 | End: 2024-03-14 | Stop reason: HOSPADM

## 2024-03-14 RX ORDER — FENTANYL CITRATE 50 UG/ML
INJECTION, SOLUTION INTRAMUSCULAR; INTRAVENOUS AS NEEDED
Status: DISCONTINUED | OUTPATIENT
Start: 2024-03-14 | End: 2024-03-14

## 2024-03-14 RX ORDER — SODIUM CHLORIDE 9 MG/ML
20 INJECTION, SOLUTION INTRAVENOUS CONTINUOUS
Status: DISCONTINUED | OUTPATIENT
Start: 2024-03-14 | End: 2024-03-14 | Stop reason: HOSPADM

## 2024-03-14 RX ORDER — GENTAMICIN SULFATE 40 MG/ML
INJECTION, SOLUTION INTRAMUSCULAR; INTRAVENOUS CODE/TRAUMA/SEDATION MEDICATION
Status: DISCONTINUED | OUTPATIENT
Start: 2024-03-14 | End: 2024-03-14 | Stop reason: HOSPADM

## 2024-03-14 RX ORDER — PROPOFOL 10 MG/ML
INJECTION, EMULSION INTRAVENOUS AS NEEDED
Status: DISCONTINUED | OUTPATIENT
Start: 2024-03-14 | End: 2024-03-14

## 2024-03-14 RX ORDER — LIDOCAINE HYDROCHLORIDE 10 MG/ML
INJECTION, SOLUTION EPIDURAL; INFILTRATION; INTRACAUDAL; PERINEURAL CODE/TRAUMA/SEDATION MEDICATION
Status: DISCONTINUED | OUTPATIENT
Start: 2024-03-14 | End: 2024-03-14 | Stop reason: HOSPADM

## 2024-03-14 RX ORDER — PROPOFOL 10 MG/ML
INJECTION, EMULSION INTRAVENOUS CONTINUOUS PRN
Status: DISCONTINUED | OUTPATIENT
Start: 2024-03-14 | End: 2024-03-14

## 2024-03-14 RX ADMIN — FENTANYL CITRATE 50 MCG: 50 INJECTION INTRAMUSCULAR; INTRAVENOUS at 09:46

## 2024-03-14 RX ADMIN — SODIUM CHLORIDE 20 ML/HR: 0.9 INJECTION, SOLUTION INTRAVENOUS at 07:44

## 2024-03-14 RX ADMIN — PROPOFOL 50 MG: 10 INJECTION, EMULSION INTRAVENOUS at 09:54

## 2024-03-14 RX ADMIN — ACETAMINOPHEN 650 MG: 325 TABLET ORAL at 11:04

## 2024-03-14 RX ADMIN — FENTANYL CITRATE 50 MCG: 50 INJECTION INTRAMUSCULAR; INTRAVENOUS at 09:25

## 2024-03-14 RX ADMIN — PROPOFOL 20 MG: 10 INJECTION, EMULSION INTRAVENOUS at 10:07

## 2024-03-14 RX ADMIN — PROPOFOL 200 MCG/KG/MIN: 10 INJECTION, EMULSION INTRAVENOUS at 09:31

## 2024-03-14 RX ADMIN — MIDAZOLAM 2 MG: 1 INJECTION INTRAMUSCULAR; INTRAVENOUS at 09:25

## 2024-03-14 RX ADMIN — CEFAZOLIN SODIUM 2000 MG: 2 SOLUTION INTRAVENOUS at 09:31

## 2024-03-14 NOTE — Clinical Note
Prepped: left chest. Prepped with: ChloraPrep. The site was clipped. The patient was draped. 3 MIN. DRY TIME COMPLETE

## 2024-03-14 NOTE — DISCHARGE INSTR - AVS FIRST PAGE
Please refer to post ICD implantation discharge instructions and restrictions below and your ICD booklet/temporary card.     Keep incision dry for one week. Do not use lotions/powders/creams on incision.     Leave outer bandage in place for 1 week - it is water proof, and as long as it is fully adhered to your skin you may shower with it.  If it appears as though the bandage is coming off and/or there is any communication to the area of device incision, please then keep the whole area dry for the remaining week.  After 1 week, please remove by pulling all edges away from the center of the bandage.    Please call the office if you notice redness, swelling, bleeding, or drainage from incision or if you develop fevers.    Implantable Cardioverter Defibrillator      If you have any questions, please call [phone] to speak with a nurse (8:30am-4pm, or [phone] after hours). For appointments, please call [phone].    WHAT YOU SHOULD KNOW:    An implantable cardioverter defibrillator (ICD) is a small device that monitors your heart rate and rhythm. It is commonly placed inside your upper chest region. It may be used if you have a ventricular arrhythmia, which is an irregular, dangerous rhythm from the bottom chamber of your heart. Some arrhythmias may cause your heart to suddenly stop beating. An ICD can give a shock to your heart to make it start beating again, or it can give pacing therapy (also known as pain-free therapy) to return your heart to normal rhythm. It is also a pacemaker, so it will pace your heart if needed to prevent it from beating too slowly.           AFTER YOU LEAVE:      Follow up with your primary healthcare provider or cardiologist as directed: You will need to follow-up to have your ICD checked and make sure you are not having problems. Write down your questions so you remember to ask them during your visits.    Self-care:   Ask about activity: Ask if you need to avoid moving your  shoulder or arm, and for how long. Ask if you should avoid lifting heavy objects. Do not play any contact sports, such as football or wrestling, until your primary healthcare provider (PHP) or cardiologist tells you it is okay. You may only be able to drive for a certain amount of time per day, or during certain hours. Ask when you can return to work.   Care for your skin over the ICD: see above instructions for further details     When you get a shock from your ICD: A shock may feel like someone has hit you, or you may feel a thump in the chest. If someone is touching you when you get a shock, they may feel a tingling feeling. The first time you feel a shock, it may scare you. Sit or lie down and stay calm. Ask someone to stay with you if possible. Please either call your cardiologist or report to an emergency room.    Driving: you are ok to drive 48 hours after generator is changed     Safety instructions when you have an ICD:   Carry an ID card for the ICD: This card has important information about your ICD.    Stay away from magnets or machines with electric fields: This includes MRI machines. Avoid leaning into a car engine or doing welding. These things can interfere with how your ICD works.    Tell airport security you have an ICD: You may need to be searched by hand when you go through a security gate. The security gate or handheld wand could harm your ICD.    Keep an ICD diary: Record when you get a shock and what you were doing before you got the shock. Keep track of how you felt before and after the shock, as well as how many shocks you received. Write down the day and time of each shock. Bring the diary with you when you see your PHP or cardiologist.   Carry medical alert identification: Wear medical alert jewelry or carry a card that says you have an ICD. Ask your PHP or cardiologist where to get these items.    Contact your primary healthcare provider or cardiologist if:   You have a fever.    You feel  1 or more shocks from your ICD and feel fine afterwards.   Your feet or ankles swell.   The area around your ICD is painful or tender after surgery.   The skin around your stitches or staples is red, swollen, or draining pus or fluid.    You have chills, a cough, and feel weak or achy.    You are sad or anxious and find it hard to do your usual activities.   You have questions or concerns about your condition or care.    Seek care immediately or call 911 if:   Your stitches or staples come apart.   Blood soaks through your bandage.   You feel more than 3 shocks in a row from your ICD.   You become weak, dizzy, or faint.   You feel your heart skip beats or beat very fast or slow, but you do not feel a shock from your ICD.   You have chest pain that does not go away with rest or medicine.        © 2014 [employer]. Information is for End User's use only and may not be sold, redistributed or otherwise used for commercial purposes. All illustrations and images included in CareNotes® are the copyrighted property of [employer] or [employer].  The above information is an  only. It is not intended as medical advice for individual conditions or treatments. Talk to your doctor, nurse or pharmacist before following any medical regimen to see if it is safe and effective for you.

## 2024-03-14 NOTE — INTERVAL H&P NOTE
H&P reviewed. After examining the patient I find no changes in the patients condition since the H&P had been written.    Patient presents today to undergo BiV ICD generator change, has reached RRT 3/13/2024.  She has had prior generator change in 2015 with original device being placed in 2008 due to peripartum cardiomyopathy (EF had been at 15% but has recovered to 50% per echo 2022).  It does appear from prior chest x-rays that device has moved a little bit, will defer to attending on incision at time of generator change.    Of note, this generator change was expedited as she will be undergoing hysterectomy for adenomyosis of uterus in a couple weeks.    Vitals:    03/14/24 0731   BP: 134/74   Pulse: 98   Resp: 16   Temp: 97.6 °F (36.4 °C)   SpO2: 95%

## 2024-03-14 NOTE — ANESTHESIA POSTPROCEDURE EVALUATION
Post-Op Assessment Note    CV Status:  Stable  Pain Score: 0    Pain management: adequate       Mental Status:  Alert and awake   Hydration Status:  Euvolemic and stable   PONV Controlled:  None   Airway Patency:  Patent     Post Op Vitals Reviewed: Yes    No anethesia notable event occurred.    Staff: CRNA               /58 (03/14/24 1040)    Temp      Pulse 77 (03/14/24 1040)   Resp 18 (03/14/24 1040)    SpO2 99 % (03/14/24 1040)

## 2024-03-14 NOTE — ANESTHESIA PREPROCEDURE EVALUATION
Procedure:  Cardiac biv icd generator change (Chest)    Relevant Problems   CARDIO   (+) Chest pain on breathing   (+) Left bundle-branch block   (+) Mitral regurgitation   (+) Moderate mixed hyperlipidemia not requiring statin therapy   (+) Pacemaker   (+) Paroxysmal atrial fibrillation (HCC)      NEURO/PSYCH   (+) Anxiety      Cardiovascular/Peripheral Vascular   (+) Cardiomyopathy (HCC)        Physical Exam    Airway    Mallampati score: I  TM Distance: >3 FB  Neck ROM: full     Dental       Cardiovascular  Cardiovascular exam normal    Pulmonary  Pulmonary exam normal     Other Findings  post-pubertal.      Anesthesia Plan  ASA Score- 2     Anesthesia Type- IV sedation with anesthesia with ASA Monitors.         Additional Monitors:     Airway Plan:            Plan Factors-Exercise tolerance (METS): >4 METS.    Chart reviewed. EKG reviewed. Imaging results reviewed. Existing labs reviewed. Patient summary reviewed.    Patient is not a current smoker.  Patient did not smoke on day of surgery.    Obstructive sleep apnea risk education given perioperatively.        Induction- intravenous.    Postoperative Plan- Plan for postoperative opioid use. Planned trial extubation    Informed Consent- Anesthetic plan and risks discussed with patient.  I personally reviewed this patient with the CRNA. Discussed and agreed on the Anesthesia Plan with the CRNA..

## 2024-03-14 NOTE — Clinical Note
The ICD GENERATOR CRTD [place] HF MRI DF1 - [id number] device was inserted. The leads were placed into the connector and visually verified to be in correct position. Injury current obtained.

## 2024-03-17 ENCOUNTER — APPOINTMENT (OUTPATIENT)
Dept: LAB | Age: 44
End: 2024-03-17
Payer: COMMERCIAL

## 2024-03-17 DIAGNOSIS — K75.81 NONALCOHOLIC STEATOHEPATITIS: ICD-10-CM

## 2024-03-17 LAB
ALBUMIN SERPL BCP-MCNC: 4 G/DL (ref 3.5–5)
ALP SERPL-CCNC: 63 U/L (ref 34–104)
ALT SERPL W P-5'-P-CCNC: 12 U/L (ref 7–52)
ANION GAP SERPL CALCULATED.3IONS-SCNC: 10 MMOL/L (ref 4–13)
AST SERPL W P-5'-P-CCNC: 13 U/L (ref 13–39)
BASOPHILS # BLD AUTO: 0.05 THOUSANDS/ÂΜL (ref 0–0.1)
BASOPHILS NFR BLD AUTO: 1 % (ref 0–1)
BILIRUB SERPL-MCNC: 0.39 MG/DL (ref 0.2–1)
BUN SERPL-MCNC: 12 MG/DL (ref 5–25)
CALCIUM SERPL-MCNC: 9 MG/DL (ref 8.4–10.2)
CHLORIDE SERPL-SCNC: 100 MMOL/L (ref 96–108)
CO2 SERPL-SCNC: 25 MMOL/L (ref 21–32)
CREAT SERPL-MCNC: 0.53 MG/DL (ref 0.6–1.3)
EOSINOPHIL # BLD AUTO: 0.12 THOUSAND/ÂΜL (ref 0–0.61)
EOSINOPHIL NFR BLD AUTO: 2 % (ref 0–6)
ERYTHROCYTE [DISTWIDTH] IN BLOOD BY AUTOMATED COUNT: 14 % (ref 11.6–15.1)
GFR SERPL CREATININE-BSD FRML MDRD: 115 ML/MIN/1.73SQ M
GLUCOSE P FAST SERPL-MCNC: 113 MG/DL (ref 65–99)
HCT VFR BLD AUTO: 32.1 % (ref 34.8–46.1)
HGB BLD-MCNC: 9.8 G/DL (ref 11.5–15.4)
IMM GRANULOCYTES # BLD AUTO: 0.03 THOUSAND/UL (ref 0–0.2)
IMM GRANULOCYTES NFR BLD AUTO: 0 % (ref 0–2)
LYMPHOCYTES # BLD AUTO: 2.31 THOUSANDS/ÂΜL (ref 0.6–4.47)
LYMPHOCYTES NFR BLD AUTO: 29 % (ref 14–44)
MCH RBC QN AUTO: 25.3 PG (ref 26.8–34.3)
MCHC RBC AUTO-ENTMCNC: 30.5 G/DL (ref 31.4–37.4)
MCV RBC AUTO: 83 FL (ref 82–98)
MONOCYTES # BLD AUTO: 0.87 THOUSAND/ÂΜL (ref 0.17–1.22)
MONOCYTES NFR BLD AUTO: 11 % (ref 4–12)
NEUTROPHILS # BLD AUTO: 4.51 THOUSANDS/ÂΜL (ref 1.85–7.62)
NEUTS SEG NFR BLD AUTO: 57 % (ref 43–75)
NRBC BLD AUTO-RTO: 0 /100 WBCS
PLATELET # BLD AUTO: 263 THOUSANDS/UL (ref 149–390)
PMV BLD AUTO: 10.9 FL (ref 8.9–12.7)
POTASSIUM SERPL-SCNC: 4.1 MMOL/L (ref 3.5–5.3)
PROT SERPL-MCNC: 6.8 G/DL (ref 6.4–8.4)
RBC # BLD AUTO: 3.87 MILLION/UL (ref 3.81–5.12)
SODIUM SERPL-SCNC: 135 MMOL/L (ref 135–147)
WBC # BLD AUTO: 7.89 THOUSAND/UL (ref 4.31–10.16)

## 2024-03-17 PROCEDURE — 85025 COMPLETE CBC W/AUTO DIFF WBC: CPT

## 2024-03-17 PROCEDURE — 80053 COMPREHEN METABOLIC PANEL: CPT

## 2024-03-17 PROCEDURE — 36415 COLL VENOUS BLD VENIPUNCTURE: CPT

## 2024-03-22 ENCOUNTER — IN-CLINIC DEVICE VISIT (OUTPATIENT)
Dept: CARDIOLOGY CLINIC | Facility: CLINIC | Age: 44
End: 2024-03-22

## 2024-03-22 ENCOUNTER — HOSPITAL ENCOUNTER (OUTPATIENT)
Dept: NON INVASIVE DIAGNOSTICS | Facility: CLINIC | Age: 44
Discharge: HOME | End: 2024-03-22
Payer: COMMERCIAL

## 2024-03-22 VITALS
HEART RATE: 81 BPM | BODY MASS INDEX: 34.36 KG/M2 | WEIGHT: 182 LBS | HEIGHT: 61 IN | SYSTOLIC BLOOD PRESSURE: 107 MMHG | DIASTOLIC BLOOD PRESSURE: 61 MMHG

## 2024-03-22 DIAGNOSIS — I42.8 NONISCHEMIC CARDIOMYOPATHY (HCC): ICD-10-CM

## 2024-03-22 DIAGNOSIS — Z95.810 AICD (AUTOMATIC CARDIOVERTER/DEFIBRILLATOR) PRESENT: Primary | ICD-10-CM

## 2024-03-22 LAB
AORTIC ROOT: 2.4 CM
APICAL FOUR CHAMBER EJECTION FRACTION: 53 %
ASCENDING AORTA: 3.3 CM
BSA FOR ECHO PROCEDURE: 1.81 M2
E WAVE DECELERATION TIME: 177 MS
E/A RATIO: 1.82
FRACTIONAL SHORTENING: 30 (ref 28–44)
INTERVENTRICULAR SEPTUM IN DIASTOLE (PARASTERNAL SHORT AXIS VIEW): 0.9 CM
INTERVENTRICULAR SEPTUM: 0.9 CM (ref 0.6–1.1)
LAAS-AP2: 18.2 CM2
LAAS-AP4: 22.8 CM2
LEFT ATRIUM SIZE: 2.8 CM
LEFT ATRIUM VOLUME (MOD BIPLANE): 63 ML
LEFT ATRIUM VOLUME INDEX (MOD BIPLANE): 34.8 ML/M2
LEFT INTERNAL DIMENSION IN SYSTOLE: 3 CM (ref 2.1–4)
LEFT VENTRICULAR INTERNAL DIMENSION IN DIASTOLE: 4.3 CM (ref 3.5–6)
LEFT VENTRICULAR POSTERIOR WALL IN END DIASTOLE: 1.1 CM
LEFT VENTRICULAR STROKE VOLUME: 47 ML
LVSV (TEICH): 47 ML
MV E'TISSUE VEL-SEP: 11 CM/S
MV PEAK A VEL: 0.6 M/S
MV PEAK E VEL: 109 CM/S
MV STENOSIS PRESSURE HALF TIME: 51 MS
MV VALVE AREA P 1/2 METHOD: 4.31
RA PRESSURE ESTIMATED: 3 MMHG
RIGHT ATRIUM AREA SYSTOLE A4C: 10.2 CM2
RIGHT VENTRICLE ID DIMENSION: 2.9 CM
RV PSP: 31 MMHG
SL CV LEFT ATRIUM LENGTH A2C: 5.4 CM
SL CV LV EF: 55
SL CV PED ECHO LEFT VENTRICLE DIASTOLIC VOLUME (MOD BIPLANE) 2D: 82 ML
SL CV PED ECHO LEFT VENTRICLE SYSTOLIC VOLUME (MOD BIPLANE) 2D: 34 ML
TR MAX PG: 28 MMHG
TR PEAK VELOCITY: 2.6 M/S
TRICUSPID ANNULAR PLANE SYSTOLIC EXCURSION: 2.5 CM
TRICUSPID VALVE PEAK REGURGITATION VELOCITY: 2.62 M/S

## 2024-03-22 PROCEDURE — 99024 POSTOP FOLLOW-UP VISIT: CPT | Performed by: INTERNAL MEDICINE

## 2024-03-22 PROCEDURE — 93306 TTE W/DOPPLER COMPLETE: CPT | Performed by: INTERNAL MEDICINE

## 2024-03-22 PROCEDURE — 93306 TTE W/DOPPLER COMPLETE: CPT

## 2024-03-22 NOTE — PROGRESS NOTES
Results for orders placed or performed in visit on 03/22/24   Cardiac EP device report    Narrative    MDT/CRT-D (DDD MODE)/ NOT MRI CONDITIONAL  DEVICE INTERROGATED IN THE [place] OFFICE. BATTERY VOLTAGE ADEQUATE (10.5 YRS). AP<0.1% BVP 97.9% (VSRP 1.2%) ALL LEAD PARAMETERS WITHIN NORMAL LIMITS. 23 SVT-ST EPISODES W/ AVAILABLE EGM SHOWING SVT VS STACH UP  BPM, LONGEST AT 1 HR 3 MINS. TAKES CARVEDILOL. EF 50% (ECHO 2/17/22). NO PROGRAMMING CHANGES MADE TO DEVICE PARAMETERS. OPTIVOL INITIALIZING. WOUND CHECK: INCISION CLEAN AND DRY WITH EDGES APPROXIMATED; HOME BANDAGE REMOVED, VERY SLIGHT OOZING ON BANDAGE & STERI STRIPS REAPPLIED; WOUND CARE AND RESTRICTIONS REVIEWED WITH PATIENT. NORMAL DEVICE FUNCTION. AM

## 2024-04-09 ENCOUNTER — CONSULT (OUTPATIENT)
Age: 44
End: 2024-04-09
Payer: COMMERCIAL

## 2024-04-09 VITALS
HEIGHT: 61 IN | BODY MASS INDEX: 35.65 KG/M2 | WEIGHT: 188.8 LBS | DIASTOLIC BLOOD PRESSURE: 84 MMHG | SYSTOLIC BLOOD PRESSURE: 124 MMHG

## 2024-04-09 DIAGNOSIS — N92.0 MENORRHAGIA WITH REGULAR CYCLE: Primary | ICD-10-CM

## 2024-04-09 DIAGNOSIS — N94.6 DYSMENORRHEA: ICD-10-CM

## 2024-04-09 DIAGNOSIS — R10.2 PELVIC PAIN: ICD-10-CM

## 2024-04-09 PROCEDURE — 99214 OFFICE O/P EST MOD 30 MIN: CPT | Performed by: OBSTETRICS & GYNECOLOGY

## 2024-04-09 NOTE — PROGRESS NOTES
HPI:    Tara presents for pre-op hysterectomy.   She has gotten cardiac clearance to proceed.     She has painful heavy periods and pelvic pain.   She had a benign EMB as well as a pelvic ultrasound that was suggestive of adenomyosis.    She is interested in definitive surgical management.       OB History          2    Para   2    Term   0       0    AB   0    Living   2         SAB   0    IAB   0    Ectopic   0    Multiple   0    Live Births   2                 Past Medical History:   Diagnosis Date    Allergic rhinitis     Anxiety     Atrial fibrillation (HCC)     paroxysmal    Bunion     Candidal vulvovaginitis     Cardiomyopathy (HCC)     Chest pain     CHF (congestive heart failure) (HCC)     chronic systolic    Coronary artery disease     Fibroid     Hammertoe of right foot     Herpes simplex     Irregular heart beat     LBBB (left bundle branch block)     Normal delivery      son, 2008 daughter    Pacemaker        Past Surgical History:   Procedure Laterality Date    BUNIONECTOMY Bilateral     BUNIONECTOMY Right 2022    Procedure: LAPIDUS BUNIONECTOMY;  Surgeon: Brandon Montemayor DPM;  Location: [facility];  Service: Podiatry    CARDIAC DEFIBRILLATOR PLACEMENT      CARDIAC ELECTROPHYSIOLOGY PROCEDURE N/A 3/14/2024    Procedure: Cardiac biv icd generator change;  Surgeon: Ravindra Moore DO;  [facility]: [facility];  Service: Cardiology    NH EXCISION EXCESSIVE SKIN & SUBQ TISSUE ABDOMEN N/A 2019    Procedure: CIRCUMFERENTIAL ABDOMINOPLASTY;  Surgeon: Froy Gann MD;  Location:  MAIN OR;  Service: Plastics    RECESSION GASTROC ENDOSCOPIC Right 2022    Procedure: RECESSION GASTROC ENDOSCOPIC;  Surgeon: Brandon Montemayor DPM;  Location: [facility];  Service: Podiatry    TOE OSTEOTOMY Right 2022    Procedure: 2ND HAMMERTOE REPAIR;  Surgeon: Brandon Montemayor DPM;  Location: [facility];  Service: Podiatry    TUBAL LIGATION    "        Current Outpatient Medications:     ALPRAZolam (XANAX) 0.5 mg tablet, Take 0.5 tablets (0.25 mg total) by mouth 3 (three) times a day as needed for anxiety, Disp: 20 tablet, Rfl: 0    carvedilol (COREG) 25 mg tablet, TAKE 1 TABLET BY MOUTH TWICE A DAY, Disp: 180 tablet, Rfl: 5    esomeprazole (NexIUM) 40 MG capsule, , Disp: , Rfl:     lisinopril (ZESTRIL) 5 mg tablet, TAKE 1 TABLET BY MOUTH TWICE A DAY, Disp: 180 tablet, Rfl: 3        Objective:    /84 (BP Location: Right arm, Patient Position: Sitting, Cuff Size: Standard)   Ht 5' 1\" (1.549 m)   Wt 85.6 kg (188 lb 12.8 oz)   LMP 2024 (Exact Date) Comment: tubal  BMI 35.67 kg/m²     Physical Exam  Constitutional:       General: She is not in acute distress.     Appearance: Normal appearance. She is obese. She is not ill-appearing.   Genitourinary:      Bladder and urethral meatus normal.      Right Labia: No rash or lesions.     Left Labia: No lesions or rash.       Right Adnexa: not tender and no mass present.     Left Adnexa: not tender and no mass present.     Cervix is parous.      Uterus is not tender or irregular.      Uterus exam comments: Bulbous  .      Uterus is anteverted.      Bladder is not tender.    HENT:      Head: Normocephalic and atraumatic.   Cardiovascular:      Rate and Rhythm: Normal rate.   Pulmonary:      Effort: No respiratory distress.   Abdominal:      General: Abdomen is flat. A surgical scar is present.      Palpations: Abdomen is soft.      Tenderness: There is no abdominal tenderness.      Comments: Scar from abdominoplasty.    Discussed likely supra-umbilical port due to exam and anatomy.    Neurological:      Mental Status: She is alert.   Vitals and nursing note reviewed.            Assessment:     44 y.o.  for Total Laparoscopic Hysterectomy, Bilateral Salpingectomy, Cystoscopy for dysmenorrhea, pelvic pain, heavy periods.    Plan:    Consent obtained today to proceed with Total Laparoscopic " Hysterectomy, Bilateral Salpingectomy, Cystoscopy.  We discussed that this means removal of the uterus, cervix and fallopian tubes.  We discussed benefit of potential decreased risk of ovarian cancer with removal of her fallopian tubes.  We discussed leaving her ovaries in situ as long as they appeared normal.     We discussed risks of surgery including but not limited to:  bleeding, infection, blood clots, wound healing problems, need for laparotomy, damage to surrounding organs not limited to - bladder, bowels, blood vessels, nerves, ureters, ovaries.  We discussed rare complications like fistula and cuff infection.  We discussed All patient questions were answered to her apparent satisfaction and consent form was signed.

## 2024-04-15 ENCOUNTER — TELEPHONE (OUTPATIENT)
Age: 44
End: 2024-04-15

## 2024-04-15 NOTE — TELEPHONE ENCOUNTER
Patient was told you would be calling her to schedule her hysterectomy, she is worried because she hasn't received a call yet.

## 2024-04-17 ENCOUNTER — TELEPHONE (OUTPATIENT)
Dept: GYNECOLOGY | Facility: CLINIC | Age: 44
End: 2024-04-17

## 2024-04-17 NOTE — TELEPHONE ENCOUNTER
----- Message from Erika Rodas MD sent at 2024  9:09 AM EDT -----  Regarding: Surgery  [facility] GYN Department  Surgery Scheduling Sheet    Patient Name: Tara Marquez  : 1980    Provider: Erika Rodas MD     Needed: no; Language: N/A    Procedure: exam under anesthesia, total laparoscopic hysterectomy, cystoscopy, and bilateral  salpingectomy    Diagnosis: dysmenorrhea, pelvic pain, menorrhagia    Special Needs or Equipment: Cosurgeon    Anesthesia: General anesthesia    Length of stay: outpatient  Does patient have comorbid conditions that will require close perioperative monitoring prior to safe discharge: no    The patient has comorbid conditions that will require close perioperative monitoring prior to safe discharge, including N/A.     This may require acute care beyond the usual and routine recovery period. As such, inpatient admission post-operatively is expected and appropriate, and anticipated hospital length of stay will be >2 midnights.    Pre-Admission Testing Needed: no   Labs that should be ordered: PAT labs    Order PAT that is recommended in prep for procedure?: Yes    Medical Clearance Needed: yes; Provider: Cardiology - should already be on chart - was cleared prior to pre-op    MA Form Signed (tubals/hysterectomy): Not Indicated    Surgical Drink Given: yes     How many days out of work: 6 week(s)     How many days no drivin week(s)       Is pre op appt needed?  no  Interval for post op appt: 2 and 6  week(s)     For Surgical Scheduler:     Surgery Scheduled On:  [place]: [facility]    Pre-op Appt:   Post op Appt:  Consult/Medical clearance appt:

## 2024-06-21 ENCOUNTER — REMOTE DEVICE CLINIC VISIT (OUTPATIENT)
Dept: CARDIOLOGY CLINIC | Facility: CLINIC | Age: 44
End: 2024-06-21
Payer: COMMERCIAL

## 2024-06-21 DIAGNOSIS — Z95.810 PRESENCE OF IMPLANTABLE CARDIOVERTER-DEFIBRILLATOR (ICD): Primary | ICD-10-CM

## 2024-06-21 PROCEDURE — 93295 DEV INTERROG REMOTE 1/2/MLT: CPT | Performed by: INTERNAL MEDICINE

## 2024-06-21 PROCEDURE — 93296 REM INTERROG EVL PM/IDS: CPT | Performed by: INTERNAL MEDICINE

## 2024-06-21 NOTE — PROGRESS NOTES
MDT/CRT-D (DDD MODE)/ NOT MRI CONDITIONAL   CARELINK TRANSMISSION: BATTERY VOLTAGE ADEQUATE (10.4 YRS). AP <.1%  98.4% & VSR PACE 1.2%. ALL AVAILABLE LEAD PARAMETERS WITHIN NORMAL LIMITS. 151 SVT-ST EPISODES W/ST ON EGM'S AVAILABLE - MAX EPISODE 02:31 MINS W/AVG RATES 136-150 BPM; 293 V-SENSE EPISODES W/MARKERS FOR ST, FUSION - MAX EPISODE 02:23 MINS W/AVG RATES 136-143BPM; EF 55% (3/22/2024 ECHO); PATIENT TAKING CARVEDILOL. OPTI-VOL WITHIN NORMAL LIMITS. NORMAL DEVICE FUNCTION.  ES

## 2024-06-26 ENCOUNTER — HOSPITAL ENCOUNTER (OUTPATIENT)
Dept: RADIOLOGY | Age: 44
Discharge: HOME | End: 2024-06-26
Payer: COMMERCIAL

## 2024-06-26 VITALS — BODY MASS INDEX: 35.5 KG/M2 | WEIGHT: 188 LBS | HEIGHT: 61 IN

## 2024-06-26 DIAGNOSIS — Z12.31 SCREENING MAMMOGRAM FOR BREAST CANCER: ICD-10-CM

## 2024-06-26 PROCEDURE — 77067 SCR MAMMO BI INCL CAD: CPT

## 2024-06-26 PROCEDURE — 77063 BREAST TOMOSYNTHESIS BI: CPT

## 2024-07-31 ENCOUNTER — APPOINTMENT (OUTPATIENT)
Dept: LAB | Age: 44
End: 2024-07-31
Payer: COMMERCIAL

## 2024-07-31 ENCOUNTER — LAB REQUISITION (OUTPATIENT)
Dept: LAB | Facility: HOSPITAL | Age: 44
End: 2024-07-31
Payer: COMMERCIAL

## 2024-07-31 DIAGNOSIS — R10.2 PELVIC AND PERINEAL PAIN: ICD-10-CM

## 2024-07-31 DIAGNOSIS — R10.2 PELVIC PAIN IN FEMALE: ICD-10-CM

## 2024-07-31 DIAGNOSIS — N94.6 DYSMENORRHEA: ICD-10-CM

## 2024-07-31 DIAGNOSIS — N94.6 DYSMENORRHEA, UNSPECIFIED: ICD-10-CM

## 2024-07-31 DIAGNOSIS — N92.0 MENORRHAGIA WITH REGULAR CYCLE: ICD-10-CM

## 2024-07-31 DIAGNOSIS — N92.0 EXCESSIVE AND FREQUENT MENSTRUATION WITH REGULAR CYCLE: ICD-10-CM

## 2024-07-31 LAB
ABO GROUP BLD: NORMAL
ANION GAP SERPL CALCULATED.3IONS-SCNC: 10 MMOL/L (ref 4–13)
BASOPHILS # BLD AUTO: 0.04 THOUSANDS/ÂΜL (ref 0–0.1)
BASOPHILS NFR BLD AUTO: 1 % (ref 0–1)
BLD GP AB SCN SERPL QL: NEGATIVE
BUN SERPL-MCNC: 12 MG/DL (ref 5–25)
CALCIUM SERPL-MCNC: 8.6 MG/DL (ref 8.4–10.2)
CHLORIDE SERPL-SCNC: 105 MMOL/L (ref 96–108)
CO2 SERPL-SCNC: 23 MMOL/L (ref 21–32)
CREAT SERPL-MCNC: 0.53 MG/DL (ref 0.6–1.3)
EOSINOPHIL # BLD AUTO: 0.16 THOUSAND/ÂΜL (ref 0–0.61)
EOSINOPHIL NFR BLD AUTO: 3 % (ref 0–6)
ERYTHROCYTE [DISTWIDTH] IN BLOOD BY AUTOMATED COUNT: 15.1 % (ref 11.6–15.1)
GFR SERPL CREATININE-BSD FRML MDRD: 114 ML/MIN/1.73SQ M
GLUCOSE P FAST SERPL-MCNC: 123 MG/DL (ref 65–99)
HCT VFR BLD AUTO: 34.6 % (ref 34.8–46.1)
HGB BLD-MCNC: 10.7 G/DL (ref 11.5–15.4)
IMM GRANULOCYTES # BLD AUTO: 0.02 THOUSAND/UL (ref 0–0.2)
IMM GRANULOCYTES NFR BLD AUTO: 0 % (ref 0–2)
LYMPHOCYTES # BLD AUTO: 1.63 THOUSANDS/ÂΜL (ref 0.6–4.47)
LYMPHOCYTES NFR BLD AUTO: 27 % (ref 14–44)
MCH RBC QN AUTO: 25.8 PG (ref 26.8–34.3)
MCHC RBC AUTO-ENTMCNC: 30.9 G/DL (ref 31.4–37.4)
MCV RBC AUTO: 83 FL (ref 82–98)
MONOCYTES # BLD AUTO: 0.64 THOUSAND/ÂΜL (ref 0.17–1.22)
MONOCYTES NFR BLD AUTO: 11 % (ref 4–12)
NEUTROPHILS # BLD AUTO: 3.47 THOUSANDS/ÂΜL (ref 1.85–7.62)
NEUTS SEG NFR BLD AUTO: 58 % (ref 43–75)
NRBC BLD AUTO-RTO: 0 /100 WBCS
PLATELET # BLD AUTO: 240 THOUSANDS/UL (ref 149–390)
PMV BLD AUTO: 11.5 FL (ref 8.9–12.7)
POTASSIUM SERPL-SCNC: 4.4 MMOL/L (ref 3.5–5.3)
RBC # BLD AUTO: 4.15 MILLION/UL (ref 3.81–5.12)
RH BLD: POSITIVE
SODIUM SERPL-SCNC: 138 MMOL/L (ref 135–147)
SPECIMEN EXPIRATION DATE: NORMAL
WBC # BLD AUTO: 5.96 THOUSAND/UL (ref 4.31–10.16)

## 2024-07-31 PROCEDURE — 85025 COMPLETE CBC W/AUTO DIFF WBC: CPT

## 2024-07-31 PROCEDURE — 36415 COLL VENOUS BLD VENIPUNCTURE: CPT

## 2024-07-31 PROCEDURE — 80048 BASIC METABOLIC PNL TOTAL CA: CPT

## 2024-07-31 PROCEDURE — 86900 BLOOD TYPING SEROLOGIC ABO: CPT | Performed by: OBSTETRICS & GYNECOLOGY

## 2024-07-31 PROCEDURE — 86901 BLOOD TYPING SEROLOGIC RH(D): CPT | Performed by: OBSTETRICS & GYNECOLOGY

## 2024-07-31 PROCEDURE — 86850 RBC ANTIBODY SCREEN: CPT | Performed by: OBSTETRICS & GYNECOLOGY

## 2024-08-02 NOTE — PRE-PROCEDURE INSTRUCTIONS
Pre-Surgery Instructions:   Medication Instructions    ALPRAZolam (XANAX) 0.5 mg tablet Uses PRN- OK to take day of surgery    carvedilol (COREG) 25 mg tablet Take day of surgery.    lisinopril (ZESTRIL) 5 mg tablet Hold day of surgery.    Medication instructions for day surgery reviewed. Please use only a sip of water to take your instructed medications. Avoid all over the counter vitamins, supplements and NSAIDS for one week prior to surgery per anesthesia guidelines. Tylenol is ok to take as needed.     You will receive a call one business day prior to surgery with an arrival time and hospital directions. If your surgery is scheduled on a Monday, the hospital will be calling you on the Friday prior to your surgery. If you have not heard from anyone by 8pm, please call the hospital supervisor through the hospital  at [phone]. ([place] [phone] or [place] [phone]).    Do not eat or drink anything after midnight the night before your surgery, including candy, mints, lifesavers, or chewing gum. Do not drink alcohol 24hrs before your surgery. Try not to smoke at least 24hrs before your surgery.       Follow the pre surgery showering instructions as listed in the “My Surgical Experience Booklet” or otherwise provided by your surgeon's office. Do not use a blade to shave the surgical area 1 week before surgery. It is okay to use a clean electric clippers up to 24 hours before surgery. Do not apply any lotions, creams, including makeup, cologne, deodorant, or perfumes after showering on the day of your surgery. Do not use dry shampoo, hair spray, hair gel, or any type of hair products.     No contact lenses, eye make-up, or artificial eyelashes. Remove nail polish, including gel polish, and any artificial, gel, or acrylic nails if possible. Remove all jewelry including rings and body piercing jewelry.     Wear causal clothing that is easy to take on and off. Consider your type of surgery.    Keep  any valuables, jewelry, piercings at home. Please bring any specially ordered equipment (sling, braces) if indicated.    Arrange for a responsible person to drive you to and from the hospital on the day of your surgery. Please confirm the visitor policy for the day of your procedure when you receive your phone call with an arrival time.     Call the surgeon's office with any new illnesses, exposures, or additional questions prior to surgery.    Please reference your “My Surgical Experience Booklet” for additional information to prepare for your upcoming surgery.    Pt has surgical soap.  ERAS reviewed.

## 2024-08-07 ENCOUNTER — ANESTHESIA EVENT (OUTPATIENT)
Dept: PERIOP | Facility: HOSPITAL | Age: 44
End: 2024-08-07
Payer: COMMERCIAL

## 2024-08-07 ENCOUNTER — HOSPITAL ENCOUNTER (OUTPATIENT)
Dept: MAMMOGRAPHY | Facility: CLINIC | Age: 44
Discharge: HOME | End: 2024-08-07
Payer: COMMERCIAL

## 2024-08-07 ENCOUNTER — HOSPITAL ENCOUNTER (OUTPATIENT)
Dept: ULTRASOUND IMAGING | Facility: CLINIC | Age: 44
Discharge: HOME | End: 2024-08-07
Payer: COMMERCIAL

## 2024-08-07 VITALS — WEIGHT: 192 LBS | HEIGHT: 61 IN | BODY MASS INDEX: 36.25 KG/M2

## 2024-08-07 DIAGNOSIS — R92.8 ABNORMAL MAMMOGRAM: ICD-10-CM

## 2024-08-07 PROCEDURE — G0279 TOMOSYNTHESIS, MAMMO: HCPCS

## 2024-08-07 PROCEDURE — 77065 DX MAMMO INCL CAD UNI: CPT

## 2024-08-12 ENCOUNTER — HOSPITAL ENCOUNTER (OUTPATIENT)
Facility: HOSPITAL | Age: 44
Setting detail: OUTPATIENT SURGERY
Discharge: HOME | End: 2024-08-12
Attending: OBSTETRICS & GYNECOLOGY | Admitting: OBSTETRICS & GYNECOLOGY
Payer: COMMERCIAL

## 2024-08-12 ENCOUNTER — ANESTHESIA (OUTPATIENT)
Dept: PERIOP | Facility: HOSPITAL | Age: 44
End: 2024-08-12
Payer: COMMERCIAL

## 2024-08-12 VITALS
DIASTOLIC BLOOD PRESSURE: 62 MMHG | OXYGEN SATURATION: 98 % | HEIGHT: 61 IN | WEIGHT: 196.4 LBS | RESPIRATION RATE: 19 BRPM | TEMPERATURE: 98.2 F | HEART RATE: 78 BPM | BODY MASS INDEX: 37.08 KG/M2 | SYSTOLIC BLOOD PRESSURE: 111 MMHG

## 2024-08-12 DIAGNOSIS — N92.0 MENORRHAGIA WITH REGULAR CYCLE: ICD-10-CM

## 2024-08-12 DIAGNOSIS — R10.2 PELVIC PAIN IN FEMALE: ICD-10-CM

## 2024-08-12 DIAGNOSIS — Z90.710 S/P LAPAROSCOPIC HYSTERECTOMY: Primary | ICD-10-CM

## 2024-08-12 DIAGNOSIS — N94.6 DYSMENORRHEA: ICD-10-CM

## 2024-08-12 LAB
ABO GROUP BLD: NORMAL
EXT PREGNANCY TEST URINE: NEGATIVE
EXT. CONTROL: NORMAL
RH BLD: POSITIVE

## 2024-08-12 PROCEDURE — NC001 PR NO CHARGE: Performed by: OBSTETRICS & GYNECOLOGY

## 2024-08-12 PROCEDURE — 58571 TLH W/T/O 250 G OR LESS: CPT | Performed by: OBSTETRICS & GYNECOLOGY

## 2024-08-12 PROCEDURE — 88307 TISSUE EXAM BY PATHOLOGIST: CPT | Performed by: PATHOLOGY

## 2024-08-12 PROCEDURE — 81025 URINE PREGNANCY TEST: CPT | Performed by: OBSTETRICS & GYNECOLOGY

## 2024-08-12 RX ORDER — CEFAZOLIN SODIUM 2 G/50ML
2000 SOLUTION INTRAVENOUS ONCE
Status: COMPLETED | OUTPATIENT
Start: 2024-08-12 | End: 2024-08-12

## 2024-08-12 RX ORDER — PHENAZOPYRIDINE HYDROCHLORIDE 100 MG/1
100 TABLET, FILM COATED ORAL
Status: COMPLETED | OUTPATIENT
Start: 2024-08-12 | End: 2024-08-12

## 2024-08-12 RX ORDER — SODIUM CHLORIDE, SODIUM LACTATE, POTASSIUM CHLORIDE, CALCIUM CHLORIDE 600; 310; 30; 20 MG/100ML; MG/100ML; MG/100ML; MG/100ML
125 INJECTION, SOLUTION INTRAVENOUS CONTINUOUS
Status: DISCONTINUED | OUTPATIENT
Start: 2024-08-12 | End: 2024-08-12 | Stop reason: HOSPADM

## 2024-08-12 RX ORDER — HYDROMORPHONE HCL/PF 1 MG/ML
SYRINGE (ML) INJECTION AS NEEDED
Status: DISCONTINUED | OUTPATIENT
Start: 2024-08-12 | End: 2024-08-12

## 2024-08-12 RX ORDER — HYDROMORPHONE HCL/PF 1 MG/ML
0.5 SYRINGE (ML) INJECTION
Status: DISCONTINUED | OUTPATIENT
Start: 2024-08-12 | End: 2024-08-12 | Stop reason: HOSPADM

## 2024-08-12 RX ORDER — GABAPENTIN 100 MG/1
100 CAPSULE ORAL ONCE
Status: COMPLETED | OUTPATIENT
Start: 2024-08-12 | End: 2024-08-12

## 2024-08-12 RX ORDER — CELECOXIB 100 MG/1
200 CAPSULE ORAL ONCE
Status: COMPLETED | OUTPATIENT
Start: 2024-08-12 | End: 2024-08-12

## 2024-08-12 RX ORDER — PROPOFOL 10 MG/ML
INJECTION, EMULSION INTRAVENOUS AS NEEDED
Status: DISCONTINUED | OUTPATIENT
Start: 2024-08-12 | End: 2024-08-12

## 2024-08-12 RX ORDER — ONDANSETRON 2 MG/ML
INJECTION INTRAMUSCULAR; INTRAVENOUS AS NEEDED
Status: DISCONTINUED | OUTPATIENT
Start: 2024-08-12 | End: 2024-08-12

## 2024-08-12 RX ORDER — SODIUM CHLORIDE, SODIUM LACTATE, POTASSIUM CHLORIDE, CALCIUM CHLORIDE 600; 310; 30; 20 MG/100ML; MG/100ML; MG/100ML; MG/100ML
INJECTION, SOLUTION INTRAVENOUS CONTINUOUS PRN
Status: DISCONTINUED | OUTPATIENT
Start: 2024-08-12 | End: 2024-08-12

## 2024-08-12 RX ORDER — DEXAMETHASONE SODIUM PHOSPHATE 10 MG/ML
INJECTION, SOLUTION INTRAMUSCULAR; INTRAVENOUS AS NEEDED
Status: DISCONTINUED | OUTPATIENT
Start: 2024-08-12 | End: 2024-08-12

## 2024-08-12 RX ORDER — SCOLOPAMINE TRANSDERMAL SYSTEM 1 MG/1
1 PATCH, EXTENDED RELEASE TRANSDERMAL ONCE
Status: COMPLETED | OUTPATIENT
Start: 2024-08-12 | End: 2024-08-12

## 2024-08-12 RX ORDER — FENTANYL CITRATE/PF 50 MCG/ML
25 SYRINGE (ML) INJECTION
Status: DISCONTINUED | OUTPATIENT
Start: 2024-08-12 | End: 2024-08-12 | Stop reason: HOSPADM

## 2024-08-12 RX ORDER — SODIUM CHLORIDE, SODIUM LACTATE, POTASSIUM CHLORIDE, CALCIUM CHLORIDE 600; 310; 30; 20 MG/100ML; MG/100ML; MG/100ML; MG/100ML
100 INJECTION, SOLUTION INTRAVENOUS CONTINUOUS
Status: CANCELLED | OUTPATIENT
Start: 2024-08-12

## 2024-08-12 RX ORDER — IBUPROFEN 600 MG/1
600 TABLET, FILM COATED ORAL EVERY 6 HOURS PRN
Start: 2024-08-12

## 2024-08-12 RX ORDER — IBUPROFEN 600 MG/1
600 TABLET, FILM COATED ORAL EVERY 6 HOURS PRN
Status: DISCONTINUED | OUTPATIENT
Start: 2024-08-12 | End: 2024-08-12 | Stop reason: HOSPADM

## 2024-08-12 RX ORDER — ACETAMINOPHEN 325 MG/1
975 TABLET ORAL EVERY 6 HOURS PRN
Status: DISCONTINUED | OUTPATIENT
Start: 2024-08-12 | End: 2024-08-12 | Stop reason: HOSPADM

## 2024-08-12 RX ORDER — LIDOCAINE HYDROCHLORIDE 10 MG/ML
INJECTION, SOLUTION EPIDURAL; INFILTRATION; INTRACAUDAL; PERINEURAL AS NEEDED
Status: DISCONTINUED | OUTPATIENT
Start: 2024-08-12 | End: 2024-08-12

## 2024-08-12 RX ORDER — OXYCODONE HYDROCHLORIDE 5 MG/1
10 TABLET ORAL EVERY 4 HOURS PRN
Status: DISCONTINUED | OUTPATIENT
Start: 2024-08-12 | End: 2024-08-12 | Stop reason: HOSPADM

## 2024-08-12 RX ORDER — ROCURONIUM BROMIDE 10 MG/ML
INJECTION, SOLUTION INTRAVENOUS AS NEEDED
Status: DISCONTINUED | OUTPATIENT
Start: 2024-08-12 | End: 2024-08-12

## 2024-08-12 RX ORDER — FENTANYL CITRATE 50 UG/ML
INJECTION, SOLUTION INTRAMUSCULAR; INTRAVENOUS AS NEEDED
Status: DISCONTINUED | OUTPATIENT
Start: 2024-08-12 | End: 2024-08-12

## 2024-08-12 RX ORDER — OXYCODONE HYDROCHLORIDE 5 MG/1
5 TABLET ORAL EVERY 4 HOURS PRN
Qty: 12 TABLET | Refills: 0 | Status: SHIPPED | OUTPATIENT
Start: 2024-08-12 | End: 2024-08-22

## 2024-08-12 RX ORDER — ACETAMINOPHEN 325 MG/1
975 TABLET ORAL ONCE
Status: COMPLETED | OUTPATIENT
Start: 2024-08-12 | End: 2024-08-12

## 2024-08-12 RX ORDER — MIDAZOLAM HYDROCHLORIDE 2 MG/2ML
INJECTION, SOLUTION INTRAMUSCULAR; INTRAVENOUS AS NEEDED
Status: DISCONTINUED | OUTPATIENT
Start: 2024-08-12 | End: 2024-08-12

## 2024-08-12 RX ORDER — OXYCODONE HYDROCHLORIDE 5 MG/1
5 TABLET ORAL EVERY 4 HOURS PRN
Status: DISCONTINUED | OUTPATIENT
Start: 2024-08-12 | End: 2024-08-12 | Stop reason: HOSPADM

## 2024-08-12 RX ORDER — ACETAMINOPHEN 325 MG/1
650 TABLET ORAL EVERY 6 HOURS PRN
Start: 2024-08-12

## 2024-08-12 RX ORDER — ONDANSETRON 2 MG/ML
4 INJECTION INTRAMUSCULAR; INTRAVENOUS ONCE AS NEEDED
Status: DISCONTINUED | OUTPATIENT
Start: 2024-08-12 | End: 2024-08-12 | Stop reason: HOSPADM

## 2024-08-12 RX ADMIN — ROCURONIUM BROMIDE 50 MG: 10 INJECTION, SOLUTION INTRAVENOUS at 11:52

## 2024-08-12 RX ADMIN — DEXAMETHASONE SODIUM PHOSPHATE 10 MG: 10 INJECTION, SOLUTION INTRAMUSCULAR; INTRAVENOUS at 11:56

## 2024-08-12 RX ADMIN — FENTANYL CITRATE 25 MCG: 50 INJECTION INTRAMUSCULAR; INTRAVENOUS at 14:40

## 2024-08-12 RX ADMIN — ONDANSETRON 4 MG: 2 INJECTION INTRAMUSCULAR; INTRAVENOUS at 13:11

## 2024-08-12 RX ADMIN — LIDOCAINE HYDROCHLORIDE 50 MG: 10 INJECTION, SOLUTION EPIDURAL; INFILTRATION; INTRACAUDAL; PERINEURAL at 11:52

## 2024-08-12 RX ADMIN — PHENYLEPHRINE HYDROCHLORIDE 50 MCG/MIN: 10 INJECTION, SOLUTION INTRAVENOUS at 11:59

## 2024-08-12 RX ADMIN — PHENAZOPYRIDINE 100 MG: 100 TABLET ORAL at 10:42

## 2024-08-12 RX ADMIN — CELECOXIB 200 MG: 100 CAPSULE ORAL at 10:41

## 2024-08-12 RX ADMIN — GABAPENTIN 100 MG: 100 CAPSULE ORAL at 10:41

## 2024-08-12 RX ADMIN — SODIUM CHLORIDE, SODIUM LACTATE, POTASSIUM CHLORIDE, AND CALCIUM CHLORIDE: .6; .31; .03; .02 INJECTION, SOLUTION INTRAVENOUS at 13:12

## 2024-08-12 RX ADMIN — PROPOFOL 150 MG: 10 INJECTION, EMULSION INTRAVENOUS at 11:52

## 2024-08-12 RX ADMIN — ROCURONIUM BROMIDE 20 MG: 10 INJECTION, SOLUTION INTRAVENOUS at 12:48

## 2024-08-12 RX ADMIN — HYDROMORPHONE HYDROCHLORIDE 0.5 MG: 1 INJECTION, SOLUTION INTRAMUSCULAR; INTRAVENOUS; SUBCUTANEOUS at 13:29

## 2024-08-12 RX ADMIN — HYDROMORPHONE HYDROCHLORIDE 0.5 MG: 1 INJECTION, SOLUTION INTRAMUSCULAR; INTRAVENOUS; SUBCUTANEOUS at 13:41

## 2024-08-12 RX ADMIN — SODIUM CHLORIDE, SODIUM LACTATE, POTASSIUM CHLORIDE, AND CALCIUM CHLORIDE: .6; .31; .03; .02 INJECTION, SOLUTION INTRAVENOUS at 11:30

## 2024-08-12 RX ADMIN — OXYCODONE HYDROCHLORIDE 5 MG: 5 TABLET ORAL at 15:13

## 2024-08-12 RX ADMIN — FENTANYL CITRATE 50 MCG: 50 INJECTION, SOLUTION INTRAMUSCULAR; INTRAVENOUS at 12:17

## 2024-08-12 RX ADMIN — FENTANYL CITRATE 25 MCG: 50 INJECTION INTRAMUSCULAR; INTRAVENOUS at 14:31

## 2024-08-12 RX ADMIN — SCOPALAMINE 1 PATCH: 1 PATCH, EXTENDED RELEASE TRANSDERMAL at 11:16

## 2024-08-12 RX ADMIN — MIDAZOLAM 2 MG: 1 INJECTION INTRAMUSCULAR; INTRAVENOUS at 11:47

## 2024-08-12 RX ADMIN — FENTANYL CITRATE 50 MCG: 50 INJECTION, SOLUTION INTRAMUSCULAR; INTRAVENOUS at 11:52

## 2024-08-12 RX ADMIN — SUGAMMADEX 200 MG: 100 INJECTION, SOLUTION INTRAVENOUS at 13:32

## 2024-08-12 RX ADMIN — ACETAMINOPHEN 975 MG: 325 TABLET, FILM COATED ORAL at 10:40

## 2024-08-12 RX ADMIN — CEFAZOLIN SODIUM 2000 MG: 2 SOLUTION INTRAVENOUS at 12:03

## 2024-08-12 NOTE — ANESTHESIA PREPROCEDURE EVALUATION
Procedure:  HYSTERECTOMY LAPAROSCOPIC TOTAL (LTH) WITH BILATERAL SALPINGECTOMY (Abdomen)  CYSTOSCOPY (Bladder)    Relevant Problems   ANESTHESIA (within normal limits)      CARDIO   (+) Left bundle-branch block   (+) Mitral regurgitation   (+) Moderate mixed hyperlipidemia not requiring statin therapy   (+) Pacemaker   (+) Paroxysmal atrial fibrillation (HCC)      NEURO/PSYCH   (+) Anxiety      Cardiovascular/Peripheral Vascular   (+) Cardiomyopathy (HCC)      Other   (+) History of motion sickness     Lab Results   Component Value Date    WBC 5.96 07/31/2024    HGB 10.7 (L) 07/31/2024     07/31/2024     Lab Results   Component Value Date    SODIUM 138 07/31/2024    K 4.4 07/31/2024    BUN 12 07/31/2024    CREATININE 0.53 (L) 07/31/2024    EGFR 114 07/31/2024     Lab Results   Component Value Date    PTT 28 05/09/2019      Lab Results   Component Value Date    INR 1.02 03/14/2024       Lab Results   Component Value Date    HGBA1C 5.6 03/10/2024       Type and Screen:  Lab Results   Component Value Date    ABO A 07/31/2024    RH Positive 07/31/2024    ABS Negative 07/31/2024    SPECIMEXP [id number] 07/31/2024 03/22/24 1507   Echo complete w/ contrast if indicated (Final result)       Impression  No impression found.      Narrative    Left Ventricle: Left ventricular cavity size is normal. Wall thickness  is normal. The left ventricular ejection fraction is 55%. Systolic  function is normal. Wall motion is normal. Diastolic function is normal.    Right Ventricle: Right ventricular cavity size is normal. Systolic  function is normal.    Left Atrium: The atrium is mildly dilated (35-41 mL/m2).    Mitral Valve: There is mild regurgitation.    Tricuspid Valve: There is mild regurgitation.        Physical Exam    Airway    Mallampati score: I  TM Distance: >3 FB  Neck ROM: full     Dental   No notable dental hx     Cardiovascular  Cardiovascular exam normal    Pulmonary  Pulmonary exam normal     Other  Findings  post-pubertal.      Anesthesia Plan  ASA Score- 3     Anesthesia Type- general with ASA Monitors.         Additional Monitors:     Airway Plan: ETT.    Comment: I discussed risks (reviewed with patient on the anesthesia consent form), benefits and alternatives for General Anesthesia.  These risks did include breathing tube remaining in place if not strong enough, PONV, damage to lips and teeth, sore throat, eye injury or blindness, risk of heart attack or stroke that may lead to death.  .       Plan Factors-Exercise tolerance (METS): >4 METS.    Chart reviewed. EKG reviewed.  Existing labs reviewed. Patient summary reviewed.                  Induction- intravenous.    Postoperative Plan- Plan for postoperative opioid use.         Informed Consent- Anesthetic plan and risks discussed with patient.  I personally reviewed this patient with the CRNA. Discussed and agreed on the Anesthesia Plan with the CRNA..

## 2024-08-12 NOTE — INTERVAL H&P NOTE
H&P reviewed. After examining the patient I find no changes in the patients condition since the H&P had been written.    Vitals:    08/12/24 1031   BP: 103/57   Pulse: 81   Resp: 16   Temp: 98.3 °F (36.8 °C)   SpO2: 97%

## 2024-08-12 NOTE — ANESTHESIA POSTPROCEDURE EVALUATION
Post-Op Assessment Note    CV Status:  Stable    Pain management: adequate       Mental Status:  Lethargic and sleepy   Hydration Status:  Stable   PONV Controlled:  None   Airway Patency:  Patent     Post Op Vitals Reviewed: Yes    No anethesia notable event occurred.    Staff: Anesthesiologist, CRNA   Comments: vss              BP   110/60   Temp   98.2   Pulse  66   Resp   16   SpO2   98      Complex Repair And O-T Advancement Flap Text: The defect edges were debeveled with a #15 scalpel blade.  The primary defect was closed partially with a complex linear closure.  Given the location of the remaining defect, shape of the defect and the proximity to free margins an O-T advancement flap was deemed most appropriate for complete closure of the defect.  Using a sterile surgical marker, an appropriate advancement flap was drawn incorporating the defect and placing the expected incisions within the relaxed skin tension lines where possible.    The area thus outlined was incised deep to adipose tissue with a #15 scalpel blade.  The skin margins were undermined to an appropriate distance in all directions utilizing iris scissors.

## 2024-08-12 NOTE — H&P
H&P Exam - Gynecology   Tara Marquez 44 y.o. female MRN: [id number]  Unit/Bed#:  Encounter: [id number]    No chief complaint on file.        History of Present Illness   HX and PE limited by: n/a  HPI:  Tara Marquez is a 44 y.o. female who presents for her scheduled surgery.  She has ongoing painful heavy periods as well as pelvic pain.  She had a workup with a benign endometrial biopsy and ultrasound suggestive of adenomyosis.  She opted for definitive management with an exam under anesthesia, total laparoscopic hysterectomy, bilateral salpingectomy, cystoscopy.  Please see office visit from 4/9/2024 for full counseling and details.     Review of Systems   Genitourinary:  Positive for menstrual problem and pelvic pain.       Historical Information   Past Medical History:   Diagnosis Date    Allergic rhinitis     Anxiety     Atrial fibrillation (HCC)     paroxysmal    Bunion 1985    Candidal vulvovaginitis     Cardiomyopathy (HCC)     Chest pain     CHF (congestive heart failure) (HCC)     chronic systolic    Coronary artery disease     Fibroid     GERD (gastroesophageal reflux disease)     Hammertoe of right foot     Herpes simplex     Irregular heart beat     LBBB (left bundle branch block)     Motion sickness     Normal delivery     2005 son, 2008 daughter    Pacemaker     AICD    Wears contact lenses     Wears glasses      Past Surgical History:   Procedure Laterality Date    BUNIONECTOMY Bilateral     BUNIONECTOMY Right 09/29/2022    Procedure: LAPIDUS BUNIONECTOMY;  Surgeon: Brandon Montemayor DPM;  Location: [facility];  Service: Podiatry    CARDIAC DEFIBRILLATOR PLACEMENT      CARDIAC ELECTROPHYSIOLOGY PROCEDURE N/A 03/14/2024    Procedure: Cardiac biv icd generator change;  Surgeon: Ravindra Moore DO;  [facility]: [facility];  Service: Cardiology    COLONOSCOPY      NC EXCISION EXCESSIVE SKIN & SUBQ TISSUE ABDOMEN N/A 05/29/2019    Procedure: CIRCUMFERENTIAL ABDOMINOPLASTY;  Surgeon:  "Froy Gann MD;  Location:  MAIN OR;  Service: Plastics    RECESSION GASTROC ENDOSCOPIC Right 09/29/2022    Procedure: RECESSION GASTROC ENDOSCOPIC;  Surgeon: Brandon Montemayor DPM;  Location: [facility];  Service: Podiatry    TOE OSTEOTOMY Right 09/29/2022    Procedure: 2ND HAMMERTOE REPAIR;  Surgeon: Brandon Montemayor DPM;  Location: [facility];  Service: Podiatry    TUBAL LIGATION  2009     OB/GYN History:   Family History   Problem Relation Age of Onset    Hypertension Mother     Diabetes Father     Hypertension Father     Heart disease Father     Hyperlipidemia Father     No Known Problems Brother     No Known Problems Brother     No Known Problems Daughter     No Known Problems Son     No Known Problems Maternal Grandmother     No Known Problems Maternal Grandfather     No Known Problems Paternal Grandmother     No Known Problems Paternal Grandfather     No Known Problems Maternal Aunt     No Known Problems Paternal Aunt     No Known Problems Paternal Aunt     No Known Problems Paternal Aunt     Breast cancer Neg Hx     Colon cancer Neg Hx     Ovarian cancer Neg Hx      Social History   Social History     Substance and Sexual Activity   Alcohol Use Yes    Alcohol/week: 2.0 standard drinks of alcohol    Types: 2 Glasses of wine per week    Comment: social     Social History     Substance and Sexual Activity   Drug Use No     Social History     Tobacco Use   Smoking Status Never   Smokeless Tobacco Never   Tobacco Comments    history of second hand smoke exposure as a child       Meds/Allergies   No medications prior to admission.  No Known Allergies    Objective   Vitals: Height 5' 1\" (1.549 m), weight 87.1 kg (192 lb), last menstrual period 06/27/2024, not currently breastfeeding.    No intake or output data in the 24 hours ending 08/12/24 0828    Invasive Devices:   Invasive Devices       None                   Physical Exam  Vitals reviewed.   Constitutional:       Appearance: Normal " appearance. She is obese.   HENT:      Head: Normocephalic and atraumatic.   Cardiovascular:      Rate and Rhythm: Normal rate.   Pulmonary:      Effort: No respiratory distress.   Abdominal:      General: A surgical scar is present.      Palpations: Abdomen is soft.      Tenderness: There is no abdominal tenderness.   Musculoskeletal:      Right lower leg: No edema.      Left lower leg: No edema.   Skin:     General: Skin is warm and dry.      Capillary Refill: Capillary refill takes less than 2 seconds.   Neurological:      Mental Status: She is alert and oriented to person, place, and time.   Psychiatric:         Mood and Affect: Mood normal.         Behavior: Behavior normal.         Lab Results:   No visits with results within 1 Day(s) from this visit.   Latest known visit with results is:   Lab Requisition on 07/31/2024   Component Date Value    ABO Grouping 07/31/2024 A     Rh Factor 07/31/2024 Positive     Antibody Screen 07/31/2024 Negative     Specimen Expiration Date 07/31/2024 [id number]       Imaging: I have personally reviewed pertinent films in PACS  EKG, Pathology, and Other Studies: I have personally reviewed pertinent reports.      Assessment & Plan     Assessment:  43yo with heavy painful periods desiring definitive surgical management    Plan:  EUA, Total Laparoscopic Hysterectomy, Bilateral Salpingectomy, Cystoscopy  2g Ancef pre-op ppx  ERAS protocol.     Code Status: Prior  Advance Directive and Living Will:      Power of :    POLST:

## 2024-08-12 NOTE — OP NOTE
OPERATIVE REPORT  PATIENT NAME: Tara Marquez    :  1980  MRN: [id number]  Pt Location: [facility] OR ROOM 04    SURGERY DATE: 2024    Surgeons and Role:     * Erika Rodas MD - Primary     * Teresita Vasquez MD - Assisting    Preop Diagnosis:  Dysmenorrhea N94.6  Pelvic pain in female R10.2  Menorrhagia with regular cycle N92.0    Post-Op Diagnosis Codes:     * Dysmenorrhea [N94.6]     * Pelvic pain in female [R10.2]     * Menorrhagia with regular cycle [N92.0]    Procedure(s):  HYSTERECTOMY LAPAROSCOPIC TOTAL (LTH) WITH BILATERAL SALPINGECTOMY  CYSTOSCOPY    Specimen(s):  ID Type Source Tests Collected by Time Destination   1 : uterus, b/l fallopian tubes, cervix Tissue Uterus TISSUE EXAM Erika Rodas MD 2024 1227        Estimated Blood Loss:   Minimal    Drains:  Urethral Catheter Double-lumen;Non-latex;Straight-tip 16 Fr. (Active)   Number of days: 0       Anesthesia Type:   General    Operative Indications:  Dysmenorrhea N94.6  Pelvic pain in female R10.2  Menorrhagia with regular cycle N92.0    Operative Findings:  Normal external female genitalia  Bulky mobile uterus  Normal ovaries, prior tubal ligation  No intra-abdominal adhesions    Complications:   None    Procedure and Technique:    The patient was taken to the operating room where she was properly identified and general anesthesia was obtained without difficulty. The patient was given prophylactic intravenous antibiotics.  A Esperanza hugger was placed to maintain control of core body temperature.      The patient was prepped and draped in the usual sterile manner in the dorsal lithotomy position using the stirrups. Care was taken to avoid excessive flexion or extension of the patient's hips and knees and pressure on her extremities. A time out was performed and the above information confirmed.    A lyons catheter was inserted into the bladder and a weighted speculum was placed into the vagina.  The JAIMES retractor was  placed into the vagina, and the anterior portion of the cervix was grasped with a single tooth tenaculum.  The uterus sounded to 9cm. A stay sutures was placed on the cervix on the anterior aspect, and the Xuan manipulator was placed.  The single tooth tenaculum and the weighted speculum were then removed from the vagina.  The Xuan tip and vaginal cuff occluder were inflated.  A Colón catheter was also aseptically inserted at this time.    Surgeons gloves were then changed, and attention was then turned to the abdomen, Where local was injected infraumbilically.    A small incision was then made infraumbilically, and a 5mm trocar and sleeve were inserted through the incision into the peritoneum under direct visualization without difficulty. Pneumoperitoneum was then obtained using carbon dioxide.  No injuries were noted upon entry.    Subsequently, after infiltrating the areas with local, two additional small incisions were made in both the right and left lower quadrants, approximately 3 cm above and 3 cm medial to the anterior superior iliac spine. Two additional ports (5mm, 5mm) were introduced under direct visualization.      The patient was placed in trendelenburg, and attention was then turned to the uterine fundus, which was grasped at the level of the fallopian tube.  The left fallopian tube was excised using the Enseal device.  This was removed through a trocar site.  The enseal dissecting instrument was then used to coagulate and cut the round ligament with subsequent coagulation and cutting of the left utero-ovarian ligament.  The broad ligament was then coagulated and cut to the level of the uterine arteries.  The enseal was then used to coagulate the uterine artery along the left.  A bladder flap was then created and dissected approximately half of the way across the uterus at the level of the lower uterine segment.  The same procedure was repeated on the right.   The bladder was pushed down.        At  this point, the uterus was visualized and noted to be blanched.  The colpotomy was performed with the monopolar spatula.  Once the colpotomy was completed, the attention was then turned back to the perineum where the vaginal cuff occluder was deflated, and the uterus, cervix were removed vaginally.    A weighted speculum was placed in the vagina.  A Riley retractor was placed anteriorly and the vaginal cuff was grasped using long Allis clamps.  The angles of the vaginal cuff were closed using figure-of-eight sutures.  The middle section was then also closed using figure-of-eight sutures of 0 Vicryl.    We then turned attention back to the abdomen and sterile gloves were exchanged.  The vaginal cuff was inspected and 1 small area was noted on the left side of the incision to have a slight opening.  So that we then proceeded vaginally and placed 1 additional suture under direct visualization with good closure of the cuff.    We then irrigated the vaginal cuff and hemostasis was obtained with a small amount of electrocautery.  Excellent hemostasis was then noted.    Pneumoperitoneum was then evacuated and the trocars were removed. The trochar incisions were closed using 4.0 monocryl and exofin was placed.  The lyons was removed and cystoscopy was performed.  Bilateral ureteral jets were visualized, as was the bladder dome.  No damage to the bladder was visualized on cystoscopy. The bladder was drained and the cystoscope was removed. All instruments were then removed from the vagina.    The patient tolerated the procedure well and was transferred to the recovery room in stable condition.  All needle, sponge, and instrument counts were noted to be correct x 2 at the end of the procedure.       I was present for the entire procedure., A qualified resident physician was not available., and A co-surgeon was required because of skills and techniques relevant to speciality.    Patient Disposition:  PACU         SIGNATURE:  Erika Medina MD  DATE: August 12, 2024  TIME: 1:40 PM

## 2024-08-12 NOTE — DISCHARGE INSTR - AVS FIRST PAGE
"Post-Gynecologic Surgery Discharge Instructions:  1. No heavy lifting more than one full gallon of milk (about 8 lbs) for 2 weeks, we will discuss further restrictions at 2 week post-op.  2. Nothing in the vagina for 6 weeks, or until cleared at your post-operative visit  3. You may take stairs one at a time, touching each step with both feet for the first few days, then as tolerated.  4. Call the office for fever greater than 100.4'F, heavy vaginal bleeding, or increasing pain.  5. Activity as tolerated.  6. Avoid driving if taking narcotic pain medications (Roxicodone) and for the first week post-op.    Post Operative Pain Management:  For pain, take 650 mg of tylenol every 6 hours.   For cramping, take 600 mg Ibuprofen every 6 hours to relieve.     You may alternate these and take them \"around the clock\" to stay ahead of pain. For example, take 650mg of tylenol at 9 AM, then 600mg of ibuprofen at 12 PM, then 650 of tylenol at 3 PM, and so forth.     Post Operative Bowel Management:  Please take over the counter stool softener or laxative to ensure you do not strain, as the bowels are often the last thing to wake up from anesthesia. You can take whatever is preferred (colace, senna, or miralax)    If you have any questions regarding your prescriptions please call your doctor.  "

## 2024-08-15 PROCEDURE — 88307 TISSUE EXAM BY PATHOLOGIST: CPT | Performed by: PATHOLOGY

## 2024-08-16 ENCOUNTER — TELEPHONE (OUTPATIENT)
Age: 44
End: 2024-08-16

## 2024-08-27 ENCOUNTER — OFFICE VISIT (OUTPATIENT)
Age: 44
End: 2024-08-27

## 2024-08-27 VITALS
DIASTOLIC BLOOD PRESSURE: 72 MMHG | WEIGHT: 199.6 LBS | SYSTOLIC BLOOD PRESSURE: 112 MMHG | BODY MASS INDEX: 37.69 KG/M2 | HEIGHT: 61 IN

## 2024-08-27 DIAGNOSIS — Z90.710 S/P LAPAROSCOPIC HYSTERECTOMY: Primary | ICD-10-CM

## 2024-08-27 PROCEDURE — 99024 POSTOP FOLLOW-UP VISIT: CPT | Performed by: OBSTETRICS & GYNECOLOGY

## 2024-09-04 DIAGNOSIS — I42.0 DILATED CARDIOMYOPATHY (HCC): ICD-10-CM

## 2024-09-04 RX ORDER — LISINOPRIL 5 MG/1
TABLET ORAL
Qty: 60 TABLET | Refills: 5 | Status: SHIPPED | OUTPATIENT
Start: 2024-09-04

## 2024-09-20 ENCOUNTER — REMOTE DEVICE CLINIC VISIT (OUTPATIENT)
Dept: CARDIOLOGY CLINIC | Facility: CLINIC | Age: 44
End: 2024-09-20
Payer: COMMERCIAL

## 2024-09-20 DIAGNOSIS — Z95.810 PRESENCE OF AUTOMATIC CARDIOVERTER/DEFIBRILLATOR (AICD): Primary | ICD-10-CM

## 2024-09-20 PROCEDURE — 93295 DEV INTERROG REMOTE 1/2/MLT: CPT | Performed by: INTERNAL MEDICINE

## 2024-09-20 PROCEDURE — 93296 REM INTERROG EVL PM/IDS: CPT | Performed by: INTERNAL MEDICINE

## 2024-09-20 NOTE — PROGRESS NOTES
Results for orders placed or performed in visit on 09/20/24   Cardiac EP device report    Narrative    MDT/CRT-D (DDD MODE)/ NOT MRI CONDITIONAL  CARELINK TRANSMISSION: BATTERY VOLTAGE ADEQUATE. (10.4 YRS) AP <1%  98%. ALL AVAILABLE LEAD PARAMETERS WITHIN NORMAL LIMITS. NO SIGNIFICANT HIGH RATE EPISODES. 241 SVT EPISODES DETECTED MAX RATE 146 BPM. OPTI-VOL WITHIN NORMAL LIMITS. NORMAL DEVICE FUNCTION.---HEIN

## 2024-09-25 ENCOUNTER — OFFICE VISIT (OUTPATIENT)
Age: 44
End: 2024-09-25

## 2024-09-25 VITALS
SYSTOLIC BLOOD PRESSURE: 112 MMHG | WEIGHT: 200.4 LBS | BODY MASS INDEX: 37.84 KG/M2 | DIASTOLIC BLOOD PRESSURE: 72 MMHG | HEIGHT: 61 IN

## 2024-09-25 DIAGNOSIS — Z90.710 S/P LAPAROSCOPIC HYSTERECTOMY: Primary | ICD-10-CM

## 2024-09-25 PROCEDURE — 99024 POSTOP FOLLOW-UP VISIT: CPT | Performed by: OBSTETRICS & GYNECOLOGY

## 2024-09-25 NOTE — PROGRESS NOTES
Post - Operative Visit    Tara Marquez is a 44 y.o. female here for post-operative visit.  She is s/p TLH, BS,cysto on 8/12/2024.  Her surgery was uncomplicated.  Her post-operative course has been uneventful.  She denies fevers.  She has no bowel or bladder concerns.  She has no vaginal discharge or concerning bleeding.      Fatigue is gone.   Has had no more pelvic pain thus far, is optimistic.   Some night sweats/perimenopause symptoms.     Past Medical History:   Diagnosis Date    Allergic rhinitis     Anxiety     Atrial fibrillation (HCC)     paroxysmal    Bunion 1985    Candidal vulvovaginitis     Cardiomyopathy (HCC)     Chest pain     CHF (congestive heart failure) (HCC)     chronic systolic    Coronary artery disease     Fibroid     GERD (gastroesophageal reflux disease)     Hammertoe of right foot     Herpes simplex     Irregular heart beat     LBBB (left bundle branch block)     Motion sickness     Normal delivery     2005 son, 2008 daughter    Pacemaker     AICD    Wears contact lenses     Wears glasses      Past Surgical History:   Procedure Laterality Date    BUNIONECTOMY Bilateral     BUNIONECTOMY Right 09/29/2022    Procedure: LAPIDUS BUNIONECTOMY;  Surgeon: Brandon Montemayor DPM;  Location: [facility];  Service: Podiatry    CARDIAC DEFIBRILLATOR PLACEMENT      CARDIAC ELECTROPHYSIOLOGY PROCEDURE N/A 03/14/2024    Procedure: Cardiac biv icd generator change;  Surgeon: Ravindra Moore DO;  [facility];  Service: Cardiology    COLONOSCOPY      NM CYSTOURETHROSCOPY N/A 8/12/2024    Procedure: CYSTOSCOPY;  Surgeon: Erika Rodas MD;  Location:  [facility];  Service: Gynecology    NM EXCISION EXCESSIVE SKIN & SUBQ TISSUE ABDOMEN N/A 05/29/2019    Procedure: CIRCUMFERENTIAL ABDOMINOPLASTY;  Surgeon: Froy Gann MD;  Location:  [facility];  Service: Plastics    NM LAPS TOTAL HYSTERECT 250 GM/< W/RMVL TUBE/OVARY N/A 8/12/2024    Procedure: HYSTERECTOMY LAPAROSCOPIC TOTAL  "([facility]) WITH BILATERAL SALPINGECTOMY;  Surgeon: Erika Rodas MD;  Location: [facility];  Service: Gynecology    RECESSION GASTROC ENDOSCOPIC Right 09/29/2022    Procedure: RECESSION GASTROC ENDOSCOPIC;  Surgeon: Brandon Montemayor DPM;  [facility];  Service: Podiatry    TOE OSTEOTOMY Right 09/29/2022    Procedure: 2ND HAMMERTOE REPAIR;  Surgeon: Brandon Montemayor DPM;  [facility];  Service: Podiatry    TUBAL LIGATION  2009       Objective:  /72 (BP Location: Right arm, Patient Position: Sitting, Cuff Size: Standard)   Ht 5' 1\" (1.549 m)   Wt 90.9 kg (200 lb 6.4 oz)   LMP  (LMP Unknown)   BMI 37.87 kg/m²   Physical Exam  Constitutional:       General: She is not in acute distress.     Appearance: Normal appearance. She is obese. She is not ill-appearing.   Genitourinary:      Vulva normal.      Right Labia: No rash.     Left Labia: No rash.     Vaginal cuff intact.     Perineal sutures intact.     No vaginal discharge, erythema, tenderness, bleeding, granulation tissue or cuff induration.        Right Adnexa: no mass present.     Left Adnexa: no mass present.  Neurological:      Mental Status: She is alert.   Vitals reviewed.         A:  44 y.o. s/p TLH, BS, cysto  - doing well post-operatively    P:  Restrictions lifted.  RTO for annual exam.   "

## 2024-09-28 DIAGNOSIS — I42.0 DILATED CARDIOMYOPATHY (HCC): ICD-10-CM

## 2024-09-28 RX ORDER — LISINOPRIL 5 MG/1
TABLET ORAL
Qty: 180 TABLET | Refills: 0 | Status: SHIPPED | OUTPATIENT
Start: 2024-09-28

## 2024-11-02 DIAGNOSIS — I42.0 DILATED CARDIOMYOPATHY (HCC): ICD-10-CM

## 2024-11-04 RX ORDER — CARVEDILOL 25 MG/1
TABLET ORAL
Qty: 180 TABLET | Refills: 0 | Status: SHIPPED | OUTPATIENT
Start: 2024-11-04

## 2024-11-08 ENCOUNTER — OFFICE VISIT (OUTPATIENT)
Dept: CARDIOLOGY CLINIC | Facility: CLINIC | Age: 44
End: 2024-11-08
Payer: COMMERCIAL

## 2024-11-08 VITALS
DIASTOLIC BLOOD PRESSURE: 80 MMHG | OXYGEN SATURATION: 97 % | SYSTOLIC BLOOD PRESSURE: 120 MMHG | WEIGHT: 198.9 LBS | HEART RATE: 87 BPM | BODY MASS INDEX: 37.58 KG/M2

## 2024-11-08 DIAGNOSIS — I42.0 DILATED CARDIOMYOPATHY (HCC): ICD-10-CM

## 2024-11-08 DIAGNOSIS — I34.0 NONRHEUMATIC MITRAL VALVE REGURGITATION: ICD-10-CM

## 2024-11-08 DIAGNOSIS — I44.7 LEFT BUNDLE-BRANCH BLOCK: Primary | ICD-10-CM

## 2024-11-08 DIAGNOSIS — Z90.710 S/P LAPAROSCOPIC HYSTERECTOMY: ICD-10-CM

## 2024-11-08 PROBLEM — Z01.818 PRE-OP EXAMINATION: Status: RESOLVED | Noted: 2022-09-09 | Resolved: 2024-11-08

## 2024-11-08 PROBLEM — Z01.810 PREOP CARDIOVASCULAR EXAM: Status: RESOLVED | Noted: 2024-03-07 | Resolved: 2024-11-08

## 2024-11-08 PROCEDURE — 99213 OFFICE O/P EST LOW 20 MIN: CPT | Performed by: INTERNAL MEDICINE

## 2024-11-08 NOTE — PROGRESS NOTES
Cardiology Follow Up    Tara Marquez  1980  [id number]  [facility] [place]  [address]  [place]  [facility] [address] [phone]    1. Left bundle-branch block        2. Nonrheumatic mitral valve regurgitation        3. Dilated cardiomyopathy (HCC)        4. S/P laparoscopic hysterectomy              Discussion/Summary: All of her assessed cardiac problems are stable. I have reviewed her medications and made no changes. No cardiac testing is ordered.  RTO 1 year.      Interval History: She has not had any cardiac problems since her last OV. She had her BiV ICD battery replaced without a problem. She feels better since her lap hysterectomy.  ECHO 3/2024 - EF 55%    Her weight is up from 181 to 198 lbs.    Optivol is normal.    She denies CP, SOB, palpitations, LE edema.    /80    Patient Active Problem List   Diagnosis    Anxiety    Cardiomyopathy (HCC)    Left bundle-branch block    Menorrhagia with regular cycle    Mitral regurgitation    Chest pain on breathing    Annual physical exam    Lipodystrophy    Class 1 obesity due to excess calories with serious comorbidity and body mass index (BMI) of 34.0 to 34.9 in adult    Paroxysmal atrial fibrillation (HCC)    Night sweats    Generalized abdominal pain    Pacemaker    History of motion sickness    Gastritis    Adenomyosis of the uterus    Pelvic pain    IFG (impaired fasting glucose)    Moderate mixed hyperlipidemia not requiring statin therapy    S/P laparoscopic hysterectomy     Past Medical History:   Diagnosis Date    Allergic rhinitis     Anxiety     Atrial fibrillation (HCC)     paroxysmal    Bunion 1985    Candidal vulvovaginitis     Cardiomyopathy (HCC)     Chest pain     CHF (congestive heart failure) (HCC)     chronic systolic    Coronary artery disease     Fibroid     GERD (gastroesophageal reflux disease)     Hammertoe of right foot     Herpes simplex      Irregular heart beat     LBBB (left bundle branch block)     Motion sickness     Normal delivery     2005 son, 2008 daughter    Pacemaker     AICD    Wears contact lenses     Wears glasses      Social History     Socioeconomic History    Marital status: /Civil Union     Spouse name: Not on file    Number of children: Not on file    Years of education: Not on file    Highest education level: Not on file   Occupational History    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   Tobacco Use    Smoking status: Never    Smokeless tobacco: Never    Tobacco comments:     history of second hand smoke exposure as a child   Vaping Use    Vaping status: Never Used   Substance and Sexual Activity    Alcohol use: Not Currently     Alcohol/week: 2.0 standard drinks of alcohol     Types: 2 Glasses of wine per week     Comment: social    Drug use: No    Sexual activity: Not Currently     Partners: Male     Birth control/protection: Female Sterilization   Other Topics Concern    Not on file   Social History Narrative    Lives with her  and 2 children    Works full time     Exercises 4-5 times per week    Reports a healthy/balanced diet     Social Determinants of Health     Financial Resource Strain: Not on file   Food Insecurity: Not on file   Transportation Needs: Not on file   Physical Activity: Not on file   Stress: Not on file   Social Connections: Not on file   Intimate Partner Violence: Not on file   Housing Stability: Not on file      Family History   Problem Relation Age of Onset    Hypertension Mother     Diabetes Father     Hypertension Father     Heart disease Father     Hyperlipidemia Father     No Known Problems Brother     No Known Problems Brother     No Known Problems Daughter     No Known Problems Son     No Known Problems Maternal Grandmother     No Known Problems Maternal Grandfather     No Known Problems Paternal Grandmother     No Known Problems Paternal Grandfather     No Known Problems Maternal Aunt     No  Known Problems Paternal Aunt     No Known Problems Paternal Aunt     No Known Problems Paternal Aunt     Breast cancer Neg Hx     Colon cancer Neg Hx     Ovarian cancer Neg Hx      Past Surgical History:   Procedure Laterality Date    BUNIONECTOMY Bilateral     BUNIONECTOMY Right 09/29/2022    Procedure: LAPIDUS BUNIONECTOMY;  Surgeon: Brandon Montemayor DPM;  [facility];  Service: Podiatry    CARDIAC DEFIBRILLATOR PLACEMENT      CARDIAC ELECTROPHYSIOLOGY PROCEDURE N/A 03/14/2024    Procedure: Cardiac biv icd generator change;  Surgeon: Ravindra Moore DO;  [facility];  Service: Cardiology    COLONOSCOPY      AL CYSTOURETHROSCOPY N/A 8/12/2024    Procedure: CYSTOSCOPY;  Surgeon: Erika Rodas MD;  Location: [facility];  Service: Gynecology    AL EXCISION EXCESSIVE SKIN & SUBQ TISSUE ABDOMEN N/A 05/29/2019    Procedure: CIRCUMFERENTIAL ABDOMINOPLASTY;  Surgeon: Froy Gann MD;  Location: [facility];  Service: Plastics    AL LAPS TOTAL HYSTERECT 250 GM/< W/RMVL TUBE/OVARY N/A 8/12/2024    Procedure: HYSTERECTOMY LAPAROSCOPIC TOTAL (LTH) WITH BILATERAL SALPINGECTOMY;  Surgeon: Erika Rodas MD;  [facility];  Service: Gynecology    RECESSION GASTROC ENDOSCOPIC Right 09/29/2022    Procedure: RECESSION GASTROC ENDOSCOPIC;  Surgeon: Brandon Montemayor DPM;  [facility];  Service: Podiatry    TOE OSTEOTOMY Right 09/29/2022    Procedure: 2ND HAMMERTOE REPAIR;  Surgeon: Brandon Montemayor DPM;  [facility];  Service: Podiatry    TUBAL LIGATION  2009       Current Outpatient Medications:     ALPRAZolam (XANAX) 0.5 mg tablet, Take 0.5 tablets (0.25 mg total) by mouth 3 (three) times a day as needed for anxiety, Disp: 20 tablet, Rfl: 0    carvedilol (COREG) 25 mg tablet, TAKE 1 TABLET BY MOUTH TWICE A DAY, Disp: 180 tablet, Rfl: 0    lisinopril (ZESTRIL) 5 mg tablet, TAKE 1 TABLET BY MOUTH TWICE A DAY, Disp: 180 tablet, Rfl: 0  No Known  Allergies  Vitals:    11/08/24 0750   BP: 120/80   BP Location: Right arm   Patient Position: Sitting   Cuff Size: Large   Pulse: 87   SpO2: 97%   Weight: 90.2 kg (198 lb 14.4 oz)     Weight (last 2 days)       Date/Time Weight    11/08/24 0750 90.2 (198.9)           Blood pressure 120/80, pulse 87, weight 90.2 kg (198 lb 14.4 oz), SpO2 97%, not currently breastfeeding., Body mass index is 37.58 kg/m².    Labs:  Admission on 08/12/2024, Discharged on 08/12/2024   Component Date Value    EXT Preg Test, Ur 08/12/2024 Negative     Control 08/12/2024 Valid     ABO Grouping 08/12/2024 A     Rh Factor 08/12/2024 Positive     Case Report 08/12/2024                      Value:Surgical Pathology Report                         Case: [id number]                                  Authorizing Provider:  Erika Rodas MD    Collected:           08/12/2024 1227              Ordering Location:     [facility]     Received:            08/12/2024 1428                                     [facility]                                                        Pathologist:           Jenise Alvarenga MD                                                                  Specimen:    Uterus, uterus, b/l fallopian tubes, cervix                                                Final Diagnosis 08/12/2024                      Value:A. Uterus and bilateral fallopian tubes, Hysterectomy and bilateral salpingectomy:    Uterus, 198 Grams    Endometrium: Weakly proliferative endometrium.    Myometrium: Leiomyoma; Adenomyosis.    Cervix: Nabothian cyst; Uterine cervix with focal keratinization, consistent with prolapse-associated changes.    Serosa: Unremarkable.     Fallopian tube: Unremarkable.       Additional Information 08/12/2024                      Value:All reported additional testing was performed with appropriately reactive controls.  These tests were developed and their performance characteristics determined by [facility]  "Specialty Laboratory or appropriate performing facility, though some tests may be performed on tissues which have not been validated for performance characteristics (such as staining performed on alcohol exposed cell blocks and decalcified tissues).  Results should be interpreted with caution and in the context of the patients’ clinical condition. These tests may not be cleared or approved by the U.S. Food and Drug Administration, though the FDA has determined that such clearance or approval is not necessary. These tests are used for clinical purposes and they should not be regarded as investigational or for research. This laboratory has been approved by CLIA 88, designated as a high-complexity laboratory and is qualified to perform these tests.    Interpretation performed at [facility], [address] [address]      Gross Description 08/12/2024                      Value:A. The specimen is received in formalin, labeled with the patient's name and hospital number, and is designated \"uterus, bilateral fallopian tubes, cervix\".  The specimen consists of a uterus with cervix measuring 9.2 x 7.6 x 5.2 cm which weighs 198 g.  The serosa appears unremarkable.  A slit shaped 1 cm os is present.  The uterus is bivalved and sectioned revealing a cervix exhibiting scattered mucus filled cysts.  The endometrial cavity is roughly triangular and is lined by pink to red tissue/material averaging 0.1 to 0.2 cm in thickness.  No discrete endometrial lesions are seen.  The underlying myometrium exhibits areas of extensive trabeculation as well as multiple (4-5) subserosal and intramural nodules measuring up to 2.3 cm each.  The nodules exhibit whorled tan cut surfaces showing no gross evidence of necrosis, hemorrhage or calcification.      Separately submitted in the specimen container are 2 tan to purple distally fimbriated fallopian tube segments, each measuring 0.5                           " cm in average diameter, with lengths of 1.7 and 3.7 cm.  Upon cut section the tubes appear unremarkable.    Representative sections.  12 cassettes.  1: Anterior cervix  2: Posterior cervix  3-4: Anterior endomyometrium, 1 section in each cassette  5-6: Posterior endomyometrium, 1 section in each cassette  7-8: Myometrial nodules  9-10: [place] fallopian tube, entire  11-12: Longer fallopian tube, representative sections to include entire fimbria    Note: The estimated total formalin fixation time based upon information provided by the submitting clinician and the standard processing schedule is under 72 hours. [facility]      Clinical Information 08/12/2024                      Value:Normal external female genitalia  Bulky mobile uterus  Normal ovaries, prior tubal ligation  No intra-abdominal adhesions   Lab Requisition on 07/31/2024   Component Date Value    ABO Grouping 07/31/2024 A     Rh Factor 07/31/2024 Positive     Antibody Screen 07/31/2024 Negative     Specimen Expiration Date 07/31/2024 20240828    Appointment on 07/31/2024   Component Date Value    Sodium 07/31/2024 138     Potassium 07/31/2024 4.4     Chloride 07/31/2024 105     CO2 07/31/2024 23     ANION GAP 07/31/2024 10     BUN 07/31/2024 12     Creatinine 07/31/2024 0.53 (L)     Glucose, Fasting 07/31/2024 123 (H)     Calcium 07/31/2024 8.6     eGFR 07/31/2024 114     WBC 07/31/2024 5.96     RBC 07/31/2024 4.15     Hemoglobin 07/31/2024 10.7 (L)     Hematocrit 07/31/2024 34.6 (L)     MCV 07/31/2024 83     MCH 07/31/2024 25.8 (L)     MCHC 07/31/2024 30.9 (L)     RDW 07/31/2024 15.1     MPV 07/31/2024 11.5     Platelets 07/31/2024 240     nRBC 07/31/2024 0     Segmented % 07/31/2024 58     Immature Grans % 07/31/2024 0     Lymphocytes % 07/31/2024 27     Monocytes % 07/31/2024 11     Eosinophils Relative 07/31/2024 3     Basophils Relative 07/31/2024 1     Absolute Neutrophils 07/31/2024 3.47     Absolute Immature Grans 07/31/2024 0.02     Absolute  Lymphocytes 07/31/2024 1.63     Absolute Monocytes 07/31/2024 0.64     Eosinophils Absolute 07/31/2024 0.16     Basophils Absolute 07/31/2024 0.04      Imaging: No results found.    Review of Systems:  Review of Systems   Constitutional:  Negative for diaphoresis, fatigue, fever and unexpected weight change.   HENT: Negative.     Respiratory:  Negative for cough, shortness of breath and wheezing.    Cardiovascular:  Negative for chest pain, palpitations and leg swelling.   Gastrointestinal:  Negative for abdominal pain, diarrhea and nausea.   Musculoskeletal:  Negative for gait problem and myalgias.   Skin:  Negative for rash.   Neurological:  Negative for dizziness and numbness.   Psychiatric/Behavioral: Negative.         Physical Exam:  Physical Exam  Constitutional:       Appearance: She is well-developed.   HENT:      Head: Normocephalic and atraumatic.   Eyes:      Pupils: Pupils are equal, round, and reactive to light.   Neck:      Vascular: No JVD.   Cardiovascular:      Rate and Rhythm: Regular rhythm.      Pulses: Normal pulses.           Carotid pulses are 2+ on the right side and 2+ on the left side.     Heart sounds: S1 normal and S2 normal.   Pulmonary:      Effort: Pulmonary effort is normal.      Breath sounds: Normal breath sounds. No wheezing or rales.   Abdominal:      General: Bowel sounds are normal.      Palpations: Abdomen is soft.   Musculoskeletal:         General: No tenderness. Normal range of motion.      Cervical back: Normal range of motion and neck supple.   Skin:     General: Skin is warm.   Neurological:      Mental Status: She is alert and oriented to person, place, and time.      Cranial Nerves: No cranial nerve deficit.      Deep Tendon Reflexes: Reflexes are normal and symmetric.

## 2024-11-15 ENCOUNTER — TRANSCRIBE ORDERS (OUTPATIENT)
Dept: GASTROENTEROLOGY | Facility: CLINIC | Age: 44
End: 2024-11-15

## 2024-11-19 ENCOUNTER — TELEPHONE (OUTPATIENT)
Dept: GASTROENTEROLOGY | Facility: CLINIC | Age: 44
End: 2024-11-19

## 2024-12-06 ENCOUNTER — OFFICE VISIT (OUTPATIENT)
Dept: GASTROENTEROLOGY | Facility: CLINIC | Age: 44
End: 2024-12-06
Payer: COMMERCIAL

## 2024-12-06 VITALS
HEIGHT: 61 IN | SYSTOLIC BLOOD PRESSURE: 118 MMHG | WEIGHT: 192 LBS | BODY MASS INDEX: 36.25 KG/M2 | TEMPERATURE: 98.9 F | DIASTOLIC BLOOD PRESSURE: 77 MMHG

## 2024-12-06 DIAGNOSIS — R10.13 DYSPEPSIA: Primary | ICD-10-CM

## 2024-12-06 DIAGNOSIS — R11.2 NAUSEA AND VOMITING, UNSPECIFIED VOMITING TYPE: ICD-10-CM

## 2024-12-06 DIAGNOSIS — R09.A2 GLOBUS SENSATION: ICD-10-CM

## 2024-12-06 PROCEDURE — 99244 OFF/OP CNSLTJ NEW/EST MOD 40: CPT | Performed by: PHYSICIAN ASSISTANT

## 2024-12-06 RX ORDER — OMEPRAZOLE 40 MG/1
40 CAPSULE, DELAYED RELEASE ORAL DAILY
Qty: 30 CAPSULE | Refills: 3 | Status: SHIPPED | OUTPATIENT
Start: 2024-12-06

## 2024-12-06 NOTE — PROGRESS NOTES
"Name: Tara Marquez      : 1980      MRN: [id number]  Encounter Provider: Tammy Adams PA-C  Encounter Date: 2024   Encounter department: [facility] [place]  :    Tara is a 43 y/o female with PAF not on AC, HTN, mitral regurgitation, anxiety who presents for consultation of GERD.   Assessment & Plan  Dyspepsia  Patient says that about 2 or 3 months ago, she started feeling heartburn, burping, and bloating that has been occurring on a daily basis.  She is taking over-the-counter omeprazole 20 mg daily and she notes that this only mildly alleviates the symptoms however this continues to be a daily occurrence.  She says she also constantly feels that she has something in her throat.  She denies trouble swallowing or eating.  She was seeing Dr. Zhang in the past for abdominal pain that has since resolved and was thought to be due to ovarian cysts.  She says that this\" burning\" sensation that she feels in her chest and upper abdomen is different than what she was feeling when she was seeing Dr. Zhang.  Patient says she would like to hold off on EGD at this time if possible  -Start omeprazole 40 mg daily; please stop the over-the-counter omeprazole  -Barium esophagram ordered but I did explain to the patient that if there is concerning findings or if this is nondiagnostic and she continues with symptoms, then I would again recommend EGD  -Anti-GERD diet discussed and a handout was printed out for the patient  Orders:    FL barium swallow; Future    omeprazole (PriLOSEC) 40 MG capsule; Take 1 capsule (40 mg total) by mouth daily 30 minutes before breakfast    Globus sensation  See above  -Barium swallow ordered       Nausea and vomiting, unspecified vomiting type  She denies frequent nausea and vomiting but says that when the reflux is severe, she can vomit from this.  She says the last time this occurred was 2 weeks ago.  -See above           History of Present Illness " "  HPI  Tara Marquez is a 44 y.o. female who presents for consultation of GERD. patient says that about 2 or 3 months ago, she started feeling heartburn, burping, and bloating that has been occurring on a daily basis.  She is taking over-the-counter omeprazole 20 mg daily and she notes that this only mildly alleviates the symptoms however this continues to be a daily occurrence.  She says she also constantly feels that she has something in her throat.  She denies trouble swallowing or eating.  She was seeing Dr. Zhang in the past for abdominal pain that has since resolved and was thought to be due to ovarian cysts.  She says that this\" burning\" sensation that she feels in her chest and upper abdomen is different than what she was feeling when she was seeing Dr. Zhang.  She denies frequent nausea and vomiting but says that when the reflux is severe, she can vomit from this.  She says the last time this occurred was 2 weeks ago.  She denies family history of colon cancer, unintentional weight loss, fevers, chills, changes in bowel habits, NSAID use, bloody or black stools.  She underwent a hysterectomy due to the cysts that were found almost 2 years ago but denies any other abdominal surgical history.  Patient denies being on blood thinners.  Patient denies tobacco and alcohol use.  History obtained from: patient    Review of Systems   Constitutional:  Negative for chills and fever.   HENT:  Negative for ear pain and sore throat.    Eyes:  Negative for pain and visual disturbance.   Respiratory:  Negative for cough and shortness of breath.    Cardiovascular:  Negative for chest pain and palpitations.   Gastrointestinal:  Positive for abdominal distention, nausea and vomiting. Negative for abdominal pain, anal bleeding, blood in stool, constipation, diarrhea and rectal pain.   Genitourinary:  Negative for dysuria and hematuria.   Musculoskeletal:  Negative for arthralgias and back pain.   Skin:  Negative for color " change and rash.   Neurological:  Negative for seizures and syncope.   All other systems reviewed and are negative.    Medical History Reviewed by provider this encounter:     .  Past Medical History   Past Medical History:   Diagnosis Date    Allergic rhinitis     Anxiety     Atrial fibrillation (HCC)     paroxysmal    Bunion 1985    Candidal vulvovaginitis     Cardiomyopathy (HCC)     Chest pain     CHF (congestive heart failure) (HCC)     chronic systolic    Coronary artery disease     Fibroid     GERD (gastroesophageal reflux disease)     Hammertoe of right foot     Herpes simplex     Irregular heart beat     LBBB (left bundle branch block)     Motion sickness     Normal delivery     2005 son, 2008 daughter    Pacemaker     AICD    Wears contact lenses     Wears glasses      Past Surgical History:   Procedure Laterality Date    BUNIONECTOMY Bilateral     BUNIONECTOMY Right 09/29/2022    Procedure: LAPIDUS BUNIONECTOMY;  Surgeon: Brandon Montemayor DPM;  [facility];  Service: Podiatry    CARDIAC DEFIBRILLATOR PLACEMENT      CARDIAC ELECTROPHYSIOLOGY PROCEDURE N/A 03/14/2024    Procedure: Cardiac biv icd generator change;  Surgeon: Ravindra Moore DO;  [facility];  Service: Cardiology    COLONOSCOPY      CA CYSTOURETHROSCOPY N/A 8/12/2024    Procedure: CYSTOSCOPY;  Surgeon: Erika Rodas MD;  Location:  MAIN OR;  Service: Gynecology    CA EXCISION EXCESSIVE SKIN & SUBQ TISSUE ABDOMEN N/A 05/29/2019    Procedure: CIRCUMFERENTIAL ABDOMINOPLASTY;  Surgeon: Froy Gann MD;  Location:  MAIN OR;  Service: Plastics    CA LAPS TOTAL HYSTERECT 250 GM/< W/RMVL TUBE/OVARY N/A 8/12/2024    Procedure: HYSTERECTOMY LAPAROSCOPIC TOTAL (LTH) WITH BILATERAL SALPINGECTOMY;  Surgeon: Erika Rodas MD;  Location:  MAIN OR;  Service: Gynecology    RECESSION GASTROC ENDOSCOPIC Right 09/29/2022    Procedure: RECESSION GASTROC ENDOSCOPIC;  Surgeon: Brandon Montemayor DPM;   [facility];  Service: Podiatry    TOE OSTEOTOMY Right 09/29/2022    Procedure: 2ND HAMMERTOE REPAIR;  Surgeon: Brandon Montemayor DPM;  [facility];  Service: Podiatry    TUBAL LIGATION  2009     Family History   Problem Relation Age of Onset    Hypertension Mother     Diabetes Father     Hypertension Father     Heart disease Father     Hyperlipidemia Father     No Known Problems Brother     No Known Problems Brother     No Known Problems Daughter     No Known Problems Son     No Known Problems Maternal Grandmother     No Known Problems Maternal Grandfather     No Known Problems Paternal Grandmother     No Known Problems Paternal Grandfather     No Known Problems Maternal Aunt     No Known Problems Paternal Aunt     No Known Problems Paternal Aunt     No Known Problems Paternal Aunt     Breast cancer Neg Hx     Colon cancer Neg Hx     Ovarian cancer Neg Hx       reports that she has never smoked. She has never used smokeless tobacco. She reports that she does not currently use alcohol after a past usage of about 2.0 standard drinks of alcohol per week. She reports that she does not use drugs.  Current Outpatient Medications on File Prior to Visit   Medication Sig Dispense Refill    ALPRAZolam (XANAX) 0.5 mg tablet Take 0.5 tablets (0.25 mg total) by mouth 3 (three) times a day as needed for anxiety 20 tablet 0    carvedilol (COREG) 25 mg tablet TAKE 1 TABLET BY MOUTH TWICE A  tablet 0    lisinopril (ZESTRIL) 5 mg tablet TAKE 1 TABLET BY MOUTH TWICE A  tablet 0     No current facility-administered medications on file prior to visit.   No Known Allergies   Current Outpatient Medications on File Prior to Visit   Medication Sig Dispense Refill    ALPRAZolam (XANAX) 0.5 mg tablet Take 0.5 tablets (0.25 mg total) by mouth 3 (three) times a day as needed for anxiety 20 tablet 0    carvedilol (COREG) 25 mg tablet TAKE 1 TABLET BY MOUTH TWICE A  tablet 0    lisinopril (ZESTRIL) 5  mg tablet TAKE 1 TABLET BY MOUTH TWICE A  tablet 0     No current facility-administered medications on file prior to visit.      Social History     Tobacco Use    Smoking status: Never    Smokeless tobacco: Never    Tobacco comments:     history of second hand smoke exposure as a child   Vaping Use    Vaping status: Never Used   Substance and Sexual Activity    Alcohol use: Not Currently     Alcohol/week: 2.0 standard drinks of alcohol     Types: 2 Glasses of wine per week     Comment: social    Drug use: No    Sexual activity: Not Currently     Partners: Male     Birth control/protection: Female Sterilization        Objective   LMP  (LMP Unknown)      Physical Exam  Constitutional:       Appearance: Normal appearance.   Cardiovascular:      Rate and Rhythm: Normal rate and regular rhythm.   Pulmonary:      Breath sounds: Normal breath sounds.   Abdominal:      General: Bowel sounds are normal. There is no distension.      Palpations: There is no mass.      Tenderness: There is no abdominal tenderness. There is no guarding or rebound.   Neurological:      Mental Status: She is alert.         Administrative Statements   I have spent a total time of 30 minutes in caring for this patient on the day of the visit/encounter including Prognosis, Risks and benefits of tx options, Instructions for management, Patient and family education, Importance of tx compliance, Risk factor reductions, Impressions, Counseling / Coordination of care, Documenting in the medical record, Reviewing / ordering tests, medicine, procedures  , Obtaining or reviewing history  , and Communicating with other healthcare professionals .

## 2024-12-06 NOTE — PATIENT INSTRUCTIONS
Take the omeprazole right when you wake up, wait 30 minutes, eat then have your caffeine  Make sure you eat before having caffeine throughout the day  Stay upright  for at least two hours after eating    Patient is scheduled for Barium Swallow 12/13/2024 at 9 am   Follow up will be 05/05/2025 at 1:30 pm with Tammy Adams in [place]    NOT DIABETIC AND NOT ON BT

## 2024-12-09 ENCOUNTER — TELEPHONE (OUTPATIENT)
Age: 44
End: 2024-12-09

## 2024-12-09 NOTE — TELEPHONE ENCOUNTER
LMOM for patient to call the office to reschedule upcoming appt with Dr. Rodas because she will not be in the office.

## 2024-12-13 ENCOUNTER — HOSPITAL ENCOUNTER (OUTPATIENT)
Dept: RADIOLOGY | Facility: HOSPITAL | Age: 44
Discharge: HOME | End: 2024-12-13
Payer: COMMERCIAL

## 2024-12-13 ENCOUNTER — RESULTS FOLLOW-UP (OUTPATIENT)
Dept: GASTROENTEROLOGY | Facility: CLINIC | Age: 44
End: 2024-12-13

## 2024-12-13 DIAGNOSIS — R10.13 DYSPEPSIA: ICD-10-CM

## 2024-12-13 PROCEDURE — 74220 X-RAY XM ESOPHAGUS 1CNTRST: CPT

## 2024-12-20 ENCOUNTER — RESULTS FOLLOW-UP (OUTPATIENT)
Dept: CARDIOLOGY CLINIC | Facility: CLINIC | Age: 44
End: 2024-12-20

## 2024-12-20 ENCOUNTER — REMOTE DEVICE CLINIC VISIT (OUTPATIENT)
Dept: CARDIOLOGY CLINIC | Facility: CLINIC | Age: 44
End: 2024-12-20
Payer: COMMERCIAL

## 2024-12-20 DIAGNOSIS — Z95.810 PRESENCE OF AUTOMATIC CARDIOVERTER/DEFIBRILLATOR (AICD): Primary | ICD-10-CM

## 2024-12-20 PROCEDURE — 93295 DEV INTERROG REMOTE 1/2/MLT: CPT | Performed by: INTERNAL MEDICINE

## 2024-12-20 PROCEDURE — 93296 REM INTERROG EVL PM/IDS: CPT | Performed by: INTERNAL MEDICINE

## 2024-12-20 NOTE — PROGRESS NOTES
Results for orders placed or performed in visit on 12/20/24   Cardiac EP device report    Narrative    MDT/CRT-D (DDD MODE)/ NOT MRI CONDITIONAL  CARELINK TRANSMISSION: BATTERY VOLTAGE ADEQUATE. (10.2 YRS) AP <1%  99%. ALL AVAILABLE LEAD PARAMETERS WITHIN NORMAL LIMITS. NO SIGNIFICANT HIGH RATE EPISODES. 103 SVT EPISODES DETECTED MAX RATE 146 BPM. OPTI-VOL WITHIN NORMAL LIMITS. NORMAL DEVICE FUNCTION.---HEIN

## 2024-12-30 DIAGNOSIS — R10.13 DYSPEPSIA: ICD-10-CM

## 2024-12-30 DIAGNOSIS — I42.0 DILATED CARDIOMYOPATHY (HCC): ICD-10-CM

## 2024-12-31 RX ORDER — LISINOPRIL 5 MG/1
5 TABLET ORAL 2 TIMES DAILY
Qty: 60 TABLET | Refills: 5 | Status: SHIPPED | OUTPATIENT
Start: 2024-12-31

## 2024-12-31 RX ORDER — OMEPRAZOLE 40 MG/1
40 CAPSULE, DELAYED RELEASE ORAL DAILY
Qty: 90 CAPSULE | Refills: 1 | Status: SHIPPED | OUTPATIENT
Start: 2024-12-31

## 2025-01-13 ENCOUNTER — TELEPHONE (OUTPATIENT)
Dept: FAMILY MEDICINE CLINIC | Facility: CLINIC | Age: 45
End: 2025-01-13

## 2025-01-13 DIAGNOSIS — Z87.898 HISTORY OF MOTION SICKNESS: Primary | ICD-10-CM

## 2025-01-13 DIAGNOSIS — F41.9 ANXIETY: ICD-10-CM

## 2025-01-13 RX ORDER — SCOPOLAMINE 1 MG/3D
1 PATCH, EXTENDED RELEASE TRANSDERMAL
Qty: 4 PATCH | Refills: 0 | Status: SHIPPED | OUTPATIENT
Start: 2025-01-13

## 2025-01-13 RX ORDER — ALPRAZOLAM 0.25 MG/1
0.25 TABLET ORAL DAILY PRN
Qty: 10 TABLET | Refills: 0 | Status: SHIPPED | OUTPATIENT
Start: 2025-01-13

## 2025-01-13 NOTE — TELEPHONE ENCOUNTER
Pt is wondering if she may have a refill on Xanax and motion sickness patches she is going to be traveling starting on Wednesday, please send to [facility] [address]

## 2025-01-13 NOTE — TELEPHONE ENCOUNTER
Refill request for Xanax send to you. Please advise the patches. It looks like GI discontinued them

## 2025-01-26 DIAGNOSIS — I42.0 DILATED CARDIOMYOPATHY (HCC): ICD-10-CM

## 2025-01-27 RX ORDER — CARVEDILOL 25 MG/1
25 TABLET ORAL 2 TIMES DAILY
Qty: 180 TABLET | Refills: 1 | Status: SHIPPED | OUTPATIENT
Start: 2025-01-27

## 2025-02-05 ENCOUNTER — ANNUAL EXAM (OUTPATIENT)
Age: 45
End: 2025-02-05
Payer: COMMERCIAL

## 2025-02-05 VITALS
HEIGHT: 61 IN | BODY MASS INDEX: 36.63 KG/M2 | DIASTOLIC BLOOD PRESSURE: 78 MMHG | WEIGHT: 194 LBS | SYSTOLIC BLOOD PRESSURE: 120 MMHG

## 2025-02-05 DIAGNOSIS — Z12.31 SCREENING MAMMOGRAM FOR BREAST CANCER: ICD-10-CM

## 2025-02-05 DIAGNOSIS — Z01.419 ENCOUNTER FOR ANNUAL ROUTINE GYNECOLOGICAL EXAMINATION: Primary | ICD-10-CM

## 2025-02-05 PROBLEM — N80.03 ADENOMYOSIS OF THE UTERUS: Status: RESOLVED | Noted: 2024-02-19 | Resolved: 2025-02-05

## 2025-02-05 PROCEDURE — S0612 ANNUAL GYNECOLOGICAL EXAMINA: HCPCS | Performed by: OBSTETRICS & GYNECOLOGY

## 2025-02-05 NOTE — PROGRESS NOTES
Tara Marquez   1980    CC:  Yearly exam    S:  44 y.o. female here for yearly exam. She is s/p total laparoscopic hysterectomy for benign causes. She denies vaginal bleeding.     She denies vaginal discharge, itching, odor or dryness.     She has no urinary, bowel, breast concerns.     She is sexually active without pain, bleeding or dryness.    Last Mammo  8/7/2024 - [facility] 2  Last Colonoscopy  10/20/2023 - 10yr recall     Family hx of breast cancer: no  Family hx of ovarian cancer: no  Family hx of colon cancer: no      Current Outpatient Medications:     ALPRAZolam (XANAX) 0.25 mg tablet, Take 1 tablet (0.25 mg total) by mouth daily as needed for anxiety, Disp: 10 tablet, Rfl: 0    carvedilol (COREG) 25 mg tablet, TAKE 1 TABLET BY MOUTH TWICE A DAY, Disp: 180 tablet, Rfl: 1    lisinopril (ZESTRIL) 5 mg tablet, TAKE 1 TABLET BY MOUTH TWICE A DAY, Disp: 60 tablet, Rfl: 5    omeprazole (PriLOSEC) 40 MG capsule, TAKE 1 CAPSULE (40 MG TOTAL) BY MOUTH DAILY 30 MINUTES BEFORE BREAKFAST, Disp: 90 capsule, Rfl: 1    scopolamine (TRANSDERM-SCOP) 1 mg/3 days TD 72 hr patch, Place 1 patch on the skin over 72 hours every third day, Disp: 4 patch, Rfl: 0    Patient Active Problem List   Diagnosis    Anxiety    Cardiomyopathy (HCC)    Left bundle-branch block    Menorrhagia with regular cycle    Mitral regurgitation    Chest pain on breathing    Annual physical exam    Lipodystrophy    Class 1 obesity due to excess calories with serious comorbidity and body mass index (BMI) of 34.0 to 34.9 in adult    Paroxysmal atrial fibrillation (HCC)    Night sweats    Generalized abdominal pain    Pacemaker    History of motion sickness    Gastritis    Adenomyosis of the uterus    Pelvic pain    IFG (impaired fasting glucose)    Moderate mixed hyperlipidemia not requiring statin therapy    S/P laparoscopic hysterectomy     Past Medical History:   Diagnosis Date    Allergic rhinitis     Anxiety     Atrial fibrillation (HCC)      paroxysmal    Bunion 1985    Candidal vulvovaginitis     Cardiomyopathy (HCC)     Chest pain     CHF (congestive heart failure) (HCC)     chronic systolic    Coronary artery disease     Fibroid     GERD (gastroesophageal reflux disease)     Hammertoe of right foot     Herpes simplex     Irregular heart beat     LBBB (left bundle branch block)     Motion sickness     Normal delivery     2005 son, 2008 daughter    Pacemaker     AICD    Wears contact lenses     Wears glasses      Past Surgical History:   Procedure Laterality Date    BUNIONECTOMY Bilateral     BUNIONECTOMY Right 09/29/2022    Procedure: LAPIDUS BUNIONECTOMY;  Surgeon: Brandon Montemayor DPM;  Location: [facility];  Service: Podiatry    CARDIAC DEFIBRILLATOR PLACEMENT      CARDIAC ELECTROPHYSIOLOGY PROCEDURE N/A 03/14/2024    Procedure: Cardiac biv icd generator change;  Surgeon: Ravindra Moore DO;  Location: [facility];  Service: Cardiology    COLONOSCOPY      HYSTERECTOMY  2024    KY CYSTOURETHROSCOPY N/A 08/12/2024    Procedure: CYSTOSCOPY;  Surgeon: Erika Rodas MD;  Location: [facility];  Service: Gynecology    KY EXCISION EXCESSIVE SKIN & SUBQ TISSUE ABDOMEN N/A 05/29/2019    Procedure: CIRCUMFERENTIAL ABDOMINOPLASTY;  Surgeon: Froy Gann MD;  Location: [facility];  Service: Plastics    KY LAPS TOTAL HYSTERECT 250 GM/< W/RMVL TUBE/OVARY N/A 08/12/2024    Procedure: HYSTERECTOMY LAPAROSCOPIC TOTAL (LTH) WITH BILATERAL SALPINGECTOMY;  Surgeon: Erika Rodas MD;  Location: [facility];  Service: Gynecology    RECESSION GASTROC ENDOSCOPIC Right 09/29/2022    Procedure: RECESSION GASTROC ENDOSCOPIC;  Surgeon: Brandon Montemayor DPM;  Location: [facility];  Service: Podiatry    TOE OSTEOTOMY Right 09/29/2022    Procedure: 2ND HAMMERTOE REPAIR;  Surgeon: Brandon Montemayor DPM;  Location: [facility];  Service: Podiatry    TUBAL LIGATION  2009       Review of Systems   Respiratory: Negative.    Cardiovascular:  "Negative.    Gastrointestinal: Negative for constipation and diarrhea.   Genitourinary: Negative for difficulty urinating, pelvic pain, vaginal bleeding, vaginal discharge, itching, or odor.    O:  Blood pressure 120/78, height 5' 1\" (1.549 m), weight 88 kg (194 lb), not currently breastfeeding.    Patient appears well and is not in distress  Breasts are symmetrical without mass, tenderness, nipple discharge, skin changes or adenopathy.   Abdomen is soft and nontender without masses.   External genitals are normal without lesions or rashes.  Urethral meatus and urethra are normal  Vagina is without discharge or bleeding, small hymenal remnant v. Inclusion cyst at introitus   Cervix and uterus are surgically absent.  Adnexa have no masses, nontender     A:   Yearly exam.     P:   Mammo slip provided   Colonoscopy up to date   RTO for cyst removal if desired - can do in office based on exam today   RTO one year for yearly exam or sooner as needed.      "

## 2025-03-10 ENCOUNTER — OFFICE VISIT (OUTPATIENT)
Dept: FAMILY MEDICINE CLINIC | Facility: CLINIC | Age: 45
End: 2025-03-10
Payer: COMMERCIAL

## 2025-03-10 VITALS
HEIGHT: 61 IN | WEIGHT: 195.8 LBS | TEMPERATURE: 98.9 F | DIASTOLIC BLOOD PRESSURE: 80 MMHG | RESPIRATION RATE: 16 BRPM | SYSTOLIC BLOOD PRESSURE: 124 MMHG | OXYGEN SATURATION: 98 % | BODY MASS INDEX: 36.97 KG/M2 | HEART RATE: 75 BPM

## 2025-03-10 DIAGNOSIS — E78.2 MODERATE MIXED HYPERLIPIDEMIA NOT REQUIRING STATIN THERAPY: ICD-10-CM

## 2025-03-10 DIAGNOSIS — Z00.00 ANNUAL PHYSICAL EXAM: Primary | ICD-10-CM

## 2025-03-10 DIAGNOSIS — I48.0 PAROXYSMAL ATRIAL FIBRILLATION (HCC): ICD-10-CM

## 2025-03-10 DIAGNOSIS — E01.0 THYROMEGALY: ICD-10-CM

## 2025-03-10 DIAGNOSIS — R73.01 IFG (IMPAIRED FASTING GLUCOSE): ICD-10-CM

## 2025-03-10 DIAGNOSIS — Z13.29 SCREENING FOR THYROID DISORDER: ICD-10-CM

## 2025-03-10 DIAGNOSIS — I42.0 DILATED CARDIOMYOPATHY (HCC): ICD-10-CM

## 2025-03-10 PROCEDURE — 99396 PREV VISIT EST AGE 40-64: CPT | Performed by: NURSE PRACTITIONER

## 2025-03-10 NOTE — PATIENT INSTRUCTIONS
"Patient Education     Routine physical for adults   The Basics   Written by the doctors and editors at [facility]   What is a physical? -- A physical is a routine visit, or \"check-up,\" with your doctor. You might also hear it called a \"wellness visit\" or \"preventive visit.\"  During each visit, the doctor will:   Ask about your physical and mental health   Ask about your habits, behaviors, and lifestyle   Do an exam   Give you vaccines if needed   Talk to you about any medicines you take   Give advice about your health   Answer your questions  Getting regular check-ups is an important part of taking care of your health. It can help your doctor find and treat any problems you have. But it's also important for preventing health problems.  A routine physical is different from a \"sick visit.\" A sick visit is when you see a doctor because of a health concern or problem. Since physicals are scheduled ahead of time, you can think about what you want to ask the doctor.  How often should I get a physical? -- It depends on your age and health. In general, for people age 21 years and older:   If you are younger than 50 years, you might be able to get a physical every 3 years.   If you are 50 years or older, your doctor might recommend a physical every year.  If you have an ongoing health condition, like diabetes or high blood pressure, your doctor will probably want to see you more often.  What happens during a physical? -- In general, each visit will include:   Physical exam - The doctor or nurse will check your height, weight, heart rate, and blood pressure. They will also look at your eyes and ears. They will ask about how you are feeling and whether you have any symptoms that bother you.   Medicines - It's a good idea to bring a list of all the medicines you take to each doctor visit. Your doctor will talk to you about your medicines and answer any questions. Tell them if you are having any side effects that bother you. You " "should also tell them if you are having trouble paying for any of your medicines.   Habits and behaviors - This includes:   Your diet   Your exercise habits   Whether you smoke, drink alcohol, or use drugs   Whether you are sexually active   Whether you feel safe at home  Your doctor will talk to you about things you can do to improve your health and lower your risk of health problems. They will also offer help and support. For example, if you want to quit smoking, they can give you advice and might prescribe medicines. If you want to improve your diet or get more physical activity, they can help you with this, too.   Lab tests, if needed - The tests you get will depend on your age and situation. For example, your doctor might want to check your:   Cholesterol   Blood sugar   Iron level   Vaccines - The recommended vaccines will depend on your age, health, and what vaccines you already had. Vaccines are very important because they can prevent certain serious or deadly infections.   Discussion of screening - \"Screening\" means checking for diseases or other health problems before they cause symptoms. Your doctor can recommend screening based on your age, risk, and preferences. This might include tests to check for:   Cancer, such as breast, prostate, cervical, ovarian, colorectal, prostate, lung, or skin cancer   Sexually transmitted infections, such as chlamydia and gonorrhea   Mental health conditions like depression and anxiety  Your doctor will talk to you about the different types of screening tests. They can help you decide which screenings to have. They can also explain what the results might mean.   Answering questions - The physical is a good time to ask the doctor or nurse questions about your health. If needed, they can refer you to other doctors or specialists, too.  Adults older than 65 years often need other care, too. As you get older, your doctor will talk to you about:   How to prevent falling at " home   Hearing or vision tests   Memory testing   How to take your medicines safely   Making sure that you have the help and support you need at home  All topics are updated as new evidence becomes available and our peer review process is complete.  This topic retrieved from [employer] on: May 02, 2024.  Topic 614197 Version 1.0  Release: 32.4.3 - C32.122  © 2024 UpToDate, Inc. and/or its affiliates. All rights reserved.  Consumer Information Use and Disclaimer   Disclaimer: This generalized information is a limited summary of diagnosis, treatment, and/or medication information. It is not meant to be comprehensive and should be used as a tool to help the user understand and/or assess potential diagnostic and treatment options. It does NOT include all information about conditions, treatments, medications, side effects, or risks that may apply to a specific patient. It is not intended to be medical advice or a substitute for the medical advice, diagnosis, or treatment of a health care provider based on the health care provider's examination and assessment of a patient's specific and unique circumstances. Patients must speak with a health care provider for complete information about their health, medical questions, and treatment options, including any risks or benefits regarding use of medications. This information does not endorse any treatments or medications as safe, effective, or approved for treating a specific patient. UpToDate, Inc. and its affiliates disclaim any warranty or liability relating to this information or the use thereof.The use of this information is governed by the Terms of Use, available at https://www.woltersRenewDatauwer.com/en/know/clinical-effectiveness-terms. 2024© UpToDate, Inc. and its affiliates and/or licensors. All rights reserved.  Copyright   © 2024 UpToDate, Inc. and/or its affiliates. All rights reserved.

## 2025-03-10 NOTE — ASSESSMENT & PLAN NOTE
Other than enlarged thyroid, exam is unremarkable.  Encourage healthy diet and regular exercise.  Up to date with gyn, dental, and eye exams.  Colonoscopy up to date, next due 2033.

## 2025-03-10 NOTE — ASSESSMENT & PLAN NOTE
Due to repeat lipid panel.  Encourage healthy diet, regular exercise.   Suggestion made to try to work towards some more plant based foods.  Orders:    Lipid panel; Future

## 2025-03-10 NOTE — ASSESSMENT & PLAN NOTE
Noted previously as well.  Repeat TSH, check thyroid ultrasound.    Orders:    US thyroid; Future

## 2025-03-10 NOTE — PROGRESS NOTES
Adult Annual Physical  Name: Tara Marquez      : 1980      MRN: [id number]  Encounter Provider: JUAN LUIS Billingsley  Encounter Date: 3/10/2025   Encounter department: [facility]    Assessment & Plan  Annual physical exam  Other than enlarged thyroid, exam is unremarkable.  Encourage healthy diet and regular exercise.  Up to date with gyn, dental, and eye exams.  Colonoscopy up to date, next due .         Dilated cardiomyopathy (HCC)  Stable with current management.; follow up cardiology regularly       Paroxysmal atrial fibrillation (HCC)  Resolved;  continue to see cardiology regularly.       Moderate mixed hyperlipidemia not requiring statin therapy  Due to repeat lipid panel.  Encourage healthy diet, regular exercise.   Suggestion made to try to work towards some more plant based foods.  Orders:    Lipid panel; Future    IFG (impaired fasting glucose)    Orders:    Hemoglobin A1C; Future    Screening for thyroid disorder    Orders:    TSH, 3rd generation with Free T4 reflex; Future    Thyromegaly  Noted previously as well.  Repeat TSH, check thyroid ultrasound.    Orders:    US thyroid; Future      Immunizations and preventive care screenings were discussed with patient today. Appropriate education was printed on patient's after visit summary.    Counseling:  Dental Health: discussed importance of regular tooth brushing, flossing, and dental visits.  Injury prevention: discussed safety/seat belts, safety helmets, smoke detectors, carbon monoxide detectors, and smoking near bedding or upholstery.  Exercise: the importance of regular exercise/physical activity was discussed. Recommend exercise 3-5 times per week for at least 30 minutes.          History of Present Illness     Adult Annual Physical:  Patient presents for annual physical.     Diet and Physical Activity:  - Diet/Nutrition:. feels diet could be better, weight is up and down 5 lb.  Does better with cutting  "carbs, calories  - Exercise: 1-2 times a week on average and walking.    Depression Screening:  - PHQ-2 Score: 0    General Health:  - Sleep: sleeps well. 6-8 hours  - Hearing: normal hearing bilateral ears.  - Vision: no vision problems, goes for regular eye exams and wears glasses and contacts.  - Dental: regular dental visits.    /GYN Health:  - Follows with GYN: yes.     Review of Systems   Constitutional:  Negative for activity change, appetite change, chills, fatigue, fever and unexpected weight change.   HENT:  Negative for hearing loss.    Eyes:  Negative for visual disturbance.   Respiratory:  Negative for chest tightness and shortness of breath.    Cardiovascular:  Negative for chest pain, palpitations and leg swelling.   Gastrointestinal:  Negative for abdominal pain, constipation, diarrhea, nausea and vomiting.   Genitourinary:  Negative for difficulty urinating, dysuria and frequency.   Musculoskeletal:  Negative for arthralgias and myalgias.   Allergic/Immunologic: Negative for environmental allergies.   Neurological:  Negative for dizziness, weakness, numbness and headaches.   Psychiatric/Behavioral:  Negative for dysphoric mood and sleep disturbance. The patient is not nervous/anxious.          Objective   /80 (BP Location: Left arm, Patient Position: Sitting, Cuff Size: Large)   Pulse 75   Temp 98.9 °F (37.2 °C) (Tympanic)   Resp 16   Ht 5' 1.22\" (1.555 m)   Wt 88.8 kg (195 lb 12.8 oz)   LMP  (LMP Unknown)   SpO2 98%   BMI 36.73 kg/m²     Physical Exam  Vitals reviewed.   Constitutional:       General: She is awake. She is not in acute distress.     Appearance: Normal appearance. She is well-developed and well-groomed. She is not ill-appearing.   HENT:      Head: Normocephalic.      Right Ear: Hearing, tympanic membrane, ear canal and external ear normal.      Left Ear: Hearing, tympanic membrane, ear canal and external ear normal.      Nose: Nose normal.      Mouth/Throat:      " Pharynx: Uvula midline.   Eyes:      General: Lids are normal.      Conjunctiva/sclera: Conjunctivae normal.      Pupils: Pupils are equal, round, and reactive to light.   Neck:      Thyroid: Thyromegaly present. No thyroid mass.      Vascular: No carotid bruit or JVD.   Cardiovascular:      Rate and Rhythm: Normal rate and regular rhythm.      Pulses: Normal pulses.      Heart sounds: Normal heart sounds, S1 normal and S2 normal. No murmur heard.  Pulmonary:      Effort: Pulmonary effort is normal.      Breath sounds: Normal breath sounds.   Abdominal:      General: Abdomen is flat. Bowel sounds are normal.      Palpations: Abdomen is soft.      Tenderness: There is no abdominal tenderness.   Musculoskeletal:         General: Normal range of motion.      Cervical back: Full passive range of motion without pain.      Right lower leg: No edema.      Left lower leg: No edema.   Lymphadenopathy:      Head:      Right side of head: No submental, submandibular, tonsillar, preauricular, posterior auricular or occipital adenopathy.      Left side of head: No submental, submandibular, tonsillar, preauricular, posterior auricular or occipital adenopathy.      Cervical: No cervical adenopathy.   Skin:     General: Skin is warm and dry.   Neurological:      General: No focal deficit present.      Mental Status: She is alert and oriented to person, place, and time.      Sensory: No sensory deficit.      Motor: Motor function is intact.      Deep Tendon Reflexes:      Reflex Scores:       Patellar reflexes are 1+ on the right side and 1+ on the left side.  Psychiatric:         Attention and Perception: Attention normal.         Mood and Affect: Mood normal.         Speech: Speech normal.         Behavior: Behavior normal. Behavior is cooperative.         Thought Content: Thought content normal.         Cognition and Memory: Cognition normal.         Judgment: Judgment normal.

## 2025-03-21 ENCOUNTER — IN-CLINIC DEVICE VISIT (OUTPATIENT)
Dept: CARDIOLOGY CLINIC | Facility: CLINIC | Age: 45
End: 2025-03-21
Payer: COMMERCIAL

## 2025-03-21 ENCOUNTER — RESULTS FOLLOW-UP (OUTPATIENT)
Dept: CARDIOLOGY CLINIC | Facility: CLINIC | Age: 45
End: 2025-03-21

## 2025-03-21 DIAGNOSIS — Z95.810 PRESENCE OF AUTOMATIC CARDIOVERTER/DEFIBRILLATOR (AICD): Primary | ICD-10-CM

## 2025-03-21 PROCEDURE — 93284 PRGRMG EVAL IMPLANTABLE DFB: CPT | Performed by: INTERNAL MEDICINE

## 2025-03-21 NOTE — PROGRESS NOTES
Results for orders placed or performed in visit on 03/21/25   Cardiac EP device report    Narrative    MDT/CRT-D (DDD MODE)/ NOT MRI CONDITIONAL  DEVICE INTERROGATED IN THE [place] OFFICE. BATTERY VOLTAGE ADEQUATE (9.8 YR). AP <0.1%. TOTAL  98.5%. VSRP 1.2%. ALL LEAD PARAMETERS WITHIN NORMAL LIMITS. 1 EPISODE OF NSVT (10 @ 188). MUTLIPLE SVT/ST EPISODES @ 136-150 BPM, MAX DURATION 3 MIN 46 S. PT TAKES CARVEDILOL. OPTI-VOL WITHIN NORMAL LIMITS. WAVELET COLLECTED. NORMAL DEVICE FUNCTION. CJC/GV

## 2025-03-29 ENCOUNTER — APPOINTMENT (OUTPATIENT)
Dept: LAB | Age: 45
End: 2025-03-29
Payer: COMMERCIAL

## 2025-03-29 DIAGNOSIS — R73.01 IFG (IMPAIRED FASTING GLUCOSE): ICD-10-CM

## 2025-03-29 DIAGNOSIS — K76.0 FATTY LIVER: ICD-10-CM

## 2025-03-29 LAB
ALBUMIN SERPL BCG-MCNC: 4.2 G/DL (ref 3.5–5)
ALP SERPL-CCNC: 63 U/L (ref 34–104)
ALT SERPL W P-5'-P-CCNC: 19 U/L (ref 7–52)
ANION GAP SERPL CALCULATED.3IONS-SCNC: 10 MMOL/L (ref 4–13)
AST SERPL W P-5'-P-CCNC: 16 U/L (ref 13–39)
BASOPHILS # BLD AUTO: 0.05 THOUSANDS/ÂΜL (ref 0–0.1)
BASOPHILS NFR BLD AUTO: 1 % (ref 0–1)
BILIRUB SERPL-MCNC: 0.38 MG/DL (ref 0.2–1)
BUN SERPL-MCNC: 9 MG/DL (ref 5–25)
CALCIUM SERPL-MCNC: 9.1 MG/DL (ref 8.4–10.2)
CHLORIDE SERPL-SCNC: 101 MMOL/L (ref 96–108)
CO2 SERPL-SCNC: 26 MMOL/L (ref 21–32)
CREAT SERPL-MCNC: 0.53 MG/DL (ref 0.6–1.3)
EOSINOPHIL # BLD AUTO: 0.17 THOUSAND/ÂΜL (ref 0–0.61)
EOSINOPHIL NFR BLD AUTO: 2 % (ref 0–6)
ERYTHROCYTE [DISTWIDTH] IN BLOOD BY AUTOMATED COUNT: 13.3 % (ref 11.6–15.1)
EST. AVERAGE GLUCOSE BLD GHB EST-MCNC: 126 MG/DL
GFR SERPL CREATININE-BSD FRML MDRD: 114 ML/MIN/1.73SQ M
GLUCOSE P FAST SERPL-MCNC: 106 MG/DL (ref 65–99)
HBA1C MFR BLD: 6 %
HCT VFR BLD AUTO: 35.6 % (ref 34.8–46.1)
HGB BLD-MCNC: 11.4 G/DL (ref 11.5–15.4)
IMM GRANULOCYTES # BLD AUTO: 0.03 THOUSAND/UL (ref 0–0.2)
IMM GRANULOCYTES NFR BLD AUTO: 0 % (ref 0–2)
LYMPHOCYTES # BLD AUTO: 1.91 THOUSANDS/ÂΜL (ref 0.6–4.47)
LYMPHOCYTES NFR BLD AUTO: 25 % (ref 14–44)
MCH RBC QN AUTO: 27 PG (ref 26.8–34.3)
MCHC RBC AUTO-ENTMCNC: 32 G/DL (ref 31.4–37.4)
MCV RBC AUTO: 84 FL (ref 82–98)
MONOCYTES # BLD AUTO: 0.76 THOUSAND/ÂΜL (ref 0.17–1.22)
MONOCYTES NFR BLD AUTO: 10 % (ref 4–12)
NEUTROPHILS # BLD AUTO: 4.64 THOUSANDS/ÂΜL (ref 1.85–7.62)
NEUTS SEG NFR BLD AUTO: 62 % (ref 43–75)
NRBC BLD AUTO-RTO: 0 /100 WBCS
PLATELET # BLD AUTO: 211 THOUSANDS/UL (ref 149–390)
PMV BLD AUTO: 11.1 FL (ref 8.9–12.7)
POTASSIUM SERPL-SCNC: 4.3 MMOL/L (ref 3.5–5.3)
PROT SERPL-MCNC: 6.9 G/DL (ref 6.4–8.4)
RBC # BLD AUTO: 4.22 MILLION/UL (ref 3.81–5.12)
SODIUM SERPL-SCNC: 137 MMOL/L (ref 135–147)
WBC # BLD AUTO: 7.56 THOUSAND/UL (ref 4.31–10.16)

## 2025-03-29 PROCEDURE — 80053 COMPREHEN METABOLIC PANEL: CPT

## 2025-03-29 PROCEDURE — 36415 COLL VENOUS BLD VENIPUNCTURE: CPT

## 2025-03-29 PROCEDURE — 85025 COMPLETE CBC W/AUTO DIFF WBC: CPT

## 2025-03-29 PROCEDURE — 83036 HEMOGLOBIN GLYCOSYLATED A1C: CPT

## 2025-03-31 ENCOUNTER — OFFICE VISIT (OUTPATIENT)
Age: 45
End: 2025-03-31
Payer: COMMERCIAL

## 2025-03-31 VITALS
SYSTOLIC BLOOD PRESSURE: 120 MMHG | WEIGHT: 198.2 LBS | DIASTOLIC BLOOD PRESSURE: 84 MMHG | HEIGHT: 61 IN | BODY MASS INDEX: 37.42 KG/M2

## 2025-03-31 DIAGNOSIS — Q52.4 ABNORMALITY OF HYMEN: Primary | ICD-10-CM

## 2025-03-31 PROCEDURE — 56700 PRTL HYMNCTMY/REVJ HYMNL RNG: CPT | Performed by: OBSTETRICS & GYNECOLOGY

## 2025-03-31 PROCEDURE — 88305 TISSUE EXAM BY PATHOLOGIST: CPT | Performed by: PATHOLOGY

## 2025-03-31 NOTE — PROGRESS NOTES
Partial Removal of Hymen     Date/Time  3/31/2025 2:45 PM     Performed by  Erika Rodas MD   Authorized by  Erika Rodas MD     Universal Protocol   Consent: Verbal consent obtained.  Risks and benefits: risks, benefits and alternatives were discussed  Consent given by: patient  Patient understanding: patient states understanding of the procedure being performed  Patient identity confirmed: verbally with patient      Local anesthesia used: yes      Anesthesia: local infiltration     Anesthesia   Local anesthesia used: yes  Local Anesthetic: lidocaine 2% with epinephrine     Sedation   Patient sedated: no        Specimen: yes    Culture: no   Procedure Details   Procedure Notes: Area cleansed and local administered.  Remnant elevated with forceps and excised sharply using a scalpel.  Base sutured with 4-0 vicryl for excellent hemostasis.   Patient tolerance: patient tolerated the procedure well with no immediate complications

## 2025-04-01 ENCOUNTER — RESULTS FOLLOW-UP (OUTPATIENT)
Dept: FAMILY MEDICINE CLINIC | Facility: CLINIC | Age: 45
End: 2025-04-01

## 2025-04-04 PROCEDURE — 88305 TISSUE EXAM BY PATHOLOGIST: CPT | Performed by: PATHOLOGY

## 2025-04-16 ENCOUNTER — RESULTS FOLLOW-UP (OUTPATIENT)
Age: 45
End: 2025-04-16

## 2025-05-05 ENCOUNTER — OFFICE VISIT (OUTPATIENT)
Dept: GASTROENTEROLOGY | Facility: CLINIC | Age: 45
End: 2025-05-05
Payer: COMMERCIAL

## 2025-05-05 VITALS
BODY MASS INDEX: 37.57 KG/M2 | SYSTOLIC BLOOD PRESSURE: 126 MMHG | HEIGHT: 61 IN | DIASTOLIC BLOOD PRESSURE: 77 MMHG | WEIGHT: 199 LBS | TEMPERATURE: 98.7 F

## 2025-05-05 DIAGNOSIS — R10.13 DYSPEPSIA: Primary | ICD-10-CM

## 2025-05-05 DIAGNOSIS — F43.9 STRESS: ICD-10-CM

## 2025-05-05 PROCEDURE — 99213 OFFICE O/P EST LOW 20 MIN: CPT | Performed by: PHYSICIAN ASSISTANT

## 2025-05-05 RX ORDER — OMEPRAZOLE 20 MG/1
20 CAPSULE, DELAYED RELEASE ORAL DAILY
Qty: 90 CAPSULE | Refills: 2 | Status: SHIPPED | OUTPATIENT
Start: 2025-05-05

## 2025-05-05 NOTE — PROGRESS NOTES
Name: Tara Marquez      : 1980      MRN: [id number]  Encounter Provider: Tammy Adams PA-C  Encounter Date: 2025   Encounter department: [facility] [place]  :  Assessment & Plan  Dyspepsia  Barium swallow essentially normal.  At the time of her last office visit she wanted to hold off on EGD.  Today, she says she has been doing really well on omeprazole 40 mg daily.  She said she finds that the only time she gets breakthrough symptoms is when she is stressed.  She says she actually suspects that the cause of her initial symptoms were due to stress.  -I did discuss the gut brain connection in detail with the patient  -Decrease omeprazole 40 mg to 20 mg daily; I explained to her that if she does well on this then we can try to get her off of it completely at her next office visit.  I also explained that if she is not able to go down to the lower dose or if she is and we get her off of it at her next office visit and then the symptoms resume, I would like her to undergo an EGD for either instance.  She is in agreement  Orders:    omeprazole (PriLOSEC) 20 mg delayed release capsule; Take 1 capsule (20 mg total) by mouth daily 30 minutes before breakfast    Stress  See above.  -I explained to the patient that when she is ready, she should either let me or her family doctor know so we can refer her to see a therapist to discuss this further  -I also explained that if she does have ongoing symptoms and is unable to be off of PPI, The next step would be an EGD however if this is normal then she would again benefit from discussing her stress further with psych at that time           History of Present Illness   HPI  Tara Marquez is a 45 y.o. female who presents for follow-up.she says she has been doing really well on omeprazole 40 mg daily.  She said she finds that the only time she gets breakthrough symptoms is when she is stressed.  She says she actually suspects that  the cause of her initial symptoms were due to stress.  She otherwise denies any abdominal pain, nausea or vomiting, trouble swallowing, changes in bowel habits, bloody or black bowel movements.  History obtained from: patient    Review of Systems   Constitutional:  Negative for chills, fever and unexpected weight change.   Gastrointestinal:  Negative for abdominal distention, abdominal pain, anal bleeding, blood in stool, constipation, diarrhea, nausea, rectal pain and vomiting.   All other systems reviewed and are negative.    Medical History Reviewed by provider this encounter:     .  Past Medical History   Past Medical History:   Diagnosis Date    Allergic rhinitis     Anxiety     Atrial fibrillation (HCC)     paroxysmal    Bunion 1985    Candidal vulvovaginitis     Cardiomyopathy (HCC)     Chest pain     CHF (congestive heart failure) (HCC)     chronic systolic    Coronary artery disease     Fibroid     GERD (gastroesophageal reflux disease)     Hammertoe of right foot     Herpes simplex     Irregular heart beat     LBBB (left bundle branch block)     Motion sickness     Normal delivery     2005 son, 2008 daughter    Pacemaker     AICD    Wears contact lenses     Wears glasses      Past Surgical History:   Procedure Laterality Date    BUNIONECTOMY Bilateral     BUNIONECTOMY Right 09/29/2022    Procedure: LAPIDUS BUNIONECTOMY;  Surgeon: Brandon Montemayor DPM;  [facility];  Service: Podiatry    CARDIAC DEFIBRILLATOR PLACEMENT      CARDIAC ELECTROPHYSIOLOGY PROCEDURE N/A 03/14/2024    Procedure: Cardiac biv icd generator change;  Surgeon: Ravindra Moore DO;  [facility]: [facility];  Service: Cardiology    COLONOSCOPY      HYSTERECTOMY  2024    TX CYSTOURETHROSCOPY N/A 08/12/2024    Procedure: CYSTOSCOPY;  Surgeon: Erika Rodas MD;  Location: [facility];  Service: Gynecology    TX EXCISION EXCESSIVE SKIN & SUBQ TISSUE ABDOMEN N/A 05/29/2019    Procedure: CIRCUMFERENTIAL ABDOMINOPLASTY;   Surgeon: Froy Gann MD;  Location:  MAIN OR;  Service: Plastics    CA LAPS TOTAL HYSTERECT 250 GM/< W/RMVL TUBE/OVARY N/A 08/12/2024    Procedure: HYSTERECTOMY LAPAROSCOPIC TOTAL (LTH) WITH BILATERAL SALPINGECTOMY;  Surgeon: Erika Rodas MD;  Location:  MAIN OR;  Service: Gynecology    RECESSION GASTROC ENDOSCOPIC Right 09/29/2022    Procedure: RECESSION GASTROC ENDOSCOPIC;  Surgeon: Brandon Montemayor DPM;  [facility];  Service: Podiatry    TOE OSTEOTOMY Right 09/29/2022    Procedure: 2ND HAMMERTOE REPAIR;  Surgeon: Brandon Montemayor DPM;  [facility];  Service: Podiatry    TUBAL LIGATION  2009     Family History   Problem Relation Age of Onset    Hypertension Mother     Diabetes Father     Hypertension Father     Heart disease Father     Hyperlipidemia Father     No Known Problems Brother     No Known Problems Brother     No Known Problems Daughter     No Known Problems Son     No Known Problems Maternal Grandmother     No Known Problems Maternal Grandfather     No Known Problems Paternal Grandmother     No Known Problems Paternal Grandfather     No Known Problems Maternal Aunt     No Known Problems Paternal Aunt     No Known Problems Paternal Aunt     No Known Problems Paternal Aunt     Breast cancer Neg Hx     Colon cancer Neg Hx     Ovarian cancer Neg Hx       reports that she has never smoked. She has been exposed to tobacco smoke. She has never used smokeless tobacco. She reports that she does not currently use alcohol after a past usage of about 2.0 standard drinks of alcohol per week. She reports that she does not use drugs.  Current Outpatient Medications   Medication Instructions    ALPRAZolam (XANAX) 0.25 mg, Oral, Daily PRN    carvedilol (COREG) 25 mg, Oral, 2 times daily    lisinopril (ZESTRIL) 5 mg, Oral, 2 times daily    omeprazole (PRILOSEC) 20 mg, Oral, Daily, 30 minutes before breakfast   No Known Allergies   Current Outpatient Medications on File  Prior to Visit   Medication Sig Dispense Refill    ALPRAZolam (XANAX) 0.25 mg tablet Take 1 tablet (0.25 mg total) by mouth daily as needed for anxiety 10 tablet 0    carvedilol (COREG) 25 mg tablet TAKE 1 TABLET BY MOUTH TWICE A  tablet 1    lisinopril (ZESTRIL) 5 mg tablet TAKE 1 TABLET BY MOUTH TWICE A DAY 60 tablet 5    [DISCONTINUED] omeprazole (PriLOSEC) 40 MG capsule TAKE 1 CAPSULE (40 MG TOTAL) BY MOUTH DAILY 30 MINUTES BEFORE BREAKFAST 90 capsule 1     No current facility-administered medications on file prior to visit.      Social History     Tobacco Use    Smoking status: Never     Passive exposure: Past    Smokeless tobacco: Never   Vaping Use    Vaping status: Never Used   Substance and Sexual Activity    Alcohol use: Not Currently     Alcohol/week: 2.0 standard drinks of alcohol     Types: 2 Glasses of wine per week     Comment: social    Drug use: No    Sexual activity: Yes     Partners: Male     Birth control/protection: Female Sterilization        Objective   LMP  (LMP Unknown)      Physical Exam  Constitutional:       Appearance: Normal appearance.   Abdominal:      General: Bowel sounds are normal. There is no distension.      Palpations: There is no mass.      Tenderness: There is no abdominal tenderness. There is no guarding or rebound.   Neurological:      Mental Status: She is alert.         Administrative Statements   I have spent a total time of 20 minutes in caring for this patient on the day of the visit/encounter including Diagnostic results, Prognosis, Risks and benefits of tx options, Instructions for management, Patient and family education, Importance of tx compliance, Risk factor reductions, Impressions, Counseling / Coordination of care, Documenting in the medical record, Reviewing/placing orders in the medical record (including tests, medications, and/or procedures), Obtaining or reviewing history  , and Communicating with other healthcare professionals .

## 2025-05-12 ENCOUNTER — OFFICE VISIT (OUTPATIENT)
Age: 45
End: 2025-05-12
Payer: COMMERCIAL

## 2025-05-12 VITALS
HEART RATE: 89 BPM | HEIGHT: 60 IN | SYSTOLIC BLOOD PRESSURE: 120 MMHG | DIASTOLIC BLOOD PRESSURE: 80 MMHG | WEIGHT: 199.4 LBS | BODY MASS INDEX: 39.15 KG/M2 | TEMPERATURE: 98.3 F | RESPIRATION RATE: 16 BRPM

## 2025-05-12 DIAGNOSIS — E78.2 MODERATE MIXED HYPERLIPIDEMIA NOT REQUIRING STATIN THERAPY: ICD-10-CM

## 2025-05-12 DIAGNOSIS — R73.01 IFG (IMPAIRED FASTING GLUCOSE): ICD-10-CM

## 2025-05-12 DIAGNOSIS — E66.812 OBESITY, CLASS II, BMI 35-39.9: Primary | ICD-10-CM

## 2025-05-12 DIAGNOSIS — R73.03 PREDIABETES: ICD-10-CM

## 2025-05-12 PROCEDURE — 99244 OFF/OP CNSLTJ NEW/EST MOD 40: CPT | Performed by: PHYSICIAN ASSISTANT

## 2025-05-12 RX ORDER — TIRZEPATIDE 2.5 MG/.5ML
2.5 INJECTION, SOLUTION SUBCUTANEOUS WEEKLY
Qty: 2 ML | Refills: 0 | Status: SHIPPED | OUTPATIENT
Start: 2025-05-12 | End: 2025-05-16 | Stop reason: SDUPTHER

## 2025-05-12 NOTE — PROGRESS NOTES
Assessment/Plan:  Tara was seen today for consult.    Diagnoses and all orders for this visit:    Obesity, Class II, BMI 35-39.9    IFG (impaired fasting glucose)    Moderate mixed hyperlipidemia not requiring statin therapy    Prediabetes       No problem-specific Assessment & Plan notes found for this encounter.     - Discussed options of HealthyCORE-Intensive Lifestyle Intervention Program, Very Low Calorie Diet-VLCD, and Conservative Program and the role of weight loss medications.  - Explained the importance of making lifestyle changes first before starting anti-obesity medications.  - Patient should demonstrate lifestyle changes first before anti-obesity medication initiated.   - Patient is interested in pursuing HealthyCORE-Intensive Lifestyle Intervention Program, Very Low Calorie Diet-VLCD, and Conservative Program  - Initial weight loss goal of 5-10% weight loss for improved health as studies have shown this is where we see the greatest impact on improving health and decreasing risk of obesity related conditions.  - Weight loss can improve patient's co-morbid conditions and/or prevent weight-related complications.  - Stop Bang 4/8  - Labs reviewed: As below.      General Recommendations:  Nutrition:  Eat breakfast daily.  Do not skip meals.     Food log (ie.) www.[employer].com, sparkpeople.com, loseit.com, calorieking.com, etc.    Practice mindful eating.  Be sure to set aside time to eat, eat slowly, and savor your food.    Hydration:    At least 64oz of water daily.  No sugar sweetened beverages.  No juice (eat the fruit instead).    Exercise:  Studies have shown that the ideal exercise goal is somewhere between 150 to 300 minutes of moderate intensity exercise a week.  Start with exercising 10 minutes every other day and gradually increase physical activity with a goal of at least 150 minutes of moderate intensity exercise a week, divided over at least 3 days a week.  An example of this would be  exercising 30 minutes a day, 5 days a week.  Resistance training can increase muscle mass and increase our resting metabolic rate.   FULL BODY resistance training is recommended 2-3 times a week.  Do not do this on consecutive days to allow for muscle recovery.    Aim for a bare minimum 5000 steps, even on days you do not exercise.    Monitoring:   Weigh yourself daily.  If this causes undue stress, then just weigh yourself once a week.  Weigh yourself the same time of the day with the same amount of clothing on.  Preferably this should be done after waking up, before you eat, and with no clothing or minimal clothing on.    Specific Goals:  Food log (ie.) www.[employer].com,sparkpeople.com,loseit.com,calorieking.com,etc.   Gradually increase physical activity to a goal of 5 days per week for 30 minutes of MODERATE intensity PLUS 2 days per week of FULL BODY resistance training  Goal protein intake of 60-80 grams per day    Calorie goal: [id number] (90 grams protein) menu given, recommended dietitian visit (Provided with meal plan to follow).    Return visit:  3 months  1)  RX Zepbound 2.5mg x 4 weeks and then increase to 5mg once weekly. Emphasized the importance of adherence to the medication schedule and lifestyle modifications, including diet and exercise. Provided patient with written materials on Zepbound usage, dietary plans, and exercise recommendations. Discussed the importance of regular monitoring and follow up to ensure the safety and effectiveness of the treatment.  Education provided on side effects, how to monitor and when to see medical attention.  Patient received training on self-administration of the injection. Goal of treatment is to attain a weight loss of approximately 5-10% TBW within next 3-6 months.  Goal is to achieve weight loss to improve overall health and reduce risks associated with obesity and weight related comorbidities.      I have sent Zebound to your pharmacy. The prior  authorization process will been done through our prior authorization team and can take up to 3-4 weeks to process through the insurance.     - Start Zepbound 2.5 mg subcutaneously weekly. After you have taken the second pen, please give me an update, as we will likely increase the dose the next month if you are tolerating it well.    - Side effects of Zepbound include nausea, vomiting, diarrhea, or constipation. Keep an eye on your heart rate while on Zepbound. If you resting heart rate is greater than 100 beats per minutes, please notify me. If you develop severe abdominal pain, stop Zepbound and go to the emergency room, as that could be a sign of pancreatitis.     - Please notify me if you have surgery, upper endoscopy, or colonoscopy scheduled, as we typically hold Zepbound for one week prior to the procedure.     - Zepbound can reduce the effectiveness of oral hormonal birth control (birth control pills). Recommend a barrier backup method such as condoms to prevent pregnancy.       If Zepbound is not covered we discussed metformin and contrave as alternative options.     2) Referral to dietitian     Nutrition RX:  - start tracking intake  - focus on protein- goal is 30 grams per meal; 90 grams per day  - focus on increasing fiber first by increasing vegetable servings per day  - do not skip breakfast and goal water intake 64 oz daily    Physical Activity RX:  - Goal is 150-200 mins of activity weekly.  Try to include at least 2 strength training sessions; increasing lean body mass will really help you to lose weight and maintain weight loss.      Total time spent reviewing chart, interviewing patient, examining patient, discussing plan, answering all questions, and documentin min.       ______________________________________________________________________        Subjective:   Chief Complaint   Patient presents with    Consult     MWM- Consult; GW- 140lb ; Waist- 36.5in; Stop bang-        HPI: Syndia  Jimmy  is a 45 y.o. female with history of prediabetes, hyperlipidemia and excess weight, here to pursue weight loss management.  Previous notes and records have been reviewed.    Weight History:  Patient struggled with weight during middle school and high school. After she had her 2 children (16, 19) she found that it became harder to control. She tried weight watchers.     2017 patients dad passed away  2018 joined weight watchers, lost 50 lbs (lowest 138 lbs); held on to this for 2 years and then during COVID she gained the weight back. She started working from home, behind a desk all day. Going through perimenopause.   She will lose 5-10 lbs and then will gain it back     Physical Activity:  Walking pad at home-- tries to get on it but not consistent   Treadmill in basement  Dumbbells   [email or web address]     Weight History  Highest adult weight:: (Patient-Rptd) (P) 208 lbs  Lowest adult weight:: (Patient-Rptd) (P) 134 lbs  What is your goal weight?: (Patient-Rptd) (P) 140 lbs  How long have you struggled with maintaining a healthy weight?: (Patient-Rptd) (P) Adolescence  What weight loss attempts have you had in the past?: (Patient-Rptd) (P) Diet, Exercise, Commercial Programs (Weight Watchers, Y[employer]elin [employer]usuf, Etc)  Which commercial programs have you tried?: (Patient-Rptd) (P) Weight watchers    Food Behaviors  Do you have any of the following food related behaviors?: (Patient-Rptd) (P) Emotional Eating, Boredom Eating, Cravings  Is there a trouble area of the day when these behaviors are more prominent?: (Patient-Rptd) (P) No    Food History  Provide the time that you typically eat and common foods you eat for each meal/snack: : (Patient-Rptd) (P) Breakfast, Lunch, Dinner  Provide the time and common foods you eat for breakfast:: (Patient-Rptd) (P) Between 8am-9am-eggs, cheese,toast, oatmeal  Provide the time and common foods you eat for lunch:: (Patient-Rptd) (P) [id number],  leftovers, quick air boss food  Provide the time and common foods you eat for : (Patient-Rptd) (P) Between 6-7pm- pasta, rice, beef, chicken,dairy,bread,vegetables  How often do you go out to eat or have take out?: (Patient-Rptd) (P) 1-2x a wek  Daily beverage intake, please select the beverages you consume daily and the amount(s):: (Patient-Rptd) (P) Water  How many cups of water?: (Patient-Rptd) (P) 6  Do you consume alcohol?: (Patient-Rptd) (P) Yes  How many drinks per week and what type of alcohol?: (Patient-Rptd) (P) 2-3-vodka,wine, seltzers    Physical Activity   How often do you exercise?: (Patient-Rptd) (P) Not often  How long are your activity sessions?: (Patient-Rptd) (P) 30 mins  What types of physical activity are you currently participating in?: (Patient-Rptd) (P) Cardio (elliptical, walking, running, biking, rowing, etc)    Sleep  How many hours of sleep are you getting per night?: (Patient-Rptd) (P) 7  How would you rate your quality of sleep?: (Patient-Rptd) (P) Poor  Do you have a history of sleep apnea?: (Patient-Rptd) (P) No         Gynecological History  If of childbearing age, are you using birth control?: (Patient-Rptd) (P) N/A  Menopausal status?: (Patient-Rptd) (P) Premenopause      HPI  Wt Readings from Last 20 Encounters:   05/12/25 90.4 kg (199 lb 6.4 oz)   05/05/25 90.3 kg (199 lb)   03/31/25 89.9 kg (198 lb 3.2 oz)   03/10/25 88.8 kg (195 lb 12.8 oz)   02/05/25 88 kg (194 lb)   12/06/24 87.1 kg (192 lb)   11/08/24 90.2 kg (198 lb 14.4 oz)   09/25/24 90.9 kg (200 lb 6.4 oz)   08/27/24 90.5 kg (199 lb 9.6 oz)   08/12/24 89.1 kg (196 lb 6.4 oz)   08/07/24 87.1 kg (192 lb)   06/26/24 85.3 kg (188 lb)   04/09/24 85.6 kg (188 lb 12.8 oz)   03/22/24 82.6 kg (182 lb)   03/14/24 82.6 kg (182 lb)   03/08/24 82.6 kg (182 lb)   03/07/24 82.1 kg (181 lb)   02/19/24 81.5 kg (179 lb 9.6 oz)   01/05/24 78 kg (172 lb)   11/10/23 75.2 kg (165 lb 12.8 oz)             STOP-BANG Score:4/8  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; 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, meetings       Past Medical History:   Diagnosis Date    Allergic rhinitis     Anxiety     Atrial fibrillation (HCC)     paroxysmal    Bunion 1985    Candidal vulvovaginitis     Cardiomyopathy (HCC)     Chest pain     CHF (congestive heart failure) (HCC)     chronic systolic    Coronary artery disease     Fibroid     GERD (gastroesophageal reflux disease)     Hammertoe of right foot     Herpes simplex     Irregular heart beat     LBBB (left bundle branch block)     Motion sickness     Normal delivery     2005 son, 2008 daughter    Pacemaker     AICD    Wears contact lenses     Wears glasses      Patient denies personal and family history of  pancreatitis, thyroid cancer, MEN-2 tumors.  Denies any hx of glaucoma, seizures, kidney stones, gallstones.  Denies Hx of CAD, PAD, palpitations, arrhythmia.   Denies uncontrolled anxiety or depression, suicidal behavior or thinking , insomnia or sleep disturbance.   Past Surgical History:   Procedure Laterality Date    BUNIONECTOMY Bilateral     BUNIONECTOMY Right 09/29/2022    Procedure: LAPIDUS BUNIONECTOMY;  Surgeon: Brandon Montemayor DPM;  [facility];  Service: Podiatry    CARDIAC DEFIBRILLATOR PLACEMENT      CARDIAC ELECTROPHYSIOLOGY PROCEDURE N/A 03/14/2024    Procedure: Cardiac biv icd generator change;  Surgeon: Ravindra Moore DO;  [facility];  Service: Cardiology    COLONOSCOPY      HYSTERECTOMY  2024    UT CYSTOURETHROSCOPY N/A 08/12/2024    Procedure: CYSTOSCOPY;  Surgeon: Erika Rodas MD;  Location: [facility];  Service: Gynecology    UT EXCISION EXCESSIVE SKIN & SUBQ TISSUE ABDOMEN N/A 05/29/2019    Procedure: CIRCUMFERENTIAL ABDOMINOPLASTY;  Surgeon: Froy Gann MD;  Location:  MAIN OR;  Service: Plastics    UT LAPS TOTAL HYSTERECT 250 GM/< W/RMVL TUBE/OVARY N/A 08/12/2024    Procedure: HYSTERECTOMY LAPAROSCOPIC TOTAL (LTH) WITH BILATERAL SALPINGECTOMY;  Surgeon:  Erika Rodas MD;  [facility]: [facility];  Service: Gynecology    RECESSION GASTROC ENDOSCOPIC Right 09/29/2022    Procedure: RECESSION GASTROC ENDOSCOPIC;  Surgeon: Brandon Montemayor DPM;  [facility];  Service: Podiatry    TOE OSTEOTOMY Right 09/29/2022    Procedure: 2ND HAMMERTOE REPAIR;  Surgeon: Brandon Montemayor DPM;  [facility];  Service: Podiatry    TUBAL LIGATION  2009     The following portions of the patient's history were reviewed and updated as appropriate: allergies, current medications, past family history, past medical history, past social history, past surgical history, and problem list.    Review Of Systems:  Review of Systems   Constitutional:  Positive for fatigue.   HENT: Negative.     Eyes: Negative.    Gastrointestinal:  Negative for constipation, diarrhea and nausea.   Genitourinary: Negative.    Musculoskeletal: Negative.    Skin: Negative.    Allergic/Immunologic: Negative.    Neurological: Negative.  Negative for headaches.   Hematological: Negative.    Psychiatric/Behavioral: Negative.         Objective:  /80 (BP Location: Left arm, Patient Position: Sitting)   Pulse 89   Temp 98.3 °F (36.8 °C) (Tympanic)   Resp 16   Ht 5' (1.524 m)   Wt 90.4 kg (199 lb 6.4 oz)   LMP  (LMP Unknown)   BMI 38.94 kg/m²   Physical Exam  Vitals reviewed.   Constitutional:       Appearance: She is obese.   HENT:      Head: Normocephalic.      Mouth/Throat:      Mouth: Mucous membranes are moist.   Eyes:      Pupils: Pupils are equal, round, and reactive to light.   Cardiovascular:      Rate and Rhythm: Normal rate and regular rhythm.   Pulmonary:      Effort: Pulmonary effort is normal.      Breath sounds: Normal breath sounds.   Abdominal:      General: Bowel sounds are normal.      Palpations: Abdomen is soft.   Musculoskeletal:         General: Normal range of motion.      Cervical back: Normal range of motion.   Skin:     General: Skin is warm and dry.    Neurological:      General: No focal deficit present.      Mental Status: She is alert.   Psychiatric:         Mood and Affect: Mood normal.         Thought Content: Thought content normal.         Judgment: Judgment normal.         Labs and Imaging  Recent labs and imaging have been personally reviewed.  Lab Results   Component Value Date    WBC 7.56 03/29/2025    HGB 11.4 (L) 03/29/2025    HCT 35.6 03/29/2025    MCV 84 03/29/2025     03/29/2025     Lab Results   Component Value Date    SODIUM 137 03/29/2025    K 4.3 03/29/2025     03/29/2025    CO2 26 03/29/2025    AGAP 10 03/29/2025    BUN 9 03/29/2025    CREATININE 0.53 (L) 03/29/2025    GLUC 112 03/14/2024    GLUF 106 (H) 03/29/2025    CALCIUM 9.1 03/29/2025    AST 16 03/29/2025    ALT 19 03/29/2025    ALKPHOS 63 03/29/2025    TP 6.9 03/29/2025    TBILI 0.38 03/29/2025    EGFR 114 03/29/2025     Lab Results   Component Value Date    HGBA1C 6.0 (H) 03/29/2025     Lab Results   Component Value Date    [id number] 2.400 03/12/2023     Lab Results   Component Value Date    CHOLESTEROL 214 (H) 03/10/2024     Lab Results   Component Value Date    HDL 47 (L) 03/10/2024     Lab Results   Component Value Date    TRIG 194 (H) 03/10/2024     Lab Results   Component Value Date    LDLCALC 128 (H) 03/10/2024

## 2025-05-13 ENCOUNTER — TELEPHONE (OUTPATIENT)
Age: 45
End: 2025-05-13

## 2025-05-13 NOTE — TELEPHONE ENCOUNTER
PA for Zepbound SUBMITTED to [employer]    via    [x]CMM-KEY: [id number]  []Surescripts-Case ID #    []Availity-Auth ID #  NDC #    []Faxed to plan   []Other website    []Phone call Case ID #      []PA sent as URGENT    All office notes, labs and other pertaining documents and studies sent. Clinical questions answered. Awaiting determination from insurance company.     Turnaround time for your insurance to make a decision on your Prior Authorization can take 7-21 business days.

## 2025-05-14 NOTE — TELEPHONE ENCOUNTER
PA for Zepbound  APPROVED     Date(s) approved 5/13/2025-5/13/2026    Case #    Patient advised by          []Hello Musichart Message  [x]Phone call   []LMOM  []L/M to call office as no active Communication consent on file  []Unable to leave detailed message as VM not approved on Communication consent       Pharmacy advised by    [x]Fax  []Phone call  []Secure Chat    Specialty Pharmacy    []     Approval letter scanned into Media Yes

## 2025-05-15 DIAGNOSIS — R73.03 PREDIABETES: ICD-10-CM

## 2025-05-15 DIAGNOSIS — E78.2 MODERATE MIXED HYPERLIPIDEMIA NOT REQUIRING STATIN THERAPY: ICD-10-CM

## 2025-05-15 DIAGNOSIS — E66.812 OBESITY, CLASS II, BMI 35-39.9: ICD-10-CM

## 2025-05-15 RX ORDER — TIRZEPATIDE 2.5 MG/.5ML
2.5 INJECTION, SOLUTION SUBCUTANEOUS WEEKLY
Qty: 2 ML | Refills: 0 | Status: CANCELLED | OUTPATIENT
Start: 2025-05-15 | End: 2025-06-12

## 2025-05-15 NOTE — TELEPHONE ENCOUNTER
This is not a duplicate.   Change pharmacy  Reason for call:   [x] Refill   [] Prior Auth  [] Other:     Office:   [] PCP/Provider -   [x] Specialty/Provider - [facility] [place]  Authorized By: Maria Teresa Bland PA-C      Medication: tirzepatide (Zepbound) 2.5 mg/0.5 mL auto-injector    Dose/Frequency: Inject 0.5 mL (2.5 mg total) under the skin once a week for 28 days    Quantity: 2ml    Pharmacy: [address] [place], [place] [address] [facility]   Does the patient have enough for 3 days?   [] Yes   [x] No - Send as HP to POD    Mail Away Pharmacy   Does the patient have enough for 10 days?   [] Yes   [] No - Send as HP to POD

## 2025-05-16 DIAGNOSIS — E66.812 OBESITY, CLASS II, BMI 35-39.9: ICD-10-CM

## 2025-05-16 DIAGNOSIS — E78.2 MODERATE MIXED HYPERLIPIDEMIA NOT REQUIRING STATIN THERAPY: ICD-10-CM

## 2025-05-16 DIAGNOSIS — R73.03 PREDIABETES: ICD-10-CM

## 2025-05-16 RX ORDER — TIRZEPATIDE 2.5 MG/.5ML
2.5 INJECTION, SOLUTION SUBCUTANEOUS WEEKLY
Qty: 2 ML | Refills: 0 | Status: SHIPPED | OUTPATIENT
Start: 2025-05-16 | End: 2025-06-13

## 2025-05-16 NOTE — TELEPHONE ENCOUNTER
Called pt to go over the refill request question, the pt hasn't started the medication yet and the pt stated that she been looking for a pharmacy, I sent the provider a message of where she would like the medication sent since [facility] has it in stock.     Pt wants the medication sent to the [facility] in [address] [address]

## 2025-05-28 DIAGNOSIS — E66.812 OBESITY, CLASS II, BMI 35-39.9: Primary | ICD-10-CM

## 2025-05-28 DIAGNOSIS — E78.2 MODERATE MIXED HYPERLIPIDEMIA NOT REQUIRING STATIN THERAPY: ICD-10-CM

## 2025-05-28 DIAGNOSIS — R73.03 PREDIABETES: ICD-10-CM

## 2025-05-28 RX ORDER — TIRZEPATIDE 5 MG/.5ML
5 INJECTION, SOLUTION SUBCUTANEOUS WEEKLY
Qty: 2 ML | Refills: 2 | Status: SHIPPED | OUTPATIENT
Start: 2025-05-28 | End: 2025-08-20

## 2025-06-03 NOTE — PROGRESS NOTES
Weight Management Medical Nutrition Assessment  Name was verified by patient stating name? Yes   verified by patient stating? Yes  Verified the patient is alone? Yes  Offered patient a live visit but are now consenting to this virtual visit? Yes  Verified with the patient this visit will go towards their pre paid charges OR they will be contacted after the visit to collect payment? Yes   Verified with the patient they are in the state of [place]? Yes     Tara presented for a meal planning session. Today's self reported weight is 187.6##. She lost 11.4# since visit on 25. Will start 5mg Zepbound this Saturday. Had success with WW program and lost 60#. Experienced weight regain during COVID. Note she is in a better head space now and is very motivated. Per dietary recall she is having 1 protein source with all meals and snacks. Energy intake remains consistent even on active days. Fluid intake improved and she is drinking 65-75oz daily. RD encouraged additional calories when active and will benefit from 20-25g protein with meals. Developed and reviewed a low calorie meal plan. RD emailed meal plan, snack lists, recipe resources, and 300-400kcal meal ideas.    Keep up the great work!    Patient seen by Medical Provider in past 6 months:  yes  Requested to schedule appointment with Medical Provider: No      Anthropometric Measurements  Start Weight (#) & Date:  199# 25  Current Weight (#): 187.6# (self reported)  TBW % Change from start weight: 5.8%  Ideal Body Weight (#): 100#  Goal Weight (#): 140#  Highest: 208#  Lowest: 134#    Weight Loss History  Previous weight loss attempts: Commercial Programs (Weight Watchers, Yoselin Yusuf, etc.)  Diet- lost 60#    Food and Nutrition Related History  Wake up: 6:30-7AM   Bed Time: 9:30-10:30PM    Food Recall  Breakfast: 7AM Premier protein shake + egg white omelet or 2 boiled eggs with turkey sausage     Snack: skip  Lunch: 11:45AM-1PM air fried boneless/skinless  chicken breast + side salad OR chicken quesdailla OR 2 slices multi grain bread with turkey breast  Snack: babybell cheese OR string cheese OR turkey slices OR Quest protein chips  Dinner: 5:45-6:45PM chicken + 1/2 cup white rice + asparagus OR steak + side salad OR protein mac & cheese  Snack: blueberries/strawberries + fat-free Greek yogurt + honey    Beverages: 65-75oz water, Premier protein shake, occasional diet Coke Cola  Alcohol: not since she started Zepbound  Volume of beverage intake: 65-75oz    Weekends: Same  Cravings: sweets  Trouble area of day: after dinner    Frequency of Eating out: 1-2x per week (enjoys [employer] and local Greek restaurant- orders protein based meals)  Food restrictions: n/a  Cooking: self   Food Shopping: self    Physical Activity Intake-works remotely from home. Sedentary job.  Activity: Walking pad, dumbells (30 minutes)  Frequency: occasionally  Physical limitations/barriers to exercise:  n/a    Estimated Needs  Energy  SECA: BMR: n/a      X 1.3 -1000 =  [facility] Energy Needs: BMR : 1405   0.5-1# loss weekly sedentary:  5214-6127             0.5-1# loss weekly lightly active: 4930-4799  Maintenance calories for sedentary activity level: 1686  Protein: 55-68      (1.2-1.5g/kg IBW)  Fluid Requirement Calculator   Total Fluid: 97   oz  ([place]-Segar Method)  Free Fluid: 78 oz ([place]-Segar Method - 20%)    Nutrition Diagnosis  Yes;    Overweight/obesity  related to Food and nutrition related knowledge deficit as evidenced by  BMI more than normative standard for age and sex (obesity-grade II 35-39.9)       Nutrition Intervention    Nutrition Prescription  Calories: 5097-3060  Protein: 75-85  Fluid: 78    Meal Plan (Britton/Pro/Carb)  Breakfast: 300-350/20-25  Snack:  Lunch: 300-350/20-25  Snack: 100-150/5-10  Dinner: 350-400/20-25  Snack: 100-150/5-10    Nutrition Education:    Calorie controlled menu  Lean protein food choices  Healthy snack options  Food journaling  tips      Nutrition Counseling:  Strategies: meal planning, portion sizes, healthy snack choices, hydration, fiber intake, protein intake, exercise, food journal      Monitoring and Evaluation:  Evaluation criteria:  Energy Intake  Meet protein needs  Maintain adequate hydration  Monitor weekly weight  Meal planning/preparation  Food journal   Decreased portions at mealtimes and snacks  Physical activity     Barriers to learning:none  Readiness to change: Action:  (Changing behavior)  Comprehension: very good  Expected Compliance: very good

## 2025-06-09 ENCOUNTER — CLINICAL SUPPORT (OUTPATIENT)
Dept: BARIATRICS | Facility: CLINIC | Age: 45
End: 2025-06-09
Payer: COMMERCIAL

## 2025-06-09 VITALS — HEIGHT: 60 IN | WEIGHT: 187.6 LBS | BODY MASS INDEX: 36.83 KG/M2

## 2025-06-09 DIAGNOSIS — R63.5 ABNORMAL WEIGHT GAIN: Primary | ICD-10-CM

## 2025-06-09 PROCEDURE — RECHECK

## 2025-06-09 PROCEDURE — WMDI30

## 2025-06-16 ENCOUNTER — APPOINTMENT (OUTPATIENT)
Dept: LAB | Age: 45
End: 2025-06-16
Payer: COMMERCIAL

## 2025-06-16 ENCOUNTER — HOSPITAL ENCOUNTER (OUTPATIENT)
Dept: RADIOLOGY | Age: 45
Discharge: HOME | End: 2025-06-16
Payer: COMMERCIAL

## 2025-06-16 DIAGNOSIS — E78.2 MODERATE MIXED HYPERLIPIDEMIA NOT REQUIRING STATIN THERAPY: ICD-10-CM

## 2025-06-16 DIAGNOSIS — E01.0 THYROMEGALY: ICD-10-CM

## 2025-06-16 DIAGNOSIS — Z13.29 SCREENING FOR THYROID DISORDER: ICD-10-CM

## 2025-06-16 LAB
CHOLEST SERPL-MCNC: 193 MG/DL (ref ?–200)
HDLC SERPL-MCNC: 35 MG/DL
LDLC SERPL CALC-MCNC: 116 MG/DL (ref 0–100)
NONHDLC SERPL-MCNC: 158 MG/DL
TRIGL SERPL-MCNC: 209 MG/DL (ref ?–150)
TSH SERPL DL<=0.05 MIU/L-ACNC: 2.86 UIU/ML (ref 0.45–4.5)

## 2025-06-16 PROCEDURE — 36415 COLL VENOUS BLD VENIPUNCTURE: CPT

## 2025-06-16 PROCEDURE — 84443 ASSAY THYROID STIM HORMONE: CPT

## 2025-06-16 PROCEDURE — 76536 US EXAM OF HEAD AND NECK: CPT

## 2025-06-16 PROCEDURE — 80061 LIPID PANEL: CPT

## 2025-06-22 DIAGNOSIS — I42.0 DILATED CARDIOMYOPATHY (HCC): ICD-10-CM

## 2025-06-22 RX ORDER — LISINOPRIL 5 MG/1
5 TABLET ORAL 2 TIMES DAILY
Qty: 60 TABLET | Refills: 5 | Status: SHIPPED | OUTPATIENT
Start: 2025-06-22

## 2025-06-25 ENCOUNTER — REMOTE DEVICE CLINIC VISIT (OUTPATIENT)
Dept: CARDIOLOGY CLINIC | Facility: CLINIC | Age: 45
End: 2025-06-25
Payer: COMMERCIAL

## 2025-06-25 DIAGNOSIS — Z95.810 AICD (AUTOMATIC CARDIOVERTER/DEFIBRILLATOR) PRESENT: Primary | ICD-10-CM

## 2025-06-25 PROCEDURE — 93295 DEV INTERROG REMOTE 1/2/MLT: CPT | Performed by: INTERNAL MEDICINE

## 2025-06-25 PROCEDURE — 93296 REM INTERROG EVL PM/IDS: CPT | Performed by: INTERNAL MEDICINE

## 2025-06-25 NOTE — PROGRESS NOTES
"Results for orders placed or performed in visit on 06/25/25   Cardiac EP device report    Narrative    MDT/CRT-D (DDD MODE)/ NOT MRI CONDITIONAL  CARELINK TRANSMISSION: PRESENTING RHYTHM SHOWS AS @ 92 BPM. BATTERY STATUS \"9 YRS.\" AP 0% CRT PACING (BIV) 10% (LV) 90%. ALL AVAILABLE LEAD PARAMETERS WITHIN NORMAL LIMITS. 88 SVT/ST NOTED; AVG RATES APPROX 140 BPM. PT ON COREG & EF 55% (ECHO 2024). NO THERAPIES NEEDED. VENT SENSING MARKERS AVAIL . OPTI-VOL WITHIN NORMAL LIMITS. NORMAL DEVICE FUNCTION. NC           "

## 2025-07-02 ENCOUNTER — HOSPITAL ENCOUNTER (OUTPATIENT)
Dept: RADIOLOGY | Age: 45
Discharge: HOME | End: 2025-07-02
Payer: COMMERCIAL

## 2025-07-02 VITALS — BODY MASS INDEX: 34.36 KG/M2 | HEIGHT: 61 IN | WEIGHT: 182 LBS

## 2025-07-02 DIAGNOSIS — Z12.31 SCREENING MAMMOGRAM FOR BREAST CANCER: ICD-10-CM

## 2025-07-02 PROCEDURE — 77063 BREAST TOMOSYNTHESIS BI: CPT

## 2025-07-02 PROCEDURE — 77067 SCR MAMMO BI INCL CAD: CPT

## 2025-07-21 DIAGNOSIS — I42.0 DILATED CARDIOMYOPATHY (HCC): ICD-10-CM

## 2025-07-22 RX ORDER — CARVEDILOL 25 MG/1
25 TABLET ORAL 2 TIMES DAILY
Qty: 60 TABLET | Refills: 5 | Status: SHIPPED | OUTPATIENT
Start: 2025-07-22

## (undated) DEVICE — TUBING LIPOSUCTION ASPIRATION 12FT STERILE

## (undated) DEVICE — BETHLEHEM UNIVERSAL GYN LAP PK: Brand: CARDINAL HEALTH

## (undated) DEVICE — DRILL BIT CANN 2.7MM

## (undated) DEVICE — TUBING SUCTION 5MM X 12 FT

## (undated) DEVICE — DRAPE SHEET THREE QUARTER

## (undated) DEVICE — NEEDLE FILTER 5 MICR 19G X 1.5IN

## (undated) DEVICE — TOWEL SURG XR DETECT GREEN STRL RFD

## (undated) DEVICE — SUT ETHILON 4-0 PS-2 18 IN 1667H

## (undated) DEVICE — ACE WRAP 4 IN UNSTERILE

## (undated) DEVICE — SUT VICRYL 2-0 J459H

## (undated) DEVICE — NEEDLE 25G X 1 1/2

## (undated) DEVICE — INTENDED FOR TISSUE SEPARATION, AND OTHER PROCEDURES THAT REQUIRE A SHARP SURGICAL BLADE TO PUNCTURE OR CUT.: Brand: BARD-PARKER ® CARBON RIB-BACK BLADES

## (undated) DEVICE — CHLORHEXIDINE 4PCT 4 OZ

## (undated) DEVICE — STERILE POLYISOPRENE POWDER-FREE SURGICAL GLOVES: Brand: PROTEXIS

## (undated) DEVICE — TROCAR: Brand: KII FIOS FIRST ENTRY

## (undated) DEVICE — WEBRIL 6 IN UNSTERILE

## (undated) DEVICE — HEAVY DUTY TABLE COVER: Brand: CONVERTORS

## (undated) DEVICE — SUT ETHILON 5-0 PS-2 18 IN 1666H

## (undated) DEVICE — SKIN MARKER DUAL TIP WITH RULER CAP, FLEXIBLE RULER AND LABELS: Brand: DEVON

## (undated) DEVICE — INTENDED FOR TISSUE SEPARATION, AND OTHER PROCEDURES THAT REQUIRE A SHARP SURGICAL BLADE TO PUNCTURE OR CUT.: Brand: BARD-PARKER ® SAFETYLOCK CARBON RIB-BACK BLADES

## (undated) DEVICE — ULNAR NERVE PROTECTOR FOAM POSITIONER: Brand: CARDINAL HEALTH

## (undated) DEVICE — TRAY FOLEY 16FR URIMETER SILICONE SURESTEP

## (undated) DEVICE — SUT PROLENE 3-0 PC-5 18 IN 8632G

## (undated) DEVICE — BETHLEHEM UNIVERSAL  MIONR EXT: Brand: CARDINAL HEALTH

## (undated) DEVICE — SMOKE EVACUATION TUBING WITH 7/8" HOSE TO HOSE CONNECTOR: Brand: BUFFALO FILTER

## (undated) DEVICE — EXOFIN PRECISION PEN HIGH VISCOSITY TOPICAL SKIN ADHESIVE: Brand: EXOFIN PRECISION PEN, 1G

## (undated) DEVICE — PREP PAD BNS: Brand: CONVERTORS

## (undated) DEVICE — SPONGE 4 X 4 XRAY 16 PLY STRL LF RFD

## (undated) DEVICE — BLADE OSCILLATING SAW 1/2

## (undated) DEVICE — STRETCH BANDAGE: Brand: CURITY

## (undated) DEVICE — SUT VICRYL 4-0 PS-2 18 IN J496G

## (undated) DEVICE — 3M™ TEGADERM™ TRANSPARENT FILM DRESSING FRAME STYLE, 1626W, 4 IN X 4-3/4 IN (10 CM X 12 CM), 50/CT 4CT/CASE: Brand: 3M™ TEGADERM™

## (undated) DEVICE — ELECTRODE LAP J HOOK E-Z CLEAN 33CM-0021

## (undated) DEVICE — SUT ETHILON 3-0 PS-1 18 IN 1663H

## (undated) DEVICE — PAD GROUNDING ADULT

## (undated) DEVICE — GAUZE SPONGES,16 PLY: Brand: CURITY

## (undated) DEVICE — GLOVE SRG BIOGEL 7.5

## (undated) DEVICE — PREMIUM DRY TRAY LF: Brand: MEDLINE INDUSTRIES, INC.

## (undated) DEVICE — PENCIL SMOKE EVAC TELESCOPING W/TUBING

## (undated) DEVICE — CHLORAPREP HI-LITE 26ML ORANGE

## (undated) DEVICE — SUT MONOCRYL 4-0 PS-2 27 IN Y426H

## (undated) DEVICE — MEDI-VAC YANKAUER SUCTION HANDLE W/STRAIGHT TIP & CONTROL VENT: Brand: CARDINAL HEALTH

## (undated) DEVICE — DRAPE EQUIPMENT RF WAND

## (undated) DEVICE — ENSEAL X1 TISSUE SEALER, CURVED JAW, 37 CM SHAFT LENGTH: Brand: ENSEAL

## (undated) DEVICE — LAPAROSCOPIC TROCAR SLEEVE/SINGLE USE: Brand: KII® OPTICAL ACCESS SYSTEM

## (undated) DEVICE — PLUMEPEN PRO 10FT

## (undated) DEVICE — SYRINGE 3ML LL

## (undated) DEVICE — TABLE COVER: Brand: CONVERTORS

## (undated) DEVICE — SYRINGE 10ML LL CONTROL TOP

## (undated) DEVICE — MAXI PAD5.51 X 13.78 IN. (14.0 X 35.0 CM)HEAVYCONTOUREDUNSCENTED: Brand: CURITY

## (undated) DEVICE — STANDARD SURGICAL GOWN, L: Brand: CONVERTORS

## (undated) DEVICE — TUBE SURGICAL IRRIGATION

## (undated) DEVICE — 4-PORT MANIFOLD: Brand: NEPTUNE 2

## (undated) DEVICE — NEEDLE BLUNT 18 G X 1 1/2IN

## (undated) DEVICE — MAYO STAND COVER: Brand: CONVERTORS

## (undated) DEVICE — SUT VICRYL 2-0 SH 27 IN UNDYED J417H

## (undated) DEVICE — 40583 XL ADVANCED TRENDELENBURG POSITIONING KIT: Brand: 40583 XL ADVANCED TRENDELENBURG POSITIONING KIT

## (undated) DEVICE — 10FR FRAZIER SUCTION HANDLE: Brand: CARDINAL HEALTH

## (undated) DEVICE — CAST PADDING 4 IN SYNTHETIC NON-STRL

## (undated) DEVICE — UTERINE MANIPULATOR RUMI 6.7 X 8 CM

## (undated) DEVICE — DRAPE C-ARM X-RAY

## (undated) DEVICE — DRAPE TOWEL: Brand: CONVERTORS

## (undated) DEVICE — TUBING INFILTRATION 9FT

## (undated) DEVICE — SYRINGE 10ML LL

## (undated) DEVICE — KERLIX BANDAGE ROLL: Brand: KERLIX

## (undated) DEVICE — WET SKIN PREP TRAY: Brand: MEDLINE INDUSTRIES, INC.

## (undated) DEVICE — OCCLUDER COLPO-PNEUMO

## (undated) DEVICE — SUT VICRYL 4-0 PS-2 27 IN J426H

## (undated) DEVICE — BASIC PACK: Brand: CONVERTORS

## (undated) DEVICE — PACK UNIVERSAL DRAPES SUB-Q ICD

## (undated) DEVICE — INTENDED FOR TISSUE SEPARATION, AND OTHER PROCEDURES THAT REQUIRE A SHARP SURGICAL BLADE TO PUNCTURE OR CUT.: Brand: BARD-PARKER SAFETY BLADES SIZE 11, STERILE

## (undated) DEVICE — 2, DISPOSABLE SUCTION/IRRIGATOR WITHOUT DISPOSABLE TIP: Brand: STRYKEFLOW

## (undated) DEVICE — BLADE ENDO TRAC

## (undated) DEVICE — TUBING SMOKE EVAC W/FILTRATION DEVICE PLUMEPORT ACTIV

## (undated) DEVICE — SUT VICRYL 0 CT-1 CR/8 27 IN JJ41G

## (undated) DEVICE — DRILL BIT

## (undated) DEVICE — DRAIN CHANNEL RND FULL FLUTED 19FR W/TROCAR

## (undated) DEVICE — COBAN 4 IN STERILE

## (undated) DEVICE — PADDING CAST 4 IN  COTTON STRL

## (undated) DEVICE — Device

## (undated) DEVICE — ARTHROSCOPY FLOOR MAT

## (undated) DEVICE — 2000CC GUARDIAN II: Brand: GUARDIAN

## (undated) DEVICE — GLOVE INDICATOR PI UNDERGLOVE SZ 7 BLUE

## (undated) DEVICE — TROCAR: Brand: KII® SLEEVE

## (undated) DEVICE — K-WIRE
Type: IMPLANTABLE DEVICE | Site: FOOT | Status: NON-FUNCTIONAL
Removed: 2022-09-29

## (undated) DEVICE — CHEST/BREAST DRAPE: Brand: CONVERTORS

## (undated) DEVICE — COUNTERSINK 4MM

## (undated) DEVICE — NDL CNTR 20CT FM MAG: Brand: MEDLINE INDUSTRIES, INC.

## (undated) DEVICE — 3M™ STERI-STRIP™ REINFORCED ADHESIVE SKIN CLOSURES, R1547, 1/2 IN X 4 IN (12 MM X 100 MM), 6 STRIPS/ENVELOPE: Brand: 3M™ STERI-STRIP™

## (undated) DEVICE — SYRINGE 50ML LL

## (undated) DEVICE — SCD SEQUENTIAL COMPRESSION COMFORT SLEEVE MEDIUM KNEE LENGTH: Brand: KENDALL SCD

## (undated) DEVICE — 1820 FOAM BLOCK NEEDLE COUNTER: Brand: DEVON

## (undated) DEVICE — GLOVE INDICATOR PI UNDERGLOVE SZ 8 BLUE

## (undated) DEVICE — SYRINGE 30ML LL

## (undated) DEVICE — NEEDLE 18 G X 1 1/2

## (undated) DEVICE — GLOVE PI ULTRA TOUCH SZ.7.0

## (undated) DEVICE — SAW BLADE MICRO SAGGITAL 4.5MM X 25.5 X .024

## (undated) DEVICE — 3M™ TEGADERM™ CHG DRESSING 25/CARTON 4 CARTONS/CASE 1659: Brand: TEGADERM™

## (undated) DEVICE — GUIDEWIRE ORTHO STAINLESS STEEL 1.4MM THREAD GOLD

## (undated) DEVICE — 3M™ STERI-STRIP™ COMPOUND BENZOIN TINCTURE 40 BAGS/CARTON 4 CARTONS/CASE C1544: Brand: 3M™ STERI-STRIP™

## (undated) DEVICE — SUT VICRYL 0 CT-1 27 IN J260H

## (undated) DEVICE — OCCLUSIVE GAUZE STRIP,3% BISMUTH TRIBROMOPHENATE IN PETROLATUM BLEND: Brand: XEROFORM

## (undated) DEVICE — STOCKINETTE REGULAR

## (undated) DEVICE — TRAY FOLEY 16FR URIMETER SURESTEP

## (undated) DEVICE — COTTON TIP APPLICTOR 2 PK

## (undated) DEVICE — INSTRUMENT POUCH: Brand: CONVERTORS

## (undated) DEVICE — 3M™ STERI-STRIP™ REINFORCED ADHESIVE SKIN CLOSURES, R1542, 1/4 IN X 1-1/2 IN (6 MM X 38 MM), 6 STRIPS/ENVELOPE: Brand: 3M™ STERI-STRIP™

## (undated) DEVICE — SUT ETHIBOND 0 CT-2 30 IN X412H

## (undated) DEVICE — ACE WRAP 6 IN UNSTERILE

## (undated) DEVICE — JACKSON-PRATT 100CC BULB RESERVOIR: Brand: CARDINAL HEALTH

## (undated) DEVICE — SPONGE LAP STERILE 18X18

## (undated) DEVICE — DRESSING XEROFORM 5 X 9

## (undated) DEVICE — SUT VICRYL 3-0 SH 27 IN J416H